# Patient Record
Sex: MALE | Race: WHITE
[De-identification: names, ages, dates, MRNs, and addresses within clinical notes are randomized per-mention and may not be internally consistent; named-entity substitution may affect disease eponyms.]

---

## 2017-08-27 ENCOUNTER — HOSPITAL ENCOUNTER (EMERGENCY)
Dept: HOSPITAL 17 - NEPE | Age: 37
Discharge: HOME | End: 2017-08-27
Payer: COMMERCIAL

## 2017-08-27 VITALS — WEIGHT: 174.17 LBS | BODY MASS INDEX: 21.21 KG/M2 | HEIGHT: 76 IN

## 2017-08-27 VITALS
RESPIRATION RATE: 16 BRPM | HEART RATE: 67 BPM | SYSTOLIC BLOOD PRESSURE: 120 MMHG | TEMPERATURE: 98.1 F | DIASTOLIC BLOOD PRESSURE: 78 MMHG | OXYGEN SATURATION: 100 %

## 2017-08-27 VITALS — RESPIRATION RATE: 17 BRPM

## 2017-08-27 VITALS
OXYGEN SATURATION: 98 % | SYSTOLIC BLOOD PRESSURE: 126 MMHG | TEMPERATURE: 98.4 F | DIASTOLIC BLOOD PRESSURE: 83 MMHG | RESPIRATION RATE: 16 BRPM | HEART RATE: 74 BPM

## 2017-08-27 VITALS — DIASTOLIC BLOOD PRESSURE: 78 MMHG | TEMPERATURE: 97.8 F | SYSTOLIC BLOOD PRESSURE: 118 MMHG

## 2017-08-27 DIAGNOSIS — J20.9: Primary | ICD-10-CM

## 2017-08-27 DIAGNOSIS — F17.290: ICD-10-CM

## 2017-08-27 DIAGNOSIS — R07.89: ICD-10-CM

## 2017-08-27 LAB
ALP SERPL-CCNC: 170 U/L (ref 45–117)
ALT SERPL-CCNC: 123 U/L (ref 12–78)
ANION GAP SERPL CALC-SCNC: 7 MEQ/L (ref 5–15)
AST SERPL-CCNC: 129 U/L (ref 15–37)
BASOPHILS # BLD AUTO: 0 TH/MM3 (ref 0–0.2)
BASOPHILS NFR BLD: 0.9 % (ref 0–2)
BILIRUB SERPL-MCNC: 0.5 MG/DL (ref 0.2–1)
BUN SERPL-MCNC: 13 MG/DL (ref 7–18)
CHLORIDE SERPL-SCNC: 103 MEQ/L (ref 98–107)
EOSINOPHIL # BLD: 0.1 TH/MM3 (ref 0–0.4)
EOSINOPHIL NFR BLD: 3.9 % (ref 0–4)
ERYTHROCYTE [DISTWIDTH] IN BLOOD BY AUTOMATED COUNT: 14.3 % (ref 11.6–17.2)
GFR SERPLBLD BASED ON 1.73 SQ M-ARVRAT: 92 ML/MIN (ref 89–?)
HCO3 BLD-SCNC: 26.8 MEQ/L (ref 21–32)
HCT VFR BLD CALC: 36.6 % (ref 39–51)
HEMO FLAGS: (no result)
LYMPHOCYTES # BLD AUTO: 1.4 TH/MM3 (ref 1–4.8)
LYMPHOCYTES NFR BLD AUTO: 44.7 % (ref 9–44)
MCH RBC QN AUTO: 30.4 PG (ref 27–34)
MCHC RBC AUTO-ENTMCNC: 32.7 % (ref 32–36)
MCV RBC AUTO: 93 FL (ref 80–100)
MONOCYTES NFR BLD: 7.4 % (ref 0–8)
NEUTROPHILS # BLD AUTO: 1.3 TH/MM3 (ref 1.8–7.7)
NEUTROPHILS NFR BLD AUTO: 43.1 % (ref 16–70)
PLATELET # BLD: 100 TH/MM3 (ref 150–450)
POTASSIUM SERPL-SCNC: 4.1 MEQ/L (ref 3.5–5.1)
RBC # BLD AUTO: 3.94 MIL/MM3 (ref 4.5–5.9)
SODIUM SERPL-SCNC: 137 MEQ/L (ref 136–145)
WBC # BLD AUTO: 3.1 TH/MM3 (ref 4–11)

## 2017-08-27 PROCEDURE — 96361 HYDRATE IV INFUSION ADD-ON: CPT

## 2017-08-27 PROCEDURE — 85025 COMPLETE CBC W/AUTO DIFF WBC: CPT

## 2017-08-27 PROCEDURE — 96374 THER/PROPH/DIAG INJ IV PUSH: CPT

## 2017-08-27 PROCEDURE — 84484 ASSAY OF TROPONIN QUANT: CPT

## 2017-08-27 PROCEDURE — 87040 BLOOD CULTURE FOR BACTERIA: CPT

## 2017-08-27 PROCEDURE — 93005 ELECTROCARDIOGRAM TRACING: CPT

## 2017-08-27 PROCEDURE — 83605 ASSAY OF LACTIC ACID: CPT

## 2017-08-27 PROCEDURE — 86403 PARTICLE AGGLUT ANTBDY SCRN: CPT

## 2017-08-27 PROCEDURE — 71010: CPT

## 2017-08-27 PROCEDURE — 87205 SMEAR GRAM STAIN: CPT

## 2017-08-27 PROCEDURE — 99285 EMERGENCY DEPT VISIT HI MDM: CPT

## 2017-08-27 PROCEDURE — 80053 COMPREHEN METABOLIC PANEL: CPT

## 2017-08-27 NOTE — PD
HPI


Chief Complaint:  Cold / Flu Symptoms


Time Seen by Provider:  06:54


Travel History


International Travel<30 days:  No


Contact w/Intl Traveler<30days:  No


Traveled to known affect area:  No





History of Present Illness


HPI


The patient is a 36-year-old  male who presents to the emergency 

department for 3 days of right sided chest pain and cough.  The patient states 

he is dull, constant, right sided chest pain that is worse with inspiration, 

coughing, and movement of the right upper extremity.  He also complains of 

initially nonproductive cough, now producing brown sputum.  He denies any 

shortness of breath, left-sided chest pain, fever, chills, or sweats.  He does 

have a history of tobacco use.  He denies any history of CAD, hypertension, 

hyperlipidemia, or diabetes.  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 

previous osteomyelitis and endocarditis, last used drugs 8 months ago, prior to 

treatment for endocarditis.  He denies any current IVDA.  He denies any nausea, 

vomiting, diarrhea, or abdominal pain.  He denies any right sided chest trauma.





PFSH


Past Medical History


Cancer:  No


Cardiovascular Problems:  No


Diminished Hearing:  No


Endocrine:  No


Gastrointestinal Disorders:  No (ELENA- VENTED/SEDATED)


Genitourinary:  No


Headaches:  Yes


Hepatitis:  Yes (hep c)


Immune Disorder:  No


Implanted Vascular Access Dvce:  No


Musculoskeletal:  Yes


Neurologic:  Yes


Psychiatric:  No


Reproductive:  No


Respiratory:  No


Integumentary:  Yes


Immunizations Current:  Yes


Migraines:  Yes


Tetanus Vaccination:  < 5 Years


Influenza Vaccination:  Yes





Past Surgical History


Neurologic Surgery:  Yes


Other Surgery:  Yes





Social History


Alcohol Use:  Yes (1/2 PINT A DAY PREVIOUSLY)


Tobacco Use:  Yes (1   PACK A DAY )


Substance Use:  Yes (history of polysubstance use, IV Dilaudid often,and abuse 

since high school, quit 8 months ago prior to treatment for endocarditis)





Allergies-Medications


(Allergen,Severity, Reaction):  


Coded Allergies:  


     *MDRO Multi-Drug Resistant Organism (Verified  Adverse Reaction, Unknown, 8 /27/17)


 MRSA (sputum & blood) - 6/25/13


 MRSA (wound) - 9/29/08


 **MRSA PCR Screen POSITIVE - 6/28/2016**


Reported Meds & Prescriptions





Reported Meds & Active Scripts


Active


Reported


Methadone (Methadone HCl) 40 Mg Tab 60 Mg PO DAILY








Review of Systems


Except as stated in HPI:  all other systems reviewed are Neg


General / Constitutional:  No: Fever


HENT:  No: Lightheadedness


Cardiovascular:  Positive: Chest Pain or Discomfort


Respiratory:  Positive: Cough, No: Shortness of Breath


Gastrointestinal:  No: Nausea, Vomiting, Abdominal Pain


Musculoskeletal:  No: Myalgias


Skin:  No Rash





Physical Exam


Narrative


GENERAL: Awake, alert, nontoxic-appearing 36-year-old male who appears his 

stated age and is in no acute respiratory distress.


SKIN: Focused skin assessment warm/dry.


HEAD: Atraumatic. Normocephalic. 


EYES: Pupils equal and round. No scleral icterus. No injection or drainage. 


ENT: No nasal bleeding or discharge.  Mucous membranes pink and moist.


NECK: Trachea midline. No JVD. 


CARDIOVASCULAR: Regular rate and rhythm.  Systolic murmur noted.  Palpation of 

right chest wall reproduces symptoms.


RESPIRATORY: No accessory muscle use. Clear to auscultation. Breath sounds 

equal bilaterally. 


GASTROINTESTINAL: Abdomen soft, non-tender, nondistended.  No rebound 

tenderness.


MUSCULOSKELETAL: No obvious deformities. No clubbing.  No cyanosis.  No edema.  

Abduction and rotation of the right upper extremity reproduces chest pain.


NEUROLOGICAL: Awake and alert. No obvious cranial nerve deficits.  Motor 

grossly within normal limits. Normal speech.


PSYCHIATRIC: Appropriate mood and affect; insight and judgment normal.





Data


Data


Last Documented VS





Vital Signs








  Date Time  Temp Pulse Resp B/P (MAP) Pulse Ox O2 Delivery O2 Flow Rate FiO2


 


8/27/17 07:25 98.1 67 16 120/78 (92) 100 Room Air  








Orders





 Orders


Chest, Single Ap (8/27/17 )


Complete Blood Count With Diff (8/27/17 07:18)


Comprehensive Metabolic Panel (8/27/17 07:18)


Troponin I (8/27/17 07:18)


Lactic Acid (8/27/17 07:18)


Blood Culture (8/27/17 07:18)


Ketorolac Inj (Toradol Inj) (8/27/17 07:30)


Sodium Chlorid 0.9% 500 Ml Inj (Ns 500 M (8/27/17 07:30)


Electrocardiogram (8/27/17 06:42)





Labs





Laboratory Tests








Test


  8/27/17


07:18


 


White Blood Count 3.1 TH/MM3 


 


Red Blood Count 3.94 MIL/MM3 


 


Hemoglobin 12.0 GM/DL 


 


Hematocrit 36.6 % 


 


Mean Corpuscular Volume 93.0 FL 


 


Mean Corpuscular Hemoglobin 30.4 PG 


 


Mean Corpuscular Hemoglobin


Concent 32.7 % 


 


 


Red Cell Distribution Width 14.3 % 


 


Platelet Count 100 TH/MM3 


 


Mean Platelet Volume 9.6 FL 


 


Neutrophils (%) (Auto) 43.1 % 


 


Lymphocytes (%) (Auto) 44.7 % 


 


Monocytes (%) (Auto) 7.4 % 


 


Eosinophils (%) (Auto) 3.9 % 


 


Basophils (%) (Auto) 0.9 % 


 


Neutrophils # (Auto) 1.3 TH/MM3 


 


Lymphocytes # (Auto) 1.4 TH/MM3 


 


Monocytes # (Auto) 0.2 TH/MM3 


 


Eosinophils # (Auto) 0.1 TH/MM3 


 


Basophils # (Auto) 0.0 TH/MM3 


 


CBC Comment DIFF FINAL 


 


Differential Comment  


 


Blood Urea Nitrogen 13 MG/DL 


 


Creatinine 0.93 MG/DL 


 


Random Glucose 123 MG/DL 


 


Total Protein 6.6 GM/DL 


 


Albumin 2.8 GM/DL 


 


Calcium Level 8.2 MG/DL 


 


Alkaline Phosphatase 170 U/L 


 


Aspartate Amino Transf


(AST/SGOT) 129 U/L 


 


 


Alanine Aminotransferase


(ALT/SGPT) 123 U/L 


 


 


Total Bilirubin 0.5 MG/DL 


 


Sodium Level 137 MEQ/L 


 


Potassium Level 4.1 MEQ/L 


 


Chloride Level 103 MEQ/L 


 


Carbon Dioxide Level 26.8 MEQ/L 


 


Anion Gap 7 MEQ/L 


 


Estimat Glomerular Filtration


Rate 92 ML/MIN 


 


 


Lactic Acid Level 1.4 mmol/L 


 


Troponin I


  LESS THAN 0.02


NG/ML











MDM


Medical Decision Making


Medical Screen Exam Complete:  Yes


Emergency Medical Condition:  Yes


Medical Record Reviewed:  Yes


Interpretation(s)


EKG reveals normal sinus rhythm with a rate of 66.  Inverted T-wave in lead 3.








Last Impressions








Chest X-Ray 8/27/17 0000 Signed





Impressions: 





 Service Date/Time:  Sunday, August 27, 2017 07:16 - CONCLUSION: Normal 





 examination.       Roopa Ng MD 








Laboratory Tests








Test


  8/27/17


07:18


 


White Blood Count 3.1 TH/MM3 


 


Red Blood Count 3.94 MIL/MM3 


 


Hemoglobin 12.0 GM/DL 


 


Hematocrit 36.6 % 


 


Mean Corpuscular Volume 93.0 FL 


 


Mean Corpuscular Hemoglobin 30.4 PG 


 


Mean Corpuscular Hemoglobin


Concent 32.7 % 


 


 


Red Cell Distribution Width 14.3 % 


 


Platelet Count 100 TH/MM3 


 


Mean Platelet Volume 9.6 FL 


 


Neutrophils (%) (Auto) 43.1 % 


 


Lymphocytes (%) (Auto) 44.7 % 


 


Monocytes (%) (Auto) 7.4 % 


 


Eosinophils (%) (Auto) 3.9 % 


 


Basophils (%) (Auto) 0.9 % 


 


Neutrophils # (Auto) 1.3 TH/MM3 


 


Lymphocytes # (Auto) 1.4 TH/MM3 


 


Monocytes # (Auto) 0.2 TH/MM3 


 


Eosinophils # (Auto) 0.1 TH/MM3 


 


Basophils # (Auto) 0.0 TH/MM3 


 


CBC Comment DIFF FINAL 


 


Differential Comment  


 


Blood Urea Nitrogen 13 MG/DL 


 


Creatinine 0.93 MG/DL 


 


Random Glucose 123 MG/DL 


 


Total Protein 6.6 GM/DL 


 


Albumin 2.8 GM/DL 


 


Calcium Level 8.2 MG/DL 


 


Alkaline Phosphatase 170 U/L 


 


Aspartate Amino Transf


(AST/SGOT) 129 U/L 


 


 


Alanine Aminotransferase


(ALT/SGPT) 123 U/L 


 


 


Total Bilirubin 0.5 MG/DL 


 


Sodium Level 137 MEQ/L 


 


Potassium Level 4.1 MEQ/L 


 


Chloride Level 103 MEQ/L 


 


Carbon Dioxide Level 26.8 MEQ/L 


 


Anion Gap 7 MEQ/L 


 


Estimat Glomerular Filtration


Rate 92 ML/MIN 


 


 


Lactic Acid Level 1.4 mmol/L 


 


Troponin I


  LESS THAN 0.02


NG/ML








Differential Diagnosis


Differential diagnosis includes bronchitis, pneumonia, pleurisy, acute coronary 

syndrome, endocarditis, pleural effusion, empyema.


Narrative Course


IV was established, labs are drawn and sent, and the patient was placed on 

cardiac telemetry monitoring and continuous pulse oximetry monitoring.  EKG was 

ordered and interpreted.  Chest x-ray was obtained.  Chest x-ray was negative.  

Blood cultures were sent to lab, as patient does have a history of endocarditis 

and previous IVDA, however, patient is currently afebrile.  The patient's white 

count is slightly low at 3.1.  LFTs are elevated in the 120s, patient does have 

a history of significantly elevated LFTs and is tested positive for hepatitis B 

and hepatitis C in the past.  The patient is currently afebrile, we will follow 

blood cultures, there are positive he left his cell phone number with 

registration so we have a contact number in case she is positive.  The patient 

will be treated for bronchitis and pleurisy.  He is advised to return if 

symptoms worsen or progress.





Diagnosis





 Primary Impression:  


 Bronchitis


 Additional Impression:  


 Atypical chest pain


Patient Instructions:  General Instructions





***Additional Instructions:  


Medications as directed.  Follow-up with your primary physician.  Return if 

symptoms worsen or progress.


***Med/Other Pt SpecificInfo:  Prescription(s) given


Scripts


Albuterol 18 GM Inh (Ventolin Hfa 18 GM Inh) 90 Mcg/Act Aer


2 PUFF INH Q4H Y for SHORTNESS OF BREATH, #1 INHALER 0 Refills


   Prov: Harvey Cole MD         8/27/17 


Azithromycin (Zithromax Z-Tian) 250 Mg Dspk


250 MG PO AS DIRECTED for Infection, #1 DSPK 0 Refills


   500 MG (2 tabs) day 1, then 1 tab days 2-5.


   Prov: Harvey Cole MD         8/27/17


Disposition:  01 DISCHARGE HOME


Condition:  Stable











Harvey Cole MD Aug 27, 2017 07:04

## 2017-08-27 NOTE — EKG
Date Performed: 08/27/2017       Time Performed: 06:42:13

 

PTAGE:      36 years

 

EKG:      Sinus rhythm 

 

 NORMAL ECG

 

PREVIOUS TRACING       : 11/16/2016 12.28 Compared to prior tracing no significant change

 

DOCTOR:   Tanner Veloz  Interpretating Date/Time  08/27/2017 11:46:39

## 2017-08-27 NOTE — RADRPT
EXAM DATE/TIME:  08/27/2017 07:16 

 

[facility] COMPARISON:     

CHEST SINGLE AP, October 02, 2016, 13:25.

 

                     

INDICATIONS :     

Right sided chest pain and cough.

                     

 

MEDICAL HISTORY :     

None.          

 

SURGICAL HISTORY :     

None.   

 

ENCOUNTER:     

Initial                                        

 

ACUITY:     

1 day      

 

PAIN SCORE:     

5/10

 

LOCATION:     

Right chest 

 

FINDINGS:     

A single view of the chest demonstrates the lungs to be symmetrically aerated without evidence of mas
s, infiltrate or effusion.  The cardiomediastinal contours are unremarkable.  Osseous structures are 
intact.

 

CONCLUSION:     Normal examination.  

 

 

 

 Roopa Ng MD on August 27, 2017 at 7:16           

Board Certified Radiologist.

 This report was verified electronically.

## 2017-09-03 ENCOUNTER — HOSPITAL ENCOUNTER (INPATIENT)
Dept: HOSPITAL 17 - NEPE | Age: 37
LOS: 4 days | Discharge: HOME | DRG: 289 | End: 2017-09-07
Attending: FAMILY MEDICINE | Admitting: FAMILY MEDICINE
Payer: MEDICAID

## 2017-09-03 VITALS
DIASTOLIC BLOOD PRESSURE: 79 MMHG | HEART RATE: 50 BPM | RESPIRATION RATE: 17 BRPM | OXYGEN SATURATION: 100 % | TEMPERATURE: 97.5 F | SYSTOLIC BLOOD PRESSURE: 114 MMHG

## 2017-09-03 VITALS — WEIGHT: 176.81 LBS | BODY MASS INDEX: 21.98 KG/M2 | HEIGHT: 75 IN

## 2017-09-03 VITALS
HEART RATE: 58 BPM | TEMPERATURE: 98.2 F | DIASTOLIC BLOOD PRESSURE: 72 MMHG | RESPIRATION RATE: 16 BRPM | OXYGEN SATURATION: 98 % | SYSTOLIC BLOOD PRESSURE: 120 MMHG

## 2017-09-03 VITALS
RESPIRATION RATE: 16 BRPM | DIASTOLIC BLOOD PRESSURE: 86 MMHG | OXYGEN SATURATION: 98 % | SYSTOLIC BLOOD PRESSURE: 130 MMHG | HEART RATE: 83 BPM | TEMPERATURE: 98.4 F

## 2017-09-03 VITALS
OXYGEN SATURATION: 98 % | HEART RATE: 60 BPM | SYSTOLIC BLOOD PRESSURE: 125 MMHG | RESPIRATION RATE: 18 BRPM | DIASTOLIC BLOOD PRESSURE: 79 MMHG

## 2017-09-03 VITALS
SYSTOLIC BLOOD PRESSURE: 116 MMHG | HEART RATE: 56 BPM | TEMPERATURE: 98.5 F | RESPIRATION RATE: 18 BRPM | DIASTOLIC BLOOD PRESSURE: 88 MMHG

## 2017-09-03 DIAGNOSIS — M46.20: ICD-10-CM

## 2017-09-03 DIAGNOSIS — F11.20: ICD-10-CM

## 2017-09-03 DIAGNOSIS — F15.10: ICD-10-CM

## 2017-09-03 DIAGNOSIS — D61.818: ICD-10-CM

## 2017-09-03 DIAGNOSIS — F14.10: ICD-10-CM

## 2017-09-03 DIAGNOSIS — F17.210: ICD-10-CM

## 2017-09-03 DIAGNOSIS — B16.9: ICD-10-CM

## 2017-09-03 DIAGNOSIS — I33.9: Primary | ICD-10-CM

## 2017-09-03 LAB
ALP SERPL-CCNC: 181 U/L (ref 45–117)
ALT SERPL-CCNC: 162 U/L (ref 12–78)
ANION GAP SERPL CALC-SCNC: 9 MEQ/L (ref 5–15)
APTT BLD: 29.2 SEC (ref 24.3–30.1)
AST SERPL-CCNC: 173 U/L (ref 15–37)
BASOPHILS # BLD AUTO: 0 TH/MM3 (ref 0–0.2)
BASOPHILS NFR BLD: 1 % (ref 0–2)
BILIRUB SERPL-MCNC: 0.7 MG/DL (ref 0.2–1)
BUN SERPL-MCNC: 12 MG/DL (ref 7–18)
CHLORIDE SERPL-SCNC: 103 MEQ/L (ref 98–107)
CK MB SERPL-MCNC: 4.2 NG/ML (ref 0.5–3.6)
CK SERPL-CCNC: 247 U/L (ref 39–308)
COLOR UR: YELLOW
COMMENT (UR): (no result)
CULTURE IF INDICATED: (no result)
EOSINOPHIL # BLD: 0.1 TH/MM3 (ref 0–0.4)
EOSINOPHIL NFR BLD: 2.8 % (ref 0–4)
ERYTHROCYTE [DISTWIDTH] IN BLOOD BY AUTOMATED COUNT: 13.8 % (ref 11.6–17.2)
GFR SERPLBLD BASED ON 1.73 SQ M-ARVRAT: 88 ML/MIN (ref 89–?)
GLUCOSE UR STRIP-MCNC: (no result) MG/DL
HCO3 BLD-SCNC: 25.7 MEQ/L (ref 21–32)
HCT VFR BLD CALC: 36.9 % (ref 39–51)
HEMO FLAGS: (no result)
HGB UR QL STRIP: (no result)
INR PPP: 1 RATIO
KETONES UR STRIP-MCNC: (no result) MG/DL
LYMPHOCYTES # BLD AUTO: 1.4 TH/MM3 (ref 1–4.8)
LYMPHOCYTES NFR BLD AUTO: 38.7 % (ref 9–44)
MAGNESIUM SERPL-MCNC: 2 MG/DL (ref 1.5–2.5)
MCH RBC QN AUTO: 30.2 PG (ref 27–34)
MCHC RBC AUTO-ENTMCNC: 32.8 % (ref 32–36)
MCV RBC AUTO: 92 FL (ref 80–100)
MONOCYTES NFR BLD: 8.1 % (ref 0–8)
MUCOUS THREADS #/AREA URNS LPF: (no result) /LPF
NEUTROPHILS # BLD AUTO: 1.7 TH/MM3 (ref 1.8–7.7)
NEUTROPHILS NFR BLD AUTO: 49.4 % (ref 16–70)
NITRITE UR QL STRIP: (no result)
PLAT MORPH BLD: NORMAL
PLATELET # BLD: 97 TH/MM3 (ref 150–450)
PLATELET BLD QL SMEAR: (no result)
POTASSIUM SERPL-SCNC: 4 MEQ/L (ref 3.5–5.1)
PROTHROMBIN TIME: 11.5 SEC (ref 9.8–11.6)
RBC # BLD AUTO: 4.01 MIL/MM3 (ref 4.5–5.9)
RBC MORPH BLD: NORMAL
SCAN/DIFF: (no result)
SODIUM SERPL-SCNC: 138 MEQ/L (ref 136–145)
SP GR UR STRIP: 1.03 (ref 1–1.03)
WBC # BLD AUTO: 3.5 TH/MM3 (ref 4–11)

## 2017-09-03 PROCEDURE — 82550 ASSAY OF CK (CPK): CPT

## 2017-09-03 PROCEDURE — 85730 THROMBOPLASTIN TIME PARTIAL: CPT

## 2017-09-03 PROCEDURE — 36569 INSJ PICC 5 YR+ W/O IMAGING: CPT

## 2017-09-03 PROCEDURE — 84100 ASSAY OF PHOSPHORUS: CPT

## 2017-09-03 PROCEDURE — 85025 COMPLETE CBC W/AUTO DIFF WBC: CPT

## 2017-09-03 PROCEDURE — 87205 SMEAR GRAM STAIN: CPT

## 2017-09-03 PROCEDURE — 87186 SC STD MICRODIL/AGAR DIL: CPT

## 2017-09-03 PROCEDURE — 80202 ASSAY OF VANCOMYCIN: CPT

## 2017-09-03 PROCEDURE — 83605 ASSAY OF LACTIC ACID: CPT

## 2017-09-03 PROCEDURE — 93325 DOPPLER ECHO COLOR FLOW MAPG: CPT

## 2017-09-03 PROCEDURE — 96365 THER/PROPH/DIAG IV INF INIT: CPT

## 2017-09-03 PROCEDURE — 80074 ACUTE HEPATITIS PANEL: CPT

## 2017-09-03 PROCEDURE — 83735 ASSAY OF MAGNESIUM: CPT

## 2017-09-03 PROCEDURE — 76937 US GUIDE VASCULAR ACCESS: CPT

## 2017-09-03 PROCEDURE — 87522 HEPATITIS C REVRS TRNSCRPJ: CPT

## 2017-09-03 PROCEDURE — 85007 BL SMEAR W/DIFF WBC COUNT: CPT

## 2017-09-03 PROCEDURE — 84484 ASSAY OF TROPONIN QUANT: CPT

## 2017-09-03 PROCEDURE — 71010: CPT

## 2017-09-03 PROCEDURE — 93306 TTE W/DOPPLER COMPLETE: CPT

## 2017-09-03 PROCEDURE — 80053 COMPREHEN METABOLIC PANEL: CPT

## 2017-09-03 PROCEDURE — 86403 PARTICLE AGGLUT ANTBDY SCRN: CPT

## 2017-09-03 PROCEDURE — 93005 ELECTROCARDIOGRAM TRACING: CPT

## 2017-09-03 PROCEDURE — 87517 HEPATITIS B DNA QUANT: CPT

## 2017-09-03 PROCEDURE — 85027 COMPLETE CBC AUTOMATED: CPT

## 2017-09-03 PROCEDURE — 82552 ASSAY OF CPK IN BLOOD: CPT

## 2017-09-03 PROCEDURE — 93312 ECHO TRANSESOPHAGEAL: CPT

## 2017-09-03 PROCEDURE — 80307 DRUG TEST PRSMV CHEM ANLYZR: CPT

## 2017-09-03 PROCEDURE — 93320 DOPPLER ECHO COMPLETE: CPT

## 2017-09-03 PROCEDURE — 87077 CULTURE AEROBIC IDENTIFY: CPT

## 2017-09-03 PROCEDURE — 87040 BLOOD CULTURE FOR BACTERIA: CPT

## 2017-09-03 PROCEDURE — 85610 PROTHROMBIN TIME: CPT

## 2017-09-03 PROCEDURE — 81001 URINALYSIS AUTO W/SCOPE: CPT

## 2017-09-03 PROCEDURE — 76705 ECHO EXAM OF ABDOMEN: CPT

## 2017-09-03 RX ADMIN — ENOXAPARIN SODIUM SCH MG: 40 INJECTION SUBCUTANEOUS at 10:33

## 2017-09-03 RX ADMIN — NICOTINE SCH PATCH: 7 PATCH, EXTENDED RELEASE TOPICAL at 10:32

## 2017-09-03 RX ADMIN — STANDARDIZED SENNA CONCENTRATE AND DOCUSATE SODIUM SCH TAB: 8.6; 5 TABLET, FILM COATED ORAL at 09:49

## 2017-09-03 RX ADMIN — VANCOMYCIN HYDROCHLORIDE SCH MLS/HR: 1 INJECTION, SOLUTION INTRAVENOUS at 17:18

## 2017-09-03 RX ADMIN — Medication SCH ML: at 21:00

## 2017-09-03 RX ADMIN — PHENYTOIN SODIUM SCH MLS/HR: 50 INJECTION INTRAMUSCULAR; INTRAVENOUS at 17:18

## 2017-09-03 RX ADMIN — PHENYTOIN SODIUM SCH MLS/HR: 50 INJECTION INTRAMUSCULAR; INTRAVENOUS at 09:48

## 2017-09-03 RX ADMIN — VANCOMYCIN HYDROCHLORIDE SCH MLS/HR: 1 INJECTION, SOLUTION INTRAVENOUS at 23:38

## 2017-09-03 RX ADMIN — STANDARDIZED SENNA CONCENTRATE AND DOCUSATE SODIUM SCH TAB: 8.6; 5 TABLET, FILM COATED ORAL at 21:00

## 2017-09-03 RX ADMIN — METHADONE HYDROCHLORIDE SCH MG: 10 TABLET ORAL at 09:49

## 2017-09-03 RX ADMIN — Medication SCH ML: at 09:48

## 2017-09-03 NOTE — RADRPT
EXAM DATE/TIME:  09/03/2017 07:32 

 

[facility] COMPARISON:     

CHEST SINGLE AP, August 27, 2017, 7:16.

 

                     

INDICATIONS :     

Right sided chest pain.

                     

 

MEDICAL HISTORY :     

None.          

 

SURGICAL HISTORY :     

None.   

 

ENCOUNTER:     

[facility]                                        

 

ACUITY:     

2 weeks      

 

PAIN SCORE:     

5/10

 

LOCATION:     

Right chest 

 

FINDINGS:     

A single view of the chest demonstrates the lungs to be symmetrically aerated without evidence of mas
s, infiltrate or effusion.  The cardiomediastinal contours are unremarkable.  Osseous structures are 
intact.

 

CONCLUSION:     No acute disease.  

 

 

 

 Jesús Singer MD on September 03, 2017 at 7:38           

Board Certified Radiologist.

 This report was verified electronically.

## 2017-09-03 NOTE — PD
HPI


Chief Complaint:  Abnormal Results


Time Seen by Provider:  07:02


Travel History


International Travel<30 days:  No


Contact w/Intl Traveler<30days:  No


Traveled to known affect area:  No





History of Present Illness


HPI


c/o gen weakness and malaise for last 2 days, was seen here on aug 28, felt 

better but now feels more general weakness and occasional cp with nonproductive 

cough..per patient he has not abused dilaudid ivda as before, he follows with a 

methaddone clinic instead..   denies active fever, /n/v/d/ha/abd pain





PFSH


Past Medical History


Cancer:  No


Cardiovascular Problems:  No


Diminished Hearing:  No


Endocrine:  No


Gastrointestinal Disorders:  No (ELENA- VENTED/SEDATED)


Genitourinary:  No


Headaches:  Yes


Hepatitis:  Yes (hep c)


Immune Disorder:  No


Implanted Vascular Access Dvce:  No


Musculoskeletal:  Yes


Neurologic:  Yes


Psychiatric:  No


Reproductive:  No


Respiratory:  No


Integumentary:  Yes


Immunizations Current:  Yes


Migraines:  Yes


Tetanus Vaccination:  < 5 Years


Influenza Vaccination:  Yes





Past Surgical History


Neurologic Surgery:  Yes ("half skull was crushed")


Other Surgery:  Yes





Social History


Alcohol Use:  Yes (1/2 PINT A DAY PREVIOUSLY)


Tobacco Use:  Yes (1   PACK A DAY )


Substance Use:  Yes (methadone RX)





Allergies-Medications


(Allergen,Severity, Reaction):  


Coded Allergies:  


     *MDRO Multi-Drug Resistant Organism (Verified  Adverse Reaction, Unknown, 8 /27/17)


 MRSA (sputum & blood) - 6/25/13


 MRSA (wound) - 9/29/08


 **MRSA PCR Screen POSITIVE - 6/28/2016**


Reported Meds & Prescriptions





Reported Meds & Active Scripts


Active


Ventolin Hfa 18 GM Inh (Albuterol Sulfate) 90 Mcg/Act Aer 2 Puff INH Q4H PRN


Reported


Methadone (Methadone HCl) 40 Mg Tab 60 Mg PO DAILY








Review of Systems


Except as stated in HPI:  all other systems reviewed are Neg


General / Constitutional:  Positive: Other (malaise)


Cardiovascular:  Positive: Chest Pain or Discomfort


Respiratory:  Positive: Cough





Physical Exam


Narrative


GENERAL: 


SKIN: Warm and dry.


HEAD: Atraumatic. Normocephalic. 


EYES: Pupils equal and round. No scleral icterus. No injection or drainage. 


ENT: No nasal bleeding or discharge.  Mucous membranes pink and moist.


NECK: Trachea midline. No JVD. 


CARDIOVASCULAR: Regular rate and rhythm.  holosystolic murmur rad too left 

axilla c/w mitral valve dz


RESPIRATORY: No accessory muscle use. Clear to auscultation. Breath sounds 

equal bilaterally. 


GASTROINTESTINAL: Abdomen soft, non-tender, nondistended.


MUSCULOSKELETAL: Extremities without clubbing, cyanosis, or edema. No obvious 

deformities. 


NEUROLOGICAL: Awake and alert. No obvious cranial nerve deficits.  Motor 

grossly within normal limits. Five out of 5 muscle strength in the arms and 

legs.  Normal speech.


PSYCHIATRIC: Appropriate mood and affect; insight and judgment normal.





Data


Data


Last Documented VS





Vital Signs








  Date Time  Temp Pulse Resp B/P (MAP) Pulse Ox O2 Delivery O2 Flow Rate FiO2


 


9/3/17 06:26 98.4 83 16 130/86 (101) 98 Room Air  








Orders





 Orders


Electrocardiogram (9/3/17 07:15)


Ckmb (Isoenzyme) Profile (9/3/17 07:15)


Complete Blood Count With Diff (9/3/17 07:15)


Comprehensive Metabolic Panel (9/3/17 07:15)


Prothrombin Time / Inr (Pt) (9/3/17 07:15)


Act Partial Throm Time (Ptt) (9/3/17 07:15)


Troponin I (9/3/17 07:15)


Chest, Single Ap (9/3/17 07:15)


Ecg Monitoring (9/3/17 07:15)


Bilateral Bp Monitoring (9/3/17 07:15)


Iv Access Insert/Monitor (9/3/17 07:15)


Oximetry (9/3/17 07:15)


Oxygen Administration (9/3/17 07:15)


Sodium Chloride 0.9% Flush (Ns Flush) (9/3/17 07:15)


Vancomycin Inj (Vancomycin Inj) (9/3/17 07:30)


Admit Order (Ed Use Only) (9/3/17 08:22)


CKMB (9/3/17 07:50)


CKMB% (9/3/17 07:50)





Labs





Laboratory Tests








Test


  9/3/17


07:50


 


White Blood Count 3.5 TH/MM3 


 


Red Blood Count 4.01 MIL/MM3 


 


Hemoglobin 12.1 GM/DL 


 


Hematocrit 36.9 % 


 


Mean Corpuscular Volume 92.0 FL 


 


Mean Corpuscular Hemoglobin 30.2 PG 


 


Mean Corpuscular Hemoglobin


Concent 32.8 % 


 


 


Red Cell Distribution Width 13.8 % 


 


Platelet Count 97 TH/MM3 


 


Mean Platelet Volume 9.2 FL 


 


Neutrophils (%) (Auto) 49.4 % 


 


Lymphocytes (%) (Auto) 38.7 % 


 


Monocytes (%) (Auto) 8.1 % 


 


Eosinophils (%) (Auto) 2.8 % 


 


Basophils (%) (Auto) 1.0 % 


 


Neutrophils # (Auto) 1.7 TH/MM3 


 


Lymphocytes # (Auto) 1.4 TH/MM3 


 


Monocytes # (Auto) 0.3 TH/MM3 


 


Eosinophils # (Auto) 0.1 TH/MM3 


 


Basophils # (Auto) 0.0 TH/MM3 


 


CBC Comment AUTO DIFF 


 


Differential Comment


  AUTO DIFF


CONFIRMED


 


Platelet Estimate LOW 


 


Platelet Morphology Comment NORMAL 


 


Red Cell Morphology Comment NORMAL 


 


Prothrombin Time 11.5 SEC 


 


Prothromb Time International


Ratio 1.0 RATIO 


 


 


Activated Partial


Thromboplast Time 29.2 SEC 


 


 


Blood Urea Nitrogen 12 MG/DL 


 


Creatinine 0.97 MG/DL 


 


Random Glucose 64 MG/DL 


 


Total Protein 7.1 GM/DL 


 


Albumin 3.2 GM/DL 


 


Calcium Level 8.2 MG/DL 


 


Alkaline Phosphatase 181 U/L 


 


Aspartate Amino Transf


(AST/SGOT) 173 U/L 


 


 


Alanine Aminotransferase


(ALT/SGPT) 162 U/L 


 


 


Total Bilirubin 0.7 MG/DL 


 


Sodium Level 138 MEQ/L 


 


Potassium Level 4.0 MEQ/L 


 


Chloride Level 103 MEQ/L 


 


Carbon Dioxide Level 25.7 MEQ/L 


 


Anion Gap 9 MEQ/L 


 


Estimat Glomerular Filtration


Rate 88 ML/MIN 


 


 


Total Creatine Kinase 247 U/L 


 


Creatine Kinase MB 4.2 NG/ML 


 


Troponin I


  LESS THAN 0.02


NG/ML











MDM


Medical Decision Making


Medical Screen Exam Complete:  Yes


Emergency Medical Condition:  Yes


Medical Record Reviewed:  Yes


Differential Diagnosis


r/o endocarditis v vegetations v pna


Narrative Course


patient had positive blood cultures and due to his previous endocarditis 

history will give iv abx and keep for observation.





Diagnosis





 Primary Impression:  


 Bacteremia





Admitting Information


Admitting Physician Requests:  Observation











Romain Torre MD Sep 3, 2017 07:15

## 2017-09-03 NOTE — EKG
Date Performed: 09/03/2017       Time Performed: 07:48:17

 

PTAGE:      36 years

 

EKG:      SINUS BRADYCARDIA BORDERLINE ECG

 

PREVIOUS TRACING       : 08/27/2017 06.42

 

DOCTOR:   Héctor Spaulding  Interpretating Date/Time  09/03/2017 12:35:14

## 2017-09-03 NOTE — HHI.HP
HPI


Service


Family Medicine


Primary Care Physician


No Primary Care Physician


Admission Diagnosis





POSITIVE CULTURE R/O ENDOCARDITIS


Diagnoses:  


International Travel<30 Days:  No


Contact w/Intl Traveler<30days:  No


Known Affected Area:  No


History of Present Illness


Mr. Noah Curran is a 35-year-old male with a past medical history 

significant for IV drug use with consequential endocarditis diagnosed in 

September 2016, polysubstance abuse, hepatitis B, and vertebral osteomyelitis 

diagnosed in June 2016 that presents to the [facility] after being called to 

come in due to positive blood cultures. He was in the ED on Aug 27, 2016 with a 

chief complaint of right-sided chest pain and cough that was productive of 

brown sputum. He did not have any fever, chills or night sweats at that time. 

Blood cultures were drawn which grew coagulase-negative Staphylococcus in both 

anaerobic vials.





Today, he complains of fatigue and still has some mild chest pain. He denies 

cough, shortness of breath, fever, chills, or night sweats. He does have some 

difficulty with urination such that it is difficult to initiate.





Patient states that he has not used any IV drugs since his discharge from the 

hospital in November 2016. He has been going to the methadone clinic and is 

currently on 50 mg of methadone daily. He continues to smoke and drinks a twist 

of gin daily.


 (Clover Kelly MD R2)





Review of Systems


Constitutional:  COMPLAINS OF: Fatigue, Weight loss, Change in appetite, DENIES

: Fever, Chills, Night Sweats


Eyes:  DENIES: Blurred vision


Ears, nose, mouth, throat:  DENIES: Nasal discharge, Throat pain, Running Nose, 

Sinus Pain


Respiratory:  DENIES: Cough, Shortness of breath


Cardiovascular:  COMPLAINS OF: Chest pain, DENIES: Palpitations


Gastrointestinal:  DENIES: Abdominal pain, Bloody stools, Constipation, Diarrhea

, Nausea, Vomiting


Genitourinary:  COMPLAINS OF: Dysuria (More difficulty with urination)


Musculoskeletal:  DENIES: Joint pain, Muscle aches


Integumentary:  DENIES: Pruritus, Rash


Neurologic:  DENIES: Headache


Psychiatric:  DENIES: Anxiety, Depression (Clover Kelly MD R2)





Past Family Social History


Past Medical History


Mitral valve endocarditis in September 2016


Vertebral osteomyelitis in July 2016


Chronic back pain


Polysubstance IV drug abuse


Tobacco abuse


Past Surgical History


Right tibia fracture repair with plates


Reported Medications





Reported Meds & Active Scripts


Active


Ventolin Hfa 18 GM Inh (Albuterol Sulfate) 90 Mcg/Act Aer 2 Puff INH Q4H PRN


Reported


Methadone (Methadone HCl) 40 Mg Tab 60 Mg PO DAILY


 (BenjamínoClover MD R2)


Allergies:  


Coded Allergies:  


     *MDRO Multi-Drug Resistant Organism (Verified  Adverse Reaction, Unknown, 8 /27/17)


 MRSA (sputum & blood) - 6/25/13


 MRSA (wound) - 9/29/08


 **MRSA PCR Screen POSITIVE - 6/28/2016**


Family History


Father has diabetes and hypertension


Social History


Current daily smoker, recently cut down to half to three-quarter pack per day


Drinks a twist of gin daily


 but 


 (Clover Kelly MD R2)





Physical Exam


Vital Signs





Vital Signs








  Date Time  Temp Pulse Resp B/P (MAP) Pulse Ox O2 Delivery O2 Flow Rate FiO2


 


9/3/17 06:26 98.4 83 16 130/86 (101) 98 Room Air  








Physical Exam


GENERAL: This is a well-developed patient, in no apparent distress.


SKIN: No rashes, ecchymoses or lesions. Cool and dry.


HEAD: Atraumatic. Normocephalic. No temporal or scalp tenderness.


EYES: Pupils equal round and reactive. Extraocular motions intact. No scleral 

icterus. No injection or drainage. 


ENT: Nose without bleeding, purulent drainage or septal hematoma. Throat 

without erythema, tonsillar hypertrophy or exudate. Uvula midline. Airway 

patent.


NECK: Trachea midline. No JVD or lymphadenopathy. Small 0.5 mm nodule in the 

right lateral neck consistent with scar tissue. Supple, nontender, no meningeal 

signs.


CARDIOVASCULAR: Regular rate, regular rhythm, holosystolic murmur best heard in 

the mitral area


RESPIRATORY: Clear to auscultation. Breath sounds equal bilaterally. No wheezes

, rales, or rhonchi.  


GASTROINTESTINAL: Abdomen soft, non-tender, nondistended. No hepato-splenomegaly

, or palpable masses. No guarding.


MUSCULOSKELETAL: Extremities without clubbing, cyanosis, or edema. No joint 

tenderness, effusion, or edema noted. No calf tenderness. Negative Homans sign 

bilaterally.


NEUROLOGICAL: Very sleepy during exam but arousable. Cranial nerves II through 

XII intact.  Motor and sensory grossly within normal limits. Five out of 5 

muscle strength in all muscle groups.  Normal speech.


Laboratory





Laboratory Tests








Test


  9/3/17


07:50


 


White Blood Count 3.5 


 


Red Blood Count 4.01 


 


Hemoglobin 12.1 


 


Hematocrit 36.9 


 


Mean Corpuscular Volume 92.0 


 


Mean Corpuscular Hemoglobin 30.2 


 


Mean Corpuscular Hemoglobin


Concent 32.8 


 


 


Red Cell Distribution Width 13.8 


 


Platelet Count 97 


 


Mean Platelet Volume 9.2 


 


Neutrophils (%) (Auto) 49.4 


 


Lymphocytes (%) (Auto) 38.7 


 


Monocytes (%) (Auto) 8.1 


 


Eosinophils (%) (Auto) 2.8 


 


Basophils (%) (Auto) 1.0 


 


Neutrophils # (Auto) 1.7 


 


Lymphocytes # (Auto) 1.4 


 


Monocytes # (Auto) 0.3 


 


Eosinophils # (Auto) 0.1 


 


Basophils # (Auto) 0.0 


 


CBC Comment AUTO DIFF 


 


Prothrombin Time 11.5 


 


Prothromb Time International


Ratio 1.0 


 


 


Activated Partial


Thromboplast Time 29.2 


 


 


Blood Urea Nitrogen 12 


 


Creatinine 0.97 


 


Random Glucose 64 


 


Total Protein 7.1 


 


Albumin 3.2 


 


Calcium Level 8.2 


 


Alkaline Phosphatase 181 


 


Aspartate Amino Transf


(AST/SGOT) 173 


 


 


Alanine Aminotransferase


(ALT/SGPT) 162 


 


 


Total Bilirubin 0.7 


 


Sodium Level 138 


 


Potassium Level 4.0 


 


Chloride Level 103 


 


Carbon Dioxide Level 25.7 


 


Anion Gap 9 


 


Estimat Glomerular Filtration


Rate 88 


 


 


Total Creatine Kinase 247 


 


Troponin I LESS THAN 0.02 








 (Clover Kelly MD R2)


Result Diagram:  


9/3/17 0750                                                                    

            9/3/17 0750





Imaging





Last Impressions








Chest X-Ray 9/3/17 0715 Signed





Impressions: 





 Service Date/Time:  Abrahan, September 3, 2017 07:32 - CONCLUSION: No acute 





 disease.       Jesús Singer MD 








 (Clover Kelly MD R2)





Shereei VTE Risk Assessment


Capdottie VTE Risk Assessment:  No/Low Risk (score <= 1)


 (Clover Kelly MD R2)





Assessment and Plan


Assessment and Plan


35-year-old  male with a past medical history significant for IV drug 

use, polysubstance abuse, hepatitis B, and endocarditis presents with chief 

complaint of fatigue and anaerobic blood cultures positive for coagulase-

negative Staphylococcus aureus. Patient does have a 3/6 holosystolic mitral 

valve murmur which was present during last admission in September 2016. He 

would need to be reassessed for endocarditis due to persistent murmur and 

positive blood cultures.


Code Status


Full code


Discussed Condition With


Discussed with Dr. Turcios


 (Eko,Clover FREED MD R2)


Attending Attestation


Patient seen and examined.  Case reviewed and discussed with the resident team.

  Agree with plan of care as discussed with me and documented in the resident 

note.


pt seen in his room on day of admission. he was so proud of being iv drug free 

that he showed off his arms to demonstrate there were no track marks. He has 

had some subtle symptoms which could be suggestive of endocarditis with a more 

indolent organism


 (Sonia Turcios MD)


Problem List:  


(1) Bacteremia


ICD Codes:  R78.81 - Bacteremia


Status:  Acute


Plan:  -Possible source is mitral valve endocarditis. Blood cultures from 8/27/ 17 growing coagulase-negative staph in anaerobic vials 2


-Patient reports feeling fatigued although no fever or chills


-CBC shows a WBC of 3.5 with 49.4 % neutrophils; lactate of 0.8


-CXR does not indicate an acute pulmonary process 


-UA showed negative leukocyte esterase, negative occult blood, and 1 RBC - 

culture not indicated 


-EKG ordered in the ED showed normal sinus rhythm





Plan


-2-D echo in the a.m.


-Cardiology consulted for possible SCOTTY


-Repeat blood cultures


-Vancomycin 1 g IV every 8 with pharmacy consult (started 9/3/17)





(2) Endocarditis, suspected


ICD Codes:  Z03.89 - Encounter for observation for other suspected diseases and 

conditions ruled out


Plan:  -See plan above for bacteremia





(3) Hepatitis B


ICD Codes:  B19.10 - Unspecified viral hepatitis B without hepatic coma


Status:  Acute


Plan:  -Patient was diagnosed with hepatitis B infection at last admission in 

September 2016


-Will recheck a quantitative hepatitis B and C viral loads





(4) Pancytopenia


ICD Codes:  D61.818 - Other pancytopenia


Plan:  -H&H 12.1/36.9 respectively


-WBC low at 3.5


-Platelets 97


-Possibly from poor nutrition or bacteremia


-We'll continue to monitor with daily CBCs





(5) Methadone maintenance therapy patient


ICD Codes:  F11.20 - Opioid dependence, uncomplicated


Status:  Acute


Plan:  -History of polysubstance and IV drug use


-UDS positive for cocaine and amphetamines, negative for opiates


-Continue methadone 60 mg by mouth daily





(6) Tobacco abuse


ICD Codes:  Z72.0 - Tobacco use


Plan:  -Nicotine patch 7.5 mg





(7) FEN/DVT PPX/GI PPX/Nursing Orders 


Plan:  Fluids: NS @ 125 mls/hr IV


Electrolytes: Will monitor and replace as needed, a.m. labs


Nutrition: Regular adult diet


DVT Prophylaxis: Lovenox 40mg daily


GI Prophylaxis: None required currently


Constipation prophylaxis: Nadya-colase 1 tab by mouth twice a day


Tylenol 650 mg by mouth every 4 hours when necessary pain 1-10 a temperature 

greater than 100.4F


Zofran 4 mg IV push every 6 hours when necessary nausea vomiting


Restoril 15 mg by mouth at bedtime when necessary insomnia





-Vitals Q4h


-Monitor I's and O's


-Fall precautions


-Activity OOB with assistance


-PT to assist with ambulation


-Case management consult to assist with discharge disposition





Disposition: Pending blood cultures, echo, clinical improvement


 (Clover Kelly MD R2)





Problem Qualifiers





(1) Hepatitis B:  


Qualified Codes:  B18.1 - Chronic viral hepatitis B without delta-agent








Clover Kelly MD R2 Sep 3, 2017 08:28


Sonia Turcios MD Sep 4, 2017 18:12

## 2017-09-04 VITALS
TEMPERATURE: 97.7 F | RESPIRATION RATE: 20 BRPM | OXYGEN SATURATION: 99 % | HEART RATE: 57 BPM | DIASTOLIC BLOOD PRESSURE: 68 MMHG | SYSTOLIC BLOOD PRESSURE: 110 MMHG

## 2017-09-04 VITALS
OXYGEN SATURATION: 97 % | TEMPERATURE: 97.9 F | HEART RATE: 60 BPM | RESPIRATION RATE: 20 BRPM | DIASTOLIC BLOOD PRESSURE: 75 MMHG | SYSTOLIC BLOOD PRESSURE: 107 MMHG

## 2017-09-04 VITALS
RESPIRATION RATE: 20 BRPM | TEMPERATURE: 98.1 F | SYSTOLIC BLOOD PRESSURE: 119 MMHG | OXYGEN SATURATION: 100 % | DIASTOLIC BLOOD PRESSURE: 69 MMHG | HEART RATE: 50 BPM

## 2017-09-04 VITALS
OXYGEN SATURATION: 98 % | HEART RATE: 54 BPM | RESPIRATION RATE: 17 BRPM | SYSTOLIC BLOOD PRESSURE: 113 MMHG | DIASTOLIC BLOOD PRESSURE: 69 MMHG | TEMPERATURE: 97.6 F

## 2017-09-04 VITALS
RESPIRATION RATE: 18 BRPM | SYSTOLIC BLOOD PRESSURE: 120 MMHG | OXYGEN SATURATION: 98 % | DIASTOLIC BLOOD PRESSURE: 78 MMHG | HEART RATE: 57 BPM | TEMPERATURE: 97.2 F

## 2017-09-04 VITALS
RESPIRATION RATE: 18 BRPM | DIASTOLIC BLOOD PRESSURE: 73 MMHG | TEMPERATURE: 97.9 F | OXYGEN SATURATION: 98 % | SYSTOLIC BLOOD PRESSURE: 109 MMHG | HEART RATE: 63 BPM

## 2017-09-04 VITALS
TEMPERATURE: 97.8 F | DIASTOLIC BLOOD PRESSURE: 81 MMHG | SYSTOLIC BLOOD PRESSURE: 122 MMHG | HEART RATE: 64 BPM | OXYGEN SATURATION: 98 % | RESPIRATION RATE: 19 BRPM

## 2017-09-04 LAB
ALP SERPL-CCNC: 166 U/L (ref 45–117)
ALT SERPL-CCNC: 133 U/L (ref 12–78)
ANION GAP SERPL CALC-SCNC: 5 MEQ/L (ref 5–15)
AST SERPL-CCNC: 142 U/L (ref 15–37)
BILIRUB SERPL-MCNC: 0.5 MG/DL (ref 0.2–1)
BUN SERPL-MCNC: 11 MG/DL (ref 7–18)
CHLORIDE SERPL-SCNC: 108 MEQ/L (ref 98–107)
ERYTHROCYTE [DISTWIDTH] IN BLOOD BY AUTOMATED COUNT: 14 % (ref 11.6–17.2)
GFR SERPLBLD BASED ON 1.73 SQ M-ARVRAT: 101 ML/MIN (ref 89–?)
HCO3 BLD-SCNC: 26.5 MEQ/L (ref 21–32)
HCT VFR BLD CALC: 34 % (ref 39–51)
MCH RBC QN AUTO: 30.5 PG (ref 27–34)
MCHC RBC AUTO-ENTMCNC: 32.9 % (ref 32–36)
MCV RBC AUTO: 92.7 FL (ref 80–100)
PLATELET # BLD: 79 TH/MM3 (ref 150–450)
POTASSIUM SERPL-SCNC: 4.2 MEQ/L (ref 3.5–5.1)
RBC # BLD AUTO: 3.67 MIL/MM3 (ref 4.5–5.9)
REVIEW FLAG: (no result)
SODIUM SERPL-SCNC: 139 MEQ/L (ref 136–145)
WBC # BLD AUTO: 2.4 TH/MM3 (ref 4–11)

## 2017-09-04 RX ADMIN — VANCOMYCIN HYDROCHLORIDE SCH MLS/HR: 1 INJECTION, SOLUTION INTRAVENOUS at 07:46

## 2017-09-04 RX ADMIN — STANDARDIZED SENNA CONCENTRATE AND DOCUSATE SODIUM SCH TAB: 8.6; 5 TABLET, FILM COATED ORAL at 07:45

## 2017-09-04 RX ADMIN — PHENYTOIN SODIUM SCH MLS/HR: 50 INJECTION INTRAMUSCULAR; INTRAVENOUS at 23:43

## 2017-09-04 RX ADMIN — METHADONE HYDROCHLORIDE SCH MG: 10 TABLET ORAL at 07:44

## 2017-09-04 RX ADMIN — VANCOMYCIN HYDROCHLORIDE SCH MLS/HR: 1 INJECTION, SOLUTION INTRAVENOUS at 16:21

## 2017-09-04 RX ADMIN — Medication SCH ML: at 20:58

## 2017-09-04 RX ADMIN — Medication SCH ML: at 07:47

## 2017-09-04 RX ADMIN — STANDARDIZED SENNA CONCENTRATE AND DOCUSATE SODIUM SCH TAB: 8.6; 5 TABLET, FILM COATED ORAL at 20:56

## 2017-09-04 RX ADMIN — VANCOMYCIN HYDROCHLORIDE SCH MLS/HR: 1 INJECTION, SOLUTION INTRAVENOUS at 23:42

## 2017-09-04 RX ADMIN — ENOXAPARIN SODIUM SCH MG: 40 INJECTION SUBCUTANEOUS at 07:45

## 2017-09-04 RX ADMIN — PHENYTOIN SODIUM SCH MLS/HR: 50 INJECTION INTRAMUSCULAR; INTRAVENOUS at 21:00

## 2017-09-04 RX ADMIN — NICOTINE SCH PATCH: 7 PATCH, EXTENDED RELEASE TOPICAL at 07:45

## 2017-09-04 NOTE — MB
cc:

HIRO JOSHI M.D., HANSCY M.D.

****

 

 

DATE OF CONSULTATION

9/4/2017

 

Electrophysiology consult.

 

REASON FOR CONSULTATION

Rule out endocarditis.

 

HISTORY OF THE PRESENT ILLNESS

Mr. Curran is a 36-year-old  gentleman with history of IV drug abuse,

last time he used drugs was before his hospitalization. He has a history of

hepatitis B. He was  admitted in June of 2016. He has osteomyelitis and mitral

valve  vegetation.  Gentleman re-admitted again with shortness of breath and

fever.  Endocarditis suspected. Antibiotics initiated.  I was consulted for

evaluation and management.  The chart was reviewed.  The patient was

evaluated.

 

ALLERGIES

MULTIPLE DRUG RESISTANT ORGANISMS.

 

SOCIAL HISTORY

Gentleman smokes a pack and half of cigarettes a day and uses IV drugs.

 

FAMILY HISTORY

Noncontributory to his current medical condition.

 

MEDICATIONS

Currently the gentleman is on:

1. Vancomycin.

2. He is on albuterol.

3. He is on Lovenox subcu.

4. He is on methadone.

5. He is on Zofran.

6. And Restoril.

 

REVIEW OF SYSTEMS

He refers feeling fine.  No chest pain. No shortness of breath.  No fever.

 

PHYSICAL EXAMINATION

GENERAL: Fully oriented.

VITAL SIGNS: Blood pressure 107/75, pulse 60, respiratory 18.

LUNGS: Ventilated.

CARDIOVASCULAR:  S1-S2 regular.  No gallop.  There is a discrete systolic

ejection murmur.

ABDOMEN: Soft.  No mass.  No bruit.

EXTREMITIES: No edema.

 

Electrocardiogram indicated sinus rhythm.  No acute ST and T-wave changes.

 

LABORATORY DATA

Hemoglobin 11.2, white blood cell 2.4.

 

Potassium 4.2, creatinine is 0.86.

 

INR 1.0.

 

His blood culture is negative in the past 24 hours.

 

ASSESSMENT AND RECOMMENDATIONS

Mr. Curran is currently stable.  He is playing with his phone.  There is no

fever.  No shortness of breath.  I discussed the case with Dr. Destiny Miller, infectious disease. At that point apparently there is a contaminate

in the blood culture.  No need for a transesophageal echo.  Case discussed with

the patient.  I will be available in a p.r.n. basis.  If the treatment plan

changes please call the on-call cardiologist.

 

 

 

                              _________________________________

                              MD EPHRAIM Mendoza/SHIRA

D:  9/4/2017/4:56 PM

T:  9/4/2017/7:07 PM

Visit #:  [id number]

Job #:  [id number]

## 2017-09-04 NOTE — HHI.HP
[facility]


Service


Family Medicine


Primary Care Physician


No Primary Care Physician


Admission Diagnosis





POSITIVE CULTURE R/O ENDOCARDITIS


Diagnoses:  


(1) Bacteremia


Diagnosis:  Principal





(2) Endocarditis, suspected


Diagnosis:  Principal





(3) Hepatitis B


Diagnosis:  Principal





(4) Pancytopenia


Diagnosis:  Principal





(5) Methadone maintenance therapy patient


Diagnosis:  Principal





(6) Tobacco abuse


Diagnosis:  Principal





(7) FEN/DVT PPX/GI PPX/Nursing Orders 


Diagnosis:  Principal





International Travel<30 Days:  No


Contact w/Intl Traveler<30days:  No


Known Affected Area:  No


History of Present Illness


Mr. Noah Curran is a 35-year-old male with a past medical history 

significant for IV drug use with consequential endocarditis diagnosed in 

2016, polysubstance abuse, hepatitis B, and vertebral osteomyelitis 

diagnosed in 2016 that presented to the [place] ED after being called to 

come in due to positive blood cultures. He was in the ED on Aug 27, 2016 with a 

chief complaint of right-sided chest pain and cough that was productive of 

brown sputum. He did not have any fever, chills or night sweats at that time. 

Blood cultures were drawn which grew coagulase-negative Staphylococcus in both 

anaerobic vials.





Today, he complains of fatigue and still has some mild chest pain. He denies 

cough, shortness of breath, fever, chills, or night sweats. He does have some 

difficulty with urination such that it is difficult to initiate.





Patient states that he has not used any IV drugs since his discharge from the 

hospital in 2016. He has been going to the methadone clinic and is 

currently on 50-60 mg of methadone daily. He continues to smoke and drinks a 

twist of gin daily.


He complains about some fatigue over the past few weeks prior to 

hospitalization. He normally has great strength and endurance for work.





Review of Systems


Other


Constitutional:  COMPLAINS OF: Fatigue, Weight loss, Change in appetite, DENIES

: Fever, Chills, Night Sweats


Eyes:  DENIES: Blurred vision


Ears, nose, mouth, throat:  DENIES: Nasal discharge, Throat pain, Running Nose, 

Sinus Pain


Respiratory:  DENIES: Cough, Shortness of breath


Cardiovascular:  COMPLAINS OF: Chest pain, DENIES: Palpitations


Gastrointestinal:  DENIES: Abdominal pain, Bloody stools, Constipation, Diarrhea

, Nausea, Vomiting


Genitourinary:  COMPLAINS OF: Dysuria (More difficulty with urination)


Musculoskeletal:  DENIES: Joint pain, Muscle aches


Integumentary:  DENIES: Pruritus, Rash


Neurologic:  DENIES: Headache


Psychiatric:  DENIES: Anxiety, Depression





Past Family Social History


Past Medical History


Mitral valve endocarditis in 2016


Vertebral osteomyelitis in 2016


Chronic back pain


Polysubstance IV drug abuse in past, now denies iv


street drug user currently "but not iv"


Tobacco abuse


Past Surgical History


Right tibia fracture repair with plates


Allergies:  


Coded Allergies:  


     *MDRO Multi-Drug Resistant Organism (Verified  Adverse Reaction, Unknown, )


 MRSA (sputum & blood) - 13


 MRSA (wound) - 08


 **MRSA PCR Screen POSITIVE - 2016**


Family History


Father has diabetes and hypertension


Social History


Current daily smoker, recently cut down to half to three-quarter pack per day


Drinks a twist of gin daily


 but 





Physical Exam


Vital Signs





Vital Signs








  Date Time  Temp Pulse Resp B/P (MAP) Pulse Ox O2 Delivery O2 Flow Rate FiO2


 


17 08:28 97.2 57 18 120/78 (92) 98   


 


17 05:24 97.7 57 20 110/68 (82) 99   


 


17 01:07 98.1 50 20 119/69 (86) 100   


 


9/3/17 20:30 97.5 50 17 114/79 (91) 100   


 


9/3/17 16:00 98.5 56 18 116/88 (97)    


 


9/3/17 13:58        


 


9/3/17 12:16 98.2 58 16 120/72 (88) 98 Room Air  


 


9/3/17 10:49   20     








Physical Exam


GENERAL: This is a well-developed patient, in no apparent distress.


SKIN: No rashes, ecchymoses or lesions. Cool and dry.


HEAD: Atraumatic. Normocephalic. 


EYES: Pupils equal round and reactive. Extraocular motions intact. No scleral 

icterus. No injection or drainage. slight asymmetry of eyes and face after 

multiple fracture


ENT: Nose without bleeding, purulent drainage or septal hematoma. Throat 

without erythema, tonsillar hypertrophy or exudate per exam in ED. Uvula 

midline. Airway patent.


NECK: Trachea midline. No JVD or lymphadenopathy. Small 0.5 mm nodule in the 

right lateral neck consistent with scar tissue. Supple, nontender, no meningeal 

signs.


CARDIOVASCULAR: Regular rate, regular rhythm, holosystolic murmur best heard in 

the mitral area


RESPIRATORY: Clear to auscultation. Breath sounds equal bilaterally. No wheezes

, rales, or rhonchi.  


GASTROINTESTINAL: Abdomen soft, non-tender, nondistended. No hepato-splenomegaly

, or palpable masses. No guarding.


MUSCULOSKELETAL: Extremities without clubbing, cyanosis, or edema. No joint 

tenderness, effusion, or edema noted. No calf tenderness. Negative Homans sign 

bilaterally.


NEUROLOGICAL: Very sleepy during exam but arousable on admission. more alert 

today. Cranial nerves II through XII intact.  Motor and sensory grossly within 

normal limits. Five out of 5 muscle strength in all muscle groups.  Normal 

speech.


Laboratory





Laboratory Tests








Test


  9/3/17


11:24 17


06:13 17


07:45


 


White Blood Count  2.4  


 


Red Blood Count  3.67  


 


Hemoglobin  11.2  


 


Hematocrit  34.0  


 


Mean Corpuscular Volume  92.7  


 


Mean Corpuscular Hemoglobin  30.5  


 


Mean Corpuscular Hemoglobin


Concent 


  32.9 


  


 


 


Red Cell Distribution Width  14.0  


 


Platelet Count  79  


 


Mean Platelet Volume  9.1  


 


Blood Urea Nitrogen  11  


 


Creatinine  0.86  


 


Random Glucose  69  


 


Total Protein  5.9  


 


Albumin  2.6  


 


Calcium Level  8.4  


 


Alkaline Phosphatase  166  


 


Aspartate Amino Transf


(AST/SGOT) 


  142 


  


 


 


Alanine Aminotransferase


(ALT/SGPT) 


  133 


  


 


 


Total Bilirubin  0.5  


 


Sodium Level  139  


 


Potassium Level  4.2  


 


Chloride Level  108  


 


Carbon Dioxide Level  26.5  


 


Anion Gap  5  


 


Estimat Glomerular Filtration


Rate 


  101 


  


 


 


Vancomycin Level Trough   14.9 














 Date/Time


Source Procedure


Growth Status


 


 


 9/3/17 09:00


Blood Peripheral Aerobic Blood Culture


Pending Received


 


 9/3/17 09:00


Blood Peripheral Anaerobic Blood Culture


Pending Received








Result Diagram:  


17 06





Imaging





Last Impressions








Chest X-Ray 9/3/17 0715 Signed





Impressions: 





 Service Date/Time:  Abrahan, September 3, 2017 07:32 - CONCLUSION: No acute 





 disease.       Jesús Singer MD 











Septic Shock Reassessment


Heart:  Regular rate and rhythm, Murmur


Lungs:  Clear


Skin:  Warm, Dry


Capillary Refill:  Brisk





Caprini VTE Risk Assessment


Caprini VTE Risk Assessment:  No/Low Risk (score <= 1)


Caprini Risk Assessment Model











 Point Value = 1          Point Value = 2  Point Value = 3  Point Value = 5


 


Age 41-60


Minor surgery


BMI > 25 kg/m2


Swollen legs


Varicose veins


Pregnancy or postpartum


History of unexplained or recurrent


   spontaneous 


Oral contraceptives or hormone


   replacement


Sepsis (< 1 month)


Serious lung disease, including


   pneumonia (< 1 month)


Abnormal pulmonary function


Acute myocardial infarction


Congestive heart failure (< 1 month)


History of inflammatory bowel disease


Medical patient at bed rest Age 61-74


Arthroscopic surgery


Major open surgery (> 45 min)


Laparoscopic surgery (> 45 min)


Malignancy


Confined to bed (> 72 hours)


Immobilizing plaster cast


Central venous access Age >= 75


History of VTE


Family history of VTE


Factor V Leiden


Prothrombin 54750B


Lupus anticoagulant


Anticardiolipin antibodies


Elevated serum homocysteine


Heparin-induced thrombocytopenia


Other congenital or acquired


   thrombophilia Stroke (< 1 month)


Elective arthroplasty


Hip, pelvis, or leg fracture


Acute spinal cord injury (< 1 month)








Prophylaxis Regimen











   Total Risk


Factor Score Risk Level Prophylaxis Regimen


 


0-1      Low Early ambulation


 


2 Moderate Order ONE of the following:


*Sequential Compression Device (SCD)


*Heparin 5000 units SQ BID


 


3-4 Higher Order ONE of the following medications:


*Heparin 5000 units SQ TID


*Enoxaparin/Lovenox 40 mg SQ daily (WT < 150 kg, CrCl > 30 mL/min)


*Enoxaparin/Lovenox 30 mg SQ daily (WT < 150 kg, CrCl > 10-29 mL/min)


*Enoxaparin/Lovenox 30 mg SQ BID (WT < 150 kg, CrCl > 30 mL/min)


AND/OR


*Sequential Compression Device (SCD)


 


5 or more Highest Order ONE of the following medications:


*Heparin 5000 units SQ TID (Preferred with Epidurals)


*Enoxaparin/Lovenox 40 mg SQ daily (WT < 150 kg, CrCl > 30 mL/min)


*Enoxaparin/Lovenox 30 mg SQ daily (WT < 150 kg, CrCl > 10-29 mL/min)


*Enoxaparin/Lovenox 30 mg SQ BID (WT < 150 kg, CrCl > 30 mL/min)


AND


*Sequential Compression Device (SCD)











Assessment and Plan


Assessment and Plan


35-year-old  male with a past medical history significant for IV drug 

use, polysubstance abuse, hepatitis B, and endocarditis presents with chief 

complaint of fatigue and anaerobic blood cultures positive for coagulase-

negative Staphylococcus aureus. Patient does have a 3/6 holosystolic mitral 

valve murmur which was present during last admission in 2016. He 

would need to be reassessed for endocarditis due to persistent murmur and 

positive blood cultures.


Problem List:  


(1) Bacteremia


ICD Codes:  R78.81 - Bacteremia


Status:  Acute


Plan:  -Possible source is mitral valve endocarditis. Blood cultures from  growing coagulase-negative staph in anaerobic vials 2


-Patient reports feeling fatigued although no fever or chills


-CBC shows a WBC of 3.5 with 49.4 % neutrophils; lactate of 0.8


-CXR does not indicate an acute pulmonary process 


-UA showed negative leukocyte esterase, negative occult blood, and 1 RBC - 

culture not indicated 


-EKG ordered in the ED showed normal sinus rhythm


unfortunately, once someone has had endocarditis once, their valves are more 

susceptible to it again so will check carefully





Plan


-2-D echo in the a.m.


-Cardiology consulted for possible SCOTTY, appreciate help ID, will wait prior to 

getting SCOTTY only if indicated with multiple positive cultures


-Repeated blood cultures


-Vancomycin 1 g IV every 8 with pharmacy consult (started 9/3/17)





(2) Endocarditis, suspected


ICD Codes:  Z03.89 - Encounter for observation for other suspected diseases and 

conditions ruled out


Plan:  -See plan above for bacteremia





(3) Hepatitis B


ICD Codes:  B19.10 - Unspecified viral hepatitis B without hepatic coma


Status:  Acute


Plan:  -Patient was diagnosed with hepatitis B infection at last admission in 

2016


-checked a quantitative hepatitis B and C viral loads. the Hepatitis B is 

active. he can have follow up and treatment as an outpt per GI.





(4) Pancytopenia


ICD Codes:  D61.818 - Other pancytopenia


Plan:  -H&H 12.1/36.9 respectively


-WBC low at 3.5


-Platelets 97


-Possibly from poor nutrition or bacteremia


-We'll continue to monitor with daily CBCs


unclear if this could be also from Hep B. hep C can cause some pancytopenia. 

can continue to investigate if it does not improve





(5) Methadone maintenance therapy patient


ICD Codes:  F11.20 - Opioid dependence, uncomplicated


Status:  Acute


Plan:  -History of polysubstance and IV drug use


-UDS positive for cocaine and amphetamines, negative for opiates


-Continue methadone 60 mg by mouth daily





(6) Tobacco abuse


ICD Codes:  Z72.0 - Tobacco use


Plan:  -Nicotine patch 7.5 mg





(7) FEN/DVT PPX/GI PPX/Nursing Orders 


Plan:  Fluids: NS @ 125 mls/hr IV


Electrolytes: Will monitor and replace as needed, a.m. labs


Nutrition: Regular adult diet


DVT Prophylaxis: Lovenox 40mg daily


GI Prophylaxis: None required currently


Constipation prophylaxis: Nadya-colase 1 tab by mouth twice a day


Tylenol 650 mg by mouth every 4 hours when necessary pain 1-10 a temperature 

greater than 100.4F


Zofran 4 mg IV push every 6 hours when necessary nausea vomiting


Restoril 15 mg by mouth at bedtime when necessary insomnia





-Vitals Q4h


-Monitor I's and O's


-Fall precautions


-Activity OOB with assistance


-PT to assist with ambulation


-Case management consult to assist with discharge disposition, he will likely 

go back home once he is able. he has a steady job





Disposition: Pending blood cultures, echo, clinical improvement








Physician Certification


2 Midnight Certification Type:  Admission for Inpatient Services


Order for Inpatient Services


The services are ordered in accordance with Medicare regulations or non-

Medicare payer requirements, as applicable.  In the case of services not 

specified as inpatient-only, they are appropriately provided as inpatient 

services in accordance with the 2-midnight benchmark.


Estimated LOS (days):  4


4 days is the estimated time the patient will need to remain in the hospital, 

assuming treatment plan goals are met and no additional complications.


Post-Hospital Plan:  Home





Problem Qualifiers





(1) Hepatitis B:  


Qualified Codes:  B18.1 - Chronic viral hepatitis B without delta-agent








Sonia Turcios MD Sep 4, 2017 10:30

## 2017-09-04 NOTE — PD.ID.CON
History of Present Illness


Service


ID


Consult Requested By


Dr Andrew


Reason for Consult


coag negative staph bacteremia


Primary Care Physician


No Primary Care Physician


Diagnoses:  


History of Present Illness


37 yo male with h/o IVDU and Acinetobacter/vir strep endocarditis  almost a 

year ago came few days ago with chest pain and UTI smx, non productive cough, 

he was d/c'd from ER and blood clx were done


Pt denies any fever, chills and nightsweats and no fever was documanted


One of the bottles came back for Coag negative staph with 2 morphologies


Pt denies using IV drugs, but his drug diane t was  positive  for amphetamines 

and cocaine


He has normal WBC 


He remains afebrile


His repeat blood clx are negative and his first 2 sets also negative for the 

exception of one bottle


He was started on vancomycin on admission





Review of Systems


ROS Limitations:  Poor Historian


Except as stated in HPI:  all other systems reviewed are Neg





Past Family Social History


Allergies:  


Coded Allergies:  


     *MDRO Multi-Drug Resistant Organism (Verified  Adverse Reaction, Unknown, 9 /13/17)


 MRSA (sputum & blood) - 6/25/13


 MRSA (wound) - 9/29/08


 **MRSA PCR Screen POSITIVE - 6/28/2016**


Past Medical History


Mitral valve endocarditis in September 2016 due to vir strep, Acinetobacter


Vertebral osteomyelitis in July 2016


Chronic back pain


Polysubstance IV drug abuse


Tobacco abuse


Past Surgical History


Right tibia fracture repair with plates


Active Ordered Medications


Medications where reviewed in EMR


Antibiotics Include:  vancomcyin





Family History


 


Father has diabetes and hypertension





Social History


 


Current daily smoker, < 1 pack per day


Drinks a twist of gin daily


positive for amphetamines and cocaine on admission





Physical Exam


Vital Signs





Vital Signs








  Date Time  Temp Pulse Resp B/P (MAP) Pulse Ox O2 Delivery O2 Flow Rate FiO2


 


9/4/17 12:10 97.9 63 18 109/73 (85) 98   


 


9/4/17 08:28 97.2 57 18 120/78 (92) 98   


 


9/4/17 05:24 97.7 57 20 110/68 (82) 99   


 


9/4/17 01:07 98.1 50 20 119/69 (86) 100   


 


9/3/17 20:30 97.5 50 17 114/79 (91) 100   


 


9/3/17 16:00 98.5 56 18 116/88 (97)    


 


9/3/17 13:58        








Physical Exam


CONSTITUTIONAL/GENERAL: This is a thin young male patient, in no apparent 

distress.


TUBES/LINES/DRAINS:


SKIN: No jaundice, rashes, or lesions.  Skin temperature appropriate. Not 

diaphoretic. 


HEAD: Atraumatic. Normocephalic.


EYES: Pupils equal and round and reactive. Extraocular motions intact. No 

scleral icterus. No injection or drainage. Fundi not examined.


ENT: Hearing grossly normal. Nose without bleeding or purulent drainage. Oral 

mucosae without visible erythema, exudates, masses, or lesions. Edentulous 


NECK: Trachea midline. Supple, nontender.  . 


CARDIOVASCULAR: Regular rate and rhythm without murmurs, gallops, or rubs. No 

JVD. Peripheral pulses symmetric.


RESPIRATORY/CHEST: Symmetric, unlabored respirations. Clear to auscultation. 

Breath sounds equal bilaterally. No wheezes, rales, or rhonchi.  


GASTROINTESTINAL: Abdomen soft, non-tender, nondistended. No hepato-splenomegaly

, or palpable masses. No guarding. Bowel sounds present.


GENITOURINARY: Without palpable bladder distension.  


MUSCULOSKELETAL: Extremities without clubbing, cyanosis, or edema. No joint 

tenderness or effusion noted. No calf tenderness. No mottling or clubbing.


LYMPHATICS: No palpable cervical or supraclavicular adenopathy.


NEUROLOGICAL: Awake and alert. Motor and sensory grossly within normal limits. 

Follows commands. Cognitively sharp. Moves all extremities.


PSYCHIATRIC: Flat affect


Laboratory





Laboratory Tests








Test


  9/4/17


06:13 9/4/17


07:45


 


White Blood Count 2.4  


 


Red Blood Count 3.67  


 


Hemoglobin 11.2  


 


Hematocrit 34.0  


 


Mean Corpuscular Volume 92.7  


 


Mean Corpuscular Hemoglobin 30.5  


 


Mean Corpuscular Hemoglobin


Concent 32.9 


  


 


 


Red Cell Distribution Width 14.0  


 


Platelet Count 79  


 


Mean Platelet Volume 9.1  


 


Blood Urea Nitrogen 11  


 


Creatinine 0.86  


 


Random Glucose 69  


 


Total Protein 5.9  


 


Albumin 2.6  


 


Calcium Level 8.4  


 


Alkaline Phosphatase 166  


 


Aspartate Amino Transf


(AST/SGOT) 142 


  


 


 


Alanine Aminotransferase


(ALT/SGPT) 133 


  


 


 


Total Bilirubin 0.5  


 


Sodium Level 139  


 


Potassium Level 4.2  


 


Chloride Level 108  


 


Carbon Dioxide Level 26.5  


 


Anion Gap 5  


 


Estimat Glomerular Filtration


Rate 101 


  


 


 


Vancomycin Level Trough  14.9 














 Date/Time


Source Procedure


Growth Status


 


 


 9/3/17 09:00


Blood Peripheral Aerobic Blood Culture - Preliminary


NO GROWTH IN 1 DAY Resulted


 


 9/3/17 09:00


Blood Peripheral Anaerobic Blood Culture - Preliminary


NO GROWTH IN 1 DAY Resulted








Result Diagram:  


9/4/17 0613 9/4/17 0613





Imaging





Last Impressions








Chest X-Ray 9/3/17 0715 Signed





Impressions: 





 Service Date/Time:  Abrahan, September 3, 2017 07:32 - CONCLUSION: No acute 





 disease.       Jesús Singer MD 











Assessment and Plan


Assessment and Plan


IVDU


H/o mitral valve endocarditis


Coag negative staph bacteremia, cw contamination (1/4 bottles, 2 different 

morphologies)


Another blood culture just became positive for GPC in pair s and clusters after 

I saw the pt 


   - ID/S P


Hep C/hep B co-infection, nnot  on treatment and kieeps drining heavily  





   cont  vancomycin for now


   fu repeat blood clx


   2 D echo


   will hold off SCOTTY until more possible  endocarditis criteria are met (repeat 

blood clx with same organism) and /or vegetatio on 2 D echo


Discussed Condition With


Destiny Marte MD Sep 4, 2017 13:55

## 2017-09-05 VITALS
SYSTOLIC BLOOD PRESSURE: 129 MMHG | DIASTOLIC BLOOD PRESSURE: 76 MMHG | OXYGEN SATURATION: 97 % | RESPIRATION RATE: 18 BRPM | HEART RATE: 69 BPM | TEMPERATURE: 97.6 F

## 2017-09-05 VITALS
HEART RATE: 61 BPM | SYSTOLIC BLOOD PRESSURE: 110 MMHG | DIASTOLIC BLOOD PRESSURE: 64 MMHG | RESPIRATION RATE: 16 BRPM | TEMPERATURE: 97.9 F | OXYGEN SATURATION: 98 %

## 2017-09-05 VITALS
TEMPERATURE: 98 F | SYSTOLIC BLOOD PRESSURE: 121 MMHG | OXYGEN SATURATION: 98 % | DIASTOLIC BLOOD PRESSURE: 81 MMHG | RESPIRATION RATE: 16 BRPM | HEART RATE: 59 BPM

## 2017-09-05 VITALS
RESPIRATION RATE: 18 BRPM | SYSTOLIC BLOOD PRESSURE: 110 MMHG | OXYGEN SATURATION: 97 % | DIASTOLIC BLOOD PRESSURE: 76 MMHG | TEMPERATURE: 98.2 F | HEART RATE: 58 BPM

## 2017-09-05 VITALS
DIASTOLIC BLOOD PRESSURE: 88 MMHG | RESPIRATION RATE: 16 BRPM | OXYGEN SATURATION: 98 % | TEMPERATURE: 97.8 F | SYSTOLIC BLOOD PRESSURE: 131 MMHG | HEART RATE: 57 BPM

## 2017-09-05 VITALS — OXYGEN SATURATION: 97 %

## 2017-09-05 LAB
ALP SERPL-CCNC: 172 U/L (ref 45–117)
ALT SERPL-CCNC: 148 U/L (ref 12–78)
ANION GAP SERPL CALC-SCNC: 2 MEQ/L (ref 5–15)
AST SERPL-CCNC: 183 U/L (ref 15–37)
BASOPHILS # BLD AUTO: 0 TH/MM3 (ref 0–0.2)
BASOPHILS NFR BLD: 0.7 % (ref 0–2)
BILIRUB SERPL-MCNC: 0.6 MG/DL (ref 0.2–1)
BUN SERPL-MCNC: 11 MG/DL (ref 7–18)
CHLORIDE SERPL-SCNC: 106 MEQ/L (ref 98–107)
EOSINOPHIL # BLD: 0 TH/MM3 (ref 0–0.4)
EOSINOPHIL NFR BLD: 2.1 % (ref 0–4)
ERYTHROCYTE [DISTWIDTH] IN BLOOD BY AUTOMATED COUNT: 13.9 % (ref 11.6–17.2)
GFR SERPLBLD BASED ON 1.73 SQ M-ARVRAT: 97 ML/MIN (ref 89–?)
HCO3 BLD-SCNC: 28.1 MEQ/L (ref 21–32)
HCT VFR BLD CALC: 33.8 % (ref 39–51)
HEMO FLAGS: (no result)
LYMPHOCYTES # BLD AUTO: 0.9 TH/MM3 (ref 1–4.8)
LYMPHOCYTES NFR BLD AUTO: 43.8 % (ref 9–44)
MCH RBC QN AUTO: 30.9 PG (ref 27–34)
MCHC RBC AUTO-ENTMCNC: 33.4 % (ref 32–36)
MCV RBC AUTO: 92.5 FL (ref 80–100)
MONOCYTES NFR BLD: 5.6 % (ref 0–8)
NEUTROPHILS # BLD AUTO: 1 TH/MM3 (ref 1.8–7.7)
NEUTROPHILS NFR BLD AUTO: 47.8 % (ref 16–70)
PLAT MORPH BLD: NORMAL
PLATELET # BLD: 78 TH/MM3 (ref 150–450)
PLATELET BLD QL SMEAR: (no result)
POTASSIUM SERPL-SCNC: 4.3 MEQ/L (ref 3.5–5.1)
RBC # BLD AUTO: 3.65 MIL/MM3 (ref 4.5–5.9)
RBC MORPH BLD: NORMAL
SCAN/DIFF: (no result)
SODIUM SERPL-SCNC: 136 MEQ/L (ref 136–145)
WBC # BLD AUTO: 2.1 TH/MM3 (ref 4–11)

## 2017-09-05 RX ADMIN — PHENYTOIN SODIUM SCH MLS/HR: 50 INJECTION INTRAMUSCULAR; INTRAVENOUS at 15:54

## 2017-09-05 RX ADMIN — STANDARDIZED SENNA CONCENTRATE AND DOCUSATE SODIUM SCH TAB: 8.6; 5 TABLET, FILM COATED ORAL at 20:51

## 2017-09-05 RX ADMIN — VANCOMYCIN HYDROCHLORIDE SCH MLS/HR: 1 INJECTION, SOLUTION INTRAVENOUS at 23:49

## 2017-09-05 RX ADMIN — STANDARDIZED SENNA CONCENTRATE AND DOCUSATE SODIUM SCH TAB: 8.6; 5 TABLET, FILM COATED ORAL at 09:27

## 2017-09-05 RX ADMIN — Medication SCH ML: at 09:00

## 2017-09-05 RX ADMIN — Medication SCH ML: at 20:51

## 2017-09-05 RX ADMIN — VANCOMYCIN HYDROCHLORIDE SCH MLS/HR: 1 INJECTION, SOLUTION INTRAVENOUS at 09:27

## 2017-09-05 RX ADMIN — NICOTINE SCH PATCH: 7 PATCH, EXTENDED RELEASE TOPICAL at 09:26

## 2017-09-05 RX ADMIN — ENOXAPARIN SODIUM SCH MG: 40 INJECTION SUBCUTANEOUS at 09:26

## 2017-09-05 RX ADMIN — PHENYTOIN SODIUM SCH MLS/HR: 50 INJECTION INTRAMUSCULAR; INTRAVENOUS at 06:20

## 2017-09-05 RX ADMIN — VANCOMYCIN HYDROCHLORIDE SCH MLS/HR: 1 INJECTION, SOLUTION INTRAVENOUS at 15:54

## 2017-09-05 RX ADMIN — METHADONE HYDROCHLORIDE SCH MG: 10 TABLET ORAL at 09:26

## 2017-09-05 NOTE — PD.CARD.PN
Subjective


Subjective Remarks


No events overnight





No chest pain/SOB





Objective


Medications





Current Medications








 Medications


  (Trade)  Dose


 Ordered  Sig/Eduardo


 Route  Start Time


 Stop Time Status Last Admin


 


  (Proair Hfa Inh)  2 puff  Q4H  PRN


 INH  9/3/17 09:00


     


 


 


  (Dolophine)  60 mg  DAILY


 PO  9/3/17 09:00


    9/5/17 09:26


 


 


 Sodium Chloride  1,000 ml @ 


 125 mls/hr  Q8H


 IV  9/3/17 08:56


    9/5/17 06:20


 


 


  (NS Flush)  2 ml  UNSCH  PRN


 IV FLUSH  9/3/17 09:00


     


 


 


  (NS Flush)  2 ml  BID


 IV FLUSH  9/3/17 09:00


    9/4/17 20:58


 


 


  (Tylenol)  650 mg  Q4H  PRN


 PO  9/3/17 09:00


     


 


 


  (Zofran Inj)  4 mg  Q6H  PRN


 IVP  9/3/17 10:00


     


 


 


  (Restoril)  15 mg  HS  PRN


 PO  9/3/17 21:00


     


 


 


  (Lovenox Inj)  40 mg  Q24H


 SQ  9/3/17 10:00


    9/5/17 09:26


 


 


  (Nadya-Colace)  1 tab  BID


 PO  9/3/17 09:00


    9/5/17 09:27


 


 


  (Habitrol 7 Mg


 Patch.24 Hr)  1 patch  DAILY


 T-DERMAL  9/3/17 09:15


    9/5/17 09:26


 


 


 Miscellaneous


 Information  1  HS


 T-DERMAL  9/3/17 21:00


    9/4/17 20:59


 


 


 Pharmacy Profile


 Note  0 ml @ 0


 mls/hr  UNSCH


 OTHER  9/3/17 09:15


     


 


 


 Vancomycin HCl


 1000 mg/Sodium


 Chloride  250 ml @ 


 250 mls/hr  Q8H


 IV  9/3/17 16:00


    9/5/17 09:27


 


 


 Miscellaneous


 Information  SPECIFIC LAB TO BE


 DRAWN:VANCOMYCIN


 TROUGH DATE TO...  ONCE  ONCE


 .XX  9/6/17 07:45


 9/6/17 07:46   


 








Vital Signs / I&O





Vital Signs








  Date Time  Temp Pulse Resp B/P (MAP) Pulse Ox O2 Delivery O2 Flow Rate FiO2


 


9/5/17 11:44 97.8 57 16 131/88 (102) 98   


 


9/5/17 09:51     97   21


 


9/5/17 08:13 98.2 58 18 110/76 (87) 97   


 


9/5/17 03:39 97.6 69 18 129/76 (93) 97   


 


9/4/17 23:40 97.8 64 19 122/81 (95) 98   


 


9/4/17 21:27        21


 


9/4/17 20:47 97.6 54 17 113/69 (84) 98   


 


9/4/17 16:41 97.9 60 20 107/75 (86) 97   














I/O      


 


 9/4/17 9/4/17 9/4/17 9/5/17 9/5/17 9/5/17





 07:00 15:00 23:00 07:00 15:00 23:00


 


Intake Total  480 ml 250 ml 1250 ml  


 


Balance  480 ml 250 ml 1250 ml  


 


      


 


Intake Oral  480 ml    


 


IV Total   250 ml 1250 ml  


 


# Voids 2 3    








Physical Exam


GENERAL: NAD, AAOx3


SKIN: Warm and dry.


HEAD: Atraumatic. Normocephalic. 


EYES: Pupils equal and round. No scleral icterus. No injection or drainage. 


ENT: No nasal bleeding or discharge.  Mucous membranes pink and moist.


NECK: Trachea midline. No JVD. 


CARDIOVASCULAR: Regular rate and rhythm.  Holosystolic murmur at the apex.


RESPIRATORY: No accessory muscle use. Clear to auscultation. Breath sounds 

equal bilaterally. 


GASTROINTESTINAL: Abdomen soft, non-tender, nondistended. Hepatic and splenic 

margins not palpable. 


MUSCULOSKELETAL: Extremities without clubbing, cyanosis, or edema. No obvious 

deformities. 


NEUROLOGICAL: Awake and alert. No obvious cranial nerve deficits.  Motor 

grossly within normal limits. Five out of 5 muscle strength in the arms and 

legs.  Normal speech.


PSYCHIATRIC: Appropriate mood and affect; insight and judgment normal.


Laboratory





Laboratory Tests








Test


  9/5/17


10:44


 


White Blood Count 2.1 TH/MM3 


 


Red Blood Count 3.65 MIL/MM3 


 


Hemoglobin 11.3 GM/DL 


 


Hematocrit 33.8 % 


 


Mean Corpuscular Volume 92.5 FL 


 


Mean Corpuscular Hemoglobin 30.9 PG 


 


Mean Corpuscular Hemoglobin


Concent 33.4 % 


 


 


Red Cell Distribution Width 13.9 % 


 


Platelet Count 78 TH/MM3 


 


Mean Platelet Volume 9.7 FL 


 


Neutrophils (%) (Auto) 47.8 % 


 


Lymphocytes (%) (Auto) 43.8 % 


 


Monocytes (%) (Auto) 5.6 % 


 


Eosinophils (%) (Auto) 2.1 % 


 


Basophils (%) (Auto) 0.7 % 


 


Neutrophils # (Auto) 1.0 TH/MM3 


 


Lymphocytes # (Auto) 0.9 TH/MM3 


 


Monocytes # (Auto) 0.1 TH/MM3 


 


Eosinophils # (Auto) 0.0 TH/MM3 


 


Basophils # (Auto) 0.0 TH/MM3 


 


CBC Comment AUTO DIFF 


 


Differential Comment


  AUTO DIFF


CONFIRMED


 


Platelet Estimate LOW 


 


Platelet Morphology Comment NORMAL 


 


Red Cell Morphology Comment NORMAL 


 


Blood Urea Nitrogen 11 MG/DL 


 


Creatinine 0.89 MG/DL 


 


Random Glucose 87 MG/DL 


 


Total Protein 6.0 GM/DL 


 


Albumin 2.4 GM/DL 


 


Calcium Level 7.9 MG/DL 


 


Alkaline Phosphatase 172 U/L 


 


Aspartate Amino Transf


(AST/SGOT) 183 U/L 


 


 


Alanine Aminotransferase


(ALT/SGPT) 148 U/L 


 


 


Total Bilirubin 0.6 MG/DL 


 


Sodium Level 136 MEQ/L 


 


Potassium Level 4.3 MEQ/L 


 


Chloride Level 106 MEQ/L 


 


Carbon Dioxide Level 28.1 MEQ/L 


 


Anion Gap 2 MEQ/L 


 


Estimat Glomerular Filtration


Rate 97 ML/MIN 


 











Assessment and Plan


Problem List:  


(1) Bacteremia


ICD Codes:  R78.81 - Bacteremia


Status:  Acute


(2) Endocarditis, suspected


ICD Codes:  Z03.89 - Encounter for observation for other suspected diseases and 

conditions ruled out


(3) Hepatitis C


ICD Codes:  B19.20 - Unspecified viral hepatitis C without hepatic coma


Status:  Acute


(4) Hepatitis B


ICD Codes:  B19.10 - Unspecified viral hepatitis B without hepatic coma


Status:  Acute


(5) Tobacco abuse


ICD Codes:  Z72.0 - Tobacco use


(6) History of drug abuse


ICD Codes:  Z87.898 - Personal history of other specified conditions


Assessment and Plan


1) Questionable bacteremia in 1 bottle


2) Echo showing mobile structure on the anterior leaflet of the mitral valve, 

consistent with endocarditis


   At least moderate mitral regurgitation, probably more severe (previously mod-

sev) will plan for SCOTTY tomorrow to further define


   Probable [employer], although cannot rule out endocarditis of the 

Eustachian valve





Problem Qualifiers





(1) Hepatitis B:  


Qualified Codes:  B18.1 - Chronic viral hepatitis B without delta-agent








Giorgio Escalona DO Sep 5, 2017 14:11

## 2017-09-05 NOTE — ECHRPT
Indication:   SEPSIS

 

 CONCLUSIONS

 The left ventricular systolic function is low normal with an estimated ejection fraction in the rang
e of 50-

 55%.

  

 Mobile structure off the Eustation valve, usually a [employer] (benign finding) but with probabl
y 

 endocarditis of the mitral valve cannot rule out possible small endocarditis of the Eustation valve.


 

 Moderate thickening of the mitral valve leaflets. 

 Mobile density (0.9cm x 0.6cm) noted on the anterior leaflet of the mitral valve consistent with end
ocarditis.

 Moderate mitral regurgitation which is extremely eccentric and anterior along the atrial septum.

 

 

 BP:  110   / 68      HR: 57                       Rhythm:           Sinus

 

 MEASUREMENTS  (Male / Female) Normal Values       Technical Quality:Good

 2D ECHO

 LV Diastolic Diameter PLAX        5.2 cm                4.2 - 5.9 / 3.9 - 5.3 cm

 LV Systolic Diameter PLAX         3.8 cm                

 IVS Diastolic Thickness           1.1 cm                0.6 - 1.0 / 0.6 - 0.9 cm

 LVPW Diastolic Thickness          1.1 cm                0.6 - 1.0 / 0.6 - 0.9 cm

 LV Relative Wall Thickness        0.4                   

 RV Internal Dim ED PLAX           2.6 cm                

 LVOT Diameter                     2.0 cm                

 LA Systolic Diameter LX           4.1 cm                3.0 - 4.0 / 2.7 - 3.8 cm

 

 M-MODE

 Aortic Root Diameter MM           3.4 cm                

 AV Cusp Separation MM             2.7 cm                

 

 DOPPLER

 AV Peak Velocity                  116.0 cm/s            

 AV Peak Gradient                  5.4 mmHg              

 LVOT Peak Velocity                122.0 cm/s            

 LVOT Peak Gradient                6.0 mmHg              

 AV Area Cont Eq pk                3.3 cm               

 MV Area PHT                       3.0 cm               

 Mitral E Point Velocity           120.0 cm/s            

 Mitral A Point Velocity           75.0 cm/s             

 Mitral E to A Ratio               1.6                   

 LV E' Lateral Velocity            7.5 cm/s              

 Mitral E to LV E' Lateral Ratio   16.0                  

 LV E' Septal Velocity             8.7 cm/s              

 Mitral E to LV E' Septal Ratio    13.8                  

 TR Peak Velocity                  267.0 cm/s            

 TR Peak Gradient                  28.5 mmHg             

 PV Peak Velocity                  73.3 cm/s             

 PV Peak Gradient                  2.1 mmHg              

 

 

 FINDINGS

 

 LEFT VENTRICLE

 The left ventricular systolic function is low normal with an estimated ejection fraction in the rang
e of 50-

 55%. 

 Normal left ventricular size. 

 Wall thickness is normal. 

 No regional wall motion abnormalities are present. 

 

 RIGHT VENTRICLE

 Normal right ventricular size and systolic function.  

 

 LEFT ATRIUM

 The left atrial size is mildly dilated. 

 

 

 RIGHT ATRIUM

 The right atrial size is normal.  

 Mobile structure off the Eustation valve, usually a [employer] (benign finding) but with probabl
y 

 endocarditis of the mitral valve cannot rule out possible small endocarditis of the Eustation valve.


 

 ATRIAL SEPTUM

 Normal atrial septal thickness without atrial level shunting by limited color doppler interrogation.
  

 

 AORTA

 The aortic root and proximal ascending aorta are normal in size on limited imaging.  

 

 MITRAL VALVE

 Moderate thickening of the mitral valve leaflets. 

 Mobile density (0.9cm x 0.6cm) noted on the anterior leaflet of the mitral valve consistent with end
ocarditis.

 Moderate mitral regurgitation which is extremely eccentric and anterior along the atrial septum.

 

 

 

 AORTIC VALVE

 Trileaflet aortic valve. No aortic valve stenosis or regurgitation.  

 

 TRICUSPID VALVE

 Structurally normal tricuspid valve. 

 There is trace tricuspid valve regurgitation. 

 The estimated pulmonary arterial pressure is 39 mmHg. 

 

 PULMONARY VALVE

 The pulmonary valve is not well visualized.  

 

 VESSELS

 The inferior vena cava is normal in size.  

 

 PERICARDIUM

 No pericardial effusion.  

 

 

 

 

  Giorgio Escalona DO

  (Electronically Signed)

  Final Date:05 September 2017 12:07

## 2017-09-05 NOTE — PD.CONS
HPI


History of Present Illness


This is a 36 year old male with a history of IVDA, recurrent endocarditis 

involving the mitral valve, osteomyelitis, and chronic hepatitis B.  We 

evaluated him in June of 2016 for acute hepatitis b and hepatitis C.  Workup at 

that time revealed:  Liver US (6/23/16) revealed 1. Mild hepatosplenomegaly and 

echogenic liver characteristic of hepatic steatosis or underlying 

hepatocellular disease.  2. Small amount of sludge in the gallbladder.  

Hepatitis profile (6/9/16)---> Hepatitis B surface antigen positive, hepatitis 

B core IgM antibodies-indeterminate high, Hepatitis B DNA > 170,000,000.  

Hepatitis Be Ag positive.  During that hospitalization, he had stated that he 

was with a girl 2 months prior that told him she had antibodies for hepatitis b

, but not an active infection.  In addition, he had also recently shared 

needles with his ex wife 2 weeks prior to this hospitalization.  Of note, he 

was found to have antibodies for HCV at that time, but an undetectable viral 

load/genotype. He was also checked for hepatitis Delta (7/20/16) and this was 

negative.  We then saw him in September/October of 2016 during a 

hospitalization for endocarditis for questionable chronic hepatitis B infection 

vs. subacute infection.  Of note, he was still using IV drugs at that time.  

Labs during that hospitalization revealed Hepatitis B surface Ag positive, 

Hepatitis C antibodies (+), Hepatitis Be Antibody nonreactive, Hepatitis Be 

antigen reactive, Hepatitis B DNA > 170,000,000.  HCV viral load was 

undetectable.  He was evaluated with EGD (10/6/16)----> erosive gastritis. 

Pathology with non-specific mild chronic inflammation, no helicobacter pylori-

like organisms are present.  The plan was for him to follow up in the office, 

at which time a decision would be made with regards to starting treatment for 

his hepatitis B.  Of note, his LFTs on 10/24/16 were T. Bili 0.5, AST 46, ALT 46

, Alk Phosph 94.  The patient reports that he presented to the ER for 

evaluation of chest pain.  He presented to the ER for evaluation of fatigue and 

chest discomfort.  He reports that he has not used any IV drugs since November of 2016 and goes to the methadone clinic, where he takes 50-60 mg of methadone 

daily. He was noted to have Staph Coagulase Negative bacteremia.  ID is 

following and he is getting Vancomycin and scheduled for a SCOTTY in the am. He 

denies the use of any Tylenol products, but continues to drink 1 twisted tea 

daily.  He was noted to have elevated LFTs.  Today, these are T. Bili 0.6, AST 

183, , Alk Phosph 172.  Hepatitis Viral load, DNA PCR, Hepatitis C viral 

load pending.  He denies any nausea, vomiting, diarrhea, fever, or chills.  GI 

has been consulted for further evaluation of elevated LFTs/chronic hepatitis b 

infection.





[facility]


Coded Allergies:  


     *MDRO Multi-Drug Resistant Organism (Verified  Adverse Reaction, Unknown, 8 /27/17)


 MRSA (sputum & blood) - 6/25/13


 MRSA (wound) - 9/29/08


 **MRSA PCR Screen POSITIVE - 6/28/2016**





GI Exam


Vitals I&O





Vital Signs








  Date Time  Temp Pulse Resp B/P (MAP) Pulse Ox O2 Delivery O2 Flow Rate FiO2


 


9/5/17 11:44 97.8 57 16 131/88 (102) 98   


 


9/5/17 09:51     97   21


 


9/5/17 08:13 98.2 58 18 110/76 (87) 97   


 


9/5/17 03:39 97.6 69 18 129/76 (93) 97   


 


9/4/17 23:40 97.8 64 19 122/81 (95) 98   


 


9/4/17 21:27        21


 


9/4/17 20:47 97.6 54 17 113/69 (84) 98   


 


9/4/17 16:41 97.9 60 20 107/75 (86) 97   














I/O      


 


 9/4/17 9/4/17 9/4/17 9/5/17 9/5/17 9/5/17





 07:00 15:00 23:00 07:00 15:00 23:00


 


Intake Total  480 ml 250 ml 1250 ml 480 ml 


 


Balance  480 ml 250 ml 1250 ml 480 ml 


 


      


 


Intake Oral  480 ml   480 ml 


 


IV Total   250 ml 1250 ml  


 


# Voids 2 3   3 


 


# Bowel Movements     1 








Laboratory











Test


  9/5/17


10:44


 


White Blood Count 2.1 TH/MM3 


 


Red Blood Count 3.65 MIL/MM3 


 


Hemoglobin 11.3 GM/DL 


 


Hematocrit 33.8 % 


 


Mean Corpuscular Volume 92.5 FL 


 


Mean Corpuscular Hemoglobin 30.9 PG 


 


Mean Corpuscular Hemoglobin


Concent 33.4 % 


 


 


Red Cell Distribution Width 13.9 % 


 


Platelet Count 78 TH/MM3 


 


Mean Platelet Volume 9.7 FL 


 


Neutrophils (%) (Auto) 47.8 % 


 


Lymphocytes (%) (Auto) 43.8 % 


 


Monocytes (%) (Auto) 5.6 % 


 


Eosinophils (%) (Auto) 2.1 % 


 


Basophils (%) (Auto) 0.7 % 


 


Neutrophils # (Auto) 1.0 TH/MM3 


 


Lymphocytes # (Auto) 0.9 TH/MM3 


 


Monocytes # (Auto) 0.1 TH/MM3 


 


Eosinophils # (Auto) 0.0 TH/MM3 


 


Basophils # (Auto) 0.0 TH/MM3 


 


CBC Comment AUTO DIFF 


 


Differential Comment


  AUTO DIFF


CONFIRMED


 


Platelet Estimate LOW 


 


Platelet Morphology Comment NORMAL 


 


Red Cell Morphology Comment NORMAL 


 


Blood Urea Nitrogen 11 MG/DL 


 


Creatinine 0.89 MG/DL 


 


Random Glucose 87 MG/DL 


 


Total Protein 6.0 GM/DL 


 


Albumin 2.4 GM/DL 


 


Calcium Level 7.9 MG/DL 


 


Alkaline Phosphatase 172 U/L 


 


Aspartate Amino Transf


(AST/SGOT) 183 U/L 


 


 


Alanine Aminotransferase


(ALT/SGPT) 148 U/L 


 


 


Total Bilirubin 0.6 MG/DL 


 


Sodium Level 136 MEQ/L 


 


Potassium Level 4.3 MEQ/L 


 


Chloride Level 106 MEQ/L 


 


Carbon Dioxide Level 28.1 MEQ/L 


 


Anion Gap 2 MEQ/L 


 


Estimat Glomerular Filtration


Rate 97 ML/MIN 


 














 Date/Time


Source Procedure


Growth Status


 


 


 9/3/17 09:00


Blood Peripheral Aerobic Blood Culture - Preliminary


Staph Sp Coagulase Negative Resulted


 


 9/3/17 09:00


Blood Peripheral Anaerobic Blood Culture - Preliminary


NO GROWTH IN 2 DAYS Resulted








Physical Examination


HEENT: Pupils round and reactive to light; normocephalic; atraumatic; no 

jaundice.  Throat is clear.


NECK: Neck is supple, no JVD, no lymphadenopathy.


CHEST:  Chest is clear to auscultation and percussion.


CARDIAC:  Regular rate and rhythm with no murmur gallop or rubs.


ABDOMEN:  Soft, nondistended, nontender; no hepatosplenomegaly; bowel sounds 

are present in all four quadrants.


EXTREMITIES: No clubbing, cyanosis, or edema.


SKIN:  Normal; no rash; no jaundice.


CNS:  No focal deficits; alert and oriented times three.





Assessment and Plan


Plan


ASSESSMENT:


- Elevated LFTs with Chronic Hepatitis B infection.  Pt was hospitalized and 

found to have acute hepatitis B in June of 2016.  


    Workup at that time revealed:  Liver US (6/23/16) revealed 1. Mild 

hepatosplenomegaly and echogenic liver characteristic 


    of hepatic steatosis or underlying hepatocellular disease.  2. Small amount 

of sludge in the gallbladder.  


    Hepatitis profile (6/9/16)---> Hepatitis B surface antigen positive, 

hepatitis B core IgM antibodies-indeterminate high, Hepatitis B 


    DNA > 170,000,000.  Hepatitis Be Ag positive.  Of note, he was found to 

have antibodies for HCV at that time, but an undetectable 


    viral load/genotype. He was also checked for hepatitis Delta (7/20/16) and 

this was negative. Rpt labs during a Sept/October 2016


    hospitalization revealed  Hepatitis B surface Ag positive, Hepatitis C 

antibodies (+), Hepatitis Be Antibody nonreactive, Hepatitis Be 


    antigen reactive, Hepatitis B DNA > 170,000,000.  HCV viral load was 

undetectable.  Pt now hospitalized for bacteremia with 


    Coag neg staph bacteremia, SCOTTY pending, and GI consulted for worsening LFTs

, hepatitis B.  He continues to drink on a daily basis.


    He has not followed up with us in the office as instructed.  LFTs T. Bili 

0.6, , , Alk Phosph 172.  Hepatitis Viral load, 


    DNA PCR, Hepatitis C viral load pending.  Will await these labs and order a 

Hepatitis Delta.  It is unclear if his his LFT derangement


    is related to his chronic hepatitis B or infection.  Helistent to start tx 

on a patient that has not followed up in office.  


- Bacteremia with Coag. negative staph.  ID following.  Cardiology following 

for SCOTTY in am.  Vanco per ID


- Hx mitral valve endocarditis.  States no IVDA since his discharge in November of 2016.  Follows at methadone clinic.  


- Anemia.  HH 11.3/33.8. EGD (10/6/16)----> erosive gastritis. Pathology with 

non-specific mild chronic inflammation, no helicobacter pylori-like


   organisms are present.  


- Hepatitis C Ab (+), undetectable viral load x 2. 





PLAN:


- ANGIE


- RUQ US


- Hepatitis Delta Ab


- Await Hepatitis Viral load,  DNA Viral load DNA PCR, Hepatitis C viral load 

pending. 


- Monitor LFTs


- Abx per ID


- Supportive care


- Further recommendations to follow based on results of above


- Pt seen and examined by Dr. Poole and myself and this note is written on 

his behalf











Talia Shi Sep 5, 2017 16:21

## 2017-09-05 NOTE — HHI.IDPN
Subjective


Subjective


Remarks


afebrile, no c/o


1 + blood clx


2 D echo with small mobile MV veg'n


Othe clx : final , no growth except for 1 bottle


SCOTTY scheduled for am


Antibiotics


vancomycin


Allergies:  


Coded Allergies:  


     *MDRO Multi-Drug Resistant Organism (Verified  Adverse Reaction, Unknown, 8 /27/17)


 MRSA (sputum & blood) - 6/25/13


 MRSA (wound) - 9/29/08


 **MRSA PCR Screen POSITIVE - 6/28/2016**





Objective


.





Vital Signs








  Date Time  Temp Pulse Resp B/P (MAP) Pulse Ox O2 Delivery O2 Flow Rate FiO2


 


9/5/17 11:44 97.8 57 16 131/88 (102) 98   


 


9/5/17 09:51     97   21


 


9/5/17 08:13 98.2 58 18 110/76 (87) 97   


 


9/5/17 03:39 97.6 69 18 129/76 (93) 97   


 


9/4/17 23:40 97.8 64 19 122/81 (95) 98   


 


9/4/17 21:27        21


 


9/4/17 20:47 97.6 54 17 113/69 (84) 98   


 


9/4/17 16:41 97.9 60 20 107/75 (86) 97   














 9/5/17 9/5/17 9/6/17





 15:00 23:00 07:00


 


Intake Total 480 ml  


 


Balance 480 ml  


 


   


 


Intake Oral 480 ml  


 


# Voids 3  


 


# Bowel Movements 1  








.





Laboratory Tests








Test


  9/4/17


06:13 9/5/17


10:44


 


White Blood Count 2.4 TH/MM3  2.1 TH/MM3 


 


Red Blood Count 3.67 MIL/MM3  3.65 MIL/MM3 


 


Hemoglobin 11.2 GM/DL  11.3 GM/DL 


 


Hematocrit 34.0 %  33.8 % 


 


Mean Corpuscular Volume 92.7 FL  92.5 FL 


 


Mean Corpuscular Hemoglobin 30.5 PG  30.9 PG 


 


Mean Corpuscular Hemoglobin


Concent 32.9 % 


  33.4 % 


 


 


Red Cell Distribution Width 14.0 %  13.9 % 


 


Platelet Count 79 TH/MM3  78 TH/MM3 


 


Mean Platelet Volume 9.1 FL  9.7 FL 


 


Neutrophils (%) (Auto)  47.8 % 


 


Lymphocytes (%) (Auto)  43.8 % 


 


Monocytes (%) (Auto)  5.6 % 


 


Eosinophils (%) (Auto)  2.1 % 


 


Basophils (%) (Auto)  0.7 % 


 


Neutrophils # (Auto)  1.0 TH/MM3 


 


Lymphocytes # (Auto)  0.9 TH/MM3 


 


Monocytes # (Auto)  0.1 TH/MM3 


 


Eosinophils # (Auto)  0.0 TH/MM3 


 


Basophils # (Auto)  0.0 TH/MM3 


 


CBC Comment  AUTO DIFF 


 


Differential Comment


  


  AUTO DIFF


CONFIRMED


 


Platelet Estimate  LOW 


 


Platelet Morphology Comment  NORMAL 


 


Red Cell Morphology Comment  NORMAL 








Laboratory Tests








Test


  9/4/17


06:13 9/5/17


10:44


 


Blood Urea Nitrogen 11 MG/DL  11 MG/DL 


 


Creatinine 0.86 MG/DL  0.89 MG/DL 


 


Random Glucose 69 MG/DL  87 MG/DL 


 


Total Protein 5.9 GM/DL  6.0 GM/DL 


 


Albumin 2.6 GM/DL  2.4 GM/DL 


 


Calcium Level 8.4 MG/DL  7.9 MG/DL 


 


Alkaline Phosphatase 166 U/L  172 U/L 


 


Aspartate Amino Transf


(AST/SGOT) 142 U/L 


  183 U/L 


 


 


Alanine Aminotransferase


(ALT/SGPT) 133 U/L 


  148 U/L 


 


 


Total Bilirubin 0.5 MG/DL  0.6 MG/DL 


 


Sodium Level 139 MEQ/L  136 MEQ/L 


 


Potassium Level 4.2 MEQ/L  4.3 MEQ/L 


 


Chloride Level 108 MEQ/L  106 MEQ/L 


 


Carbon Dioxide Level 26.5 MEQ/L  28.1 MEQ/L 


 


Anion Gap 5 MEQ/L  2 MEQ/L 


 


Estimat Glomerular Filtration


Rate 101 ML/MIN 


  97 ML/MIN 


 








Microbiology








 Date/Time


Source Procedure


Growth Status


 


 


 9/3/17 09:00


Blood Peripheral Aerobic Blood Culture - Preliminary


Staph Sp Coagulase Negative Resulted


 


 9/3/17 09:00


Blood Peripheral Anaerobic Blood Culture - Preliminary


NO GROWTH IN 2 DAYS Resulted





 9/3/17 08:45


Blood Peripheral Aerobic Blood Culture - Preliminary


NO GROWTH IN 2 DAYS Resulted


 


 9/3/17 08:45


Blood Peripheral Anaerobic Blood Culture - Preliminary


NO GROWTH IN 2 DAYS Resulted








Imaging





Last Impressions








Chest X-Ray 9/3/17 0715 Signed





Impressions: 





 Service Date/Time:  Abrahan, September 3, 2017 07:32 - CONCLUSION: No acute 





 disease.       Jesús Singer MD 








Physical Exam


CONSTITUTIONAL/GENERAL: This is a thin young male patient, in no apparent 

distress.


TUBES/LINES/DRAINS:


SKIN: No jaundice, rashes, or lesions.  Skin temperature appropriate. Not 

diaphoretic. 


 EYES: Pupils equal and round and reactive. Extraocular motions intact. No 

scleral icterus. No injection or drainage. Fundi not examined.


 CARDIOVASCULAR: Regular rate and rhythm + 2/6 systolic murmur, no  gallops, or 

rubs. No JVD. Peripheral pulses symmetric.


RESPIRATORY/CHEST: Symmetric, unlabored respirations. Clear to auscultation. 

Breath sounds equal bilaterally. No wheezes, rales, or rhonchi.  


GASTROINTESTINAL: Abdomen soft, non-tender, nondistended. No hepato-splenomegaly

, or palpable masses. No guarding. Bowel sounds present.


 MUSCULOSKELETAL: Extremities without clubbing, cyanosis, or edema. No joint 

tenderness or effusion noted. No calf tenderness. No mottling or clubbing.


 NEUROLOGICAL: Awake and alert. Motor and sensory grossly within normal limits. 

Follows commands. Cognitively sharp. Moves all extremities.


PSYCHIATRIC: Flat affect








Assessment & Plan


Remarks


IVDU


H/o mitral valve endocarditis


Coag negative staph bacteremia, cw contamination (1/4 bottles, 2 different 

morphologies)


   Another blood culture just became positive for coag neg staph, ID P


Hep C/hep B co-infection, nnot  on treatment and keeps drinking heavily  





   cont  vancomycin for now


   - fu ID/S on the coag neg staph isolate


   fu repeat blood clx


   SCOTTY  is scheduled for Destiny Fritz MD Sep 5, 2017 14:53

## 2017-09-06 VITALS
RESPIRATION RATE: 20 BRPM | OXYGEN SATURATION: 100 % | DIASTOLIC BLOOD PRESSURE: 91 MMHG | HEART RATE: 57 BPM | SYSTOLIC BLOOD PRESSURE: 129 MMHG | TEMPERATURE: 97.6 F

## 2017-09-06 VITALS
OXYGEN SATURATION: 97 % | HEART RATE: 60 BPM | SYSTOLIC BLOOD PRESSURE: 120 MMHG | TEMPERATURE: 97.9 F | RESPIRATION RATE: 17 BRPM | DIASTOLIC BLOOD PRESSURE: 84 MMHG

## 2017-09-06 VITALS
DIASTOLIC BLOOD PRESSURE: 79 MMHG | SYSTOLIC BLOOD PRESSURE: 130 MMHG | RESPIRATION RATE: 20 BRPM | HEART RATE: 55 BPM | TEMPERATURE: 97.7 F | OXYGEN SATURATION: 98 %

## 2017-09-06 VITALS
RESPIRATION RATE: 20 BRPM | OXYGEN SATURATION: 97 % | HEART RATE: 66 BPM | SYSTOLIC BLOOD PRESSURE: 112 MMHG | TEMPERATURE: 97.8 F | DIASTOLIC BLOOD PRESSURE: 81 MMHG

## 2017-09-06 VITALS
DIASTOLIC BLOOD PRESSURE: 88 MMHG | SYSTOLIC BLOOD PRESSURE: 118 MMHG | HEART RATE: 59 BPM | RESPIRATION RATE: 17 BRPM | OXYGEN SATURATION: 98 % | TEMPERATURE: 98 F

## 2017-09-06 VITALS
OXYGEN SATURATION: 99 % | SYSTOLIC BLOOD PRESSURE: 119 MMHG | RESPIRATION RATE: 20 BRPM | DIASTOLIC BLOOD PRESSURE: 78 MMHG | HEART RATE: 62 BPM | TEMPERATURE: 97.7 F

## 2017-09-06 LAB
ALP SERPL-CCNC: 168 U/L (ref 45–117)
ALT SERPL-CCNC: 144 U/L (ref 12–78)
ANION GAP SERPL CALC-SCNC: 4 MEQ/L (ref 5–15)
AST SERPL-CCNC: 177 U/L (ref 15–37)
BASOPHILS # BLD AUTO: 0 TH/MM3 (ref 0–0.2)
BASOPHILS NFR BLD AUTO: 2 % (ref 0–2)
BASOPHILS NFR BLD: 1 % (ref 0–2)
BILIRUB SERPL-MCNC: 0.4 MG/DL (ref 0.2–1)
BUN SERPL-MCNC: 8 MG/DL (ref 7–18)
CHLORIDE SERPL-SCNC: 109 MEQ/L (ref 98–107)
EOSINOPHIL # BLD: 0.1 TH/MM3 (ref 0–0.4)
EOSINOPHIL NFR BLD: 2.5 % (ref 0–4)
EOSINOPHIL NFR BLD: 3 % (ref 0–4)
ERYTHROCYTE [DISTWIDTH] IN BLOOD BY AUTOMATED COUNT: 13.7 % (ref 11.6–17.2)
GFR SERPLBLD BASED ON 1.73 SQ M-ARVRAT: 104 ML/MIN (ref 89–?)
HCO3 BLD-SCNC: 27.4 MEQ/L (ref 21–32)
HCT VFR BLD CALC: 32.8 % (ref 39–51)
HEMO FLAGS: (no result)
LYMPHOCYTES # BLD AUTO: 1 TH/MM3 (ref 1–4.8)
LYMPHOCYTES NFR BLD AUTO: 49.8 % (ref 9–44)
MCH RBC QN AUTO: 30.9 PG (ref 27–34)
MCHC RBC AUTO-ENTMCNC: 33.7 % (ref 32–36)
MCV RBC AUTO: 91.6 FL (ref 80–100)
MONOCYTES NFR BLD: 6.3 % (ref 0–8)
NEUTROPHILS # BLD AUTO: 0.8 TH/MM3 (ref 1.8–7.7)
NEUTROPHILS NFR BLD AUTO: 40.4 % (ref 16–70)
NEUTS BAND # BLD MANUAL: 0.9 TH/MM3 (ref 1.8–7.7)
NEUTS SEG NFR BLD MANUAL: 44 % (ref 16–70)
PLAT MORPH BLD: NORMAL
PLATELET # BLD: 74 TH/MM3 (ref 150–450)
PLATELET BLD QL SMEAR: (no result)
POTASSIUM SERPL-SCNC: 4.1 MEQ/L (ref 3.5–5.1)
RBC # BLD AUTO: 3.58 MIL/MM3 (ref 4.5–5.9)
RBC MORPH BLD: NORMAL
SCAN/DIFF: (no result)
SODIUM SERPL-SCNC: 140 MEQ/L (ref 136–145)
WBC # BLD AUTO: 2 TH/MM3 (ref 4–11)
WBC DIFF SAMPLE: 100

## 2017-09-06 RX ADMIN — STANDARDIZED SENNA CONCENTRATE AND DOCUSATE SODIUM SCH TAB: 8.6; 5 TABLET, FILM COATED ORAL at 10:20

## 2017-09-06 RX ADMIN — ENOXAPARIN SODIUM SCH MG: 40 INJECTION SUBCUTANEOUS at 10:23

## 2017-09-06 RX ADMIN — STANDARDIZED SENNA CONCENTRATE AND DOCUSATE SODIUM SCH TAB: 8.6; 5 TABLET, FILM COATED ORAL at 20:15

## 2017-09-06 RX ADMIN — CEFTRIAXONE SODIUM SCH MLS/HR: 2 INJECTION, POWDER, FOR SOLUTION INTRAMUSCULAR; INTRAVENOUS at 17:20

## 2017-09-06 RX ADMIN — VANCOMYCIN HYDROCHLORIDE SCH MLS/HR: 1 INJECTION, SOLUTION INTRAVENOUS at 16:12

## 2017-09-06 RX ADMIN — NICOTINE SCH PATCH: 7 PATCH, EXTENDED RELEASE TOPICAL at 10:20

## 2017-09-06 RX ADMIN — PHENYTOIN SODIUM SCH MLS/HR: 50 INJECTION INTRAMUSCULAR; INTRAVENOUS at 00:06

## 2017-09-06 RX ADMIN — Medication SCH ML: at 09:00

## 2017-09-06 RX ADMIN — PHENYTOIN SODIUM SCH MLS/HR: 50 INJECTION INTRAMUSCULAR; INTRAVENOUS at 10:20

## 2017-09-06 RX ADMIN — METHADONE HYDROCHLORIDE SCH MG: 10 TABLET ORAL at 10:20

## 2017-09-06 RX ADMIN — Medication SCH ML: at 20:15

## 2017-09-06 RX ADMIN — VANCOMYCIN HYDROCHLORIDE SCH MLS/HR: 1 INJECTION, SOLUTION INTRAVENOUS at 08:00

## 2017-09-06 NOTE — ECHRPT
Indication:   endocarditis

 

 CONCLUSIONS

 The left ventricular systolic function is normal with an estimated ejection fraction in the range of
 55-60%. 

 

 Mild thickening of the mitral valve leaflets.  Mobile vegetation (0.9cm x 0.6cm) on the anterior isiah
flet 

 consistent with endocarditis.

 Moderate mitral valve regurgitation, which is really eccentric and anterior in nature. 

 Mild posterior mitral valve leaflet prolapse. 

 

 BP:        /         HR:                          Rhythm:           Sinus

 

                                                   Technical Quality:Good

 

 Medications

 Complications      None

 Proc. Components   Anesthesia at bedside for sedation

 FINDINGS

 

 LEFT VENTRICLE

 Normal left ventricular size. 

 The left ventricular systolic function is normal with an estimated ejection fraction in the range of
 55-60%. 

 No regional wall motion abnormalities are present. 

 

 RIGHT VENTRICLE

 Normal right ventricular size and systolic function. 

 

 LEFT ATRIUM

 The left atrial size is normal. 

 

 RIGHT ATRIUM

 The right atrial size is normal. 

 

 ATRIAL APPENDAGES

 Normal left atrial appendage size with no evidence of thrombus formation. 

 The velocities in the left atrial appendage are normal. 

 

 ATRIAL SEPTUM

 Normal atrial septal thickness without atrial level shunting by limited color doppler interrogation.
 

 No atrial level shunt is observed with agitated saline contrast administration. 

 

 AORTA

 Descending aorta normal in size

 

 MITRAL VALVE

 Mild thickening of the mitral valve leaflets.  Mobile vegetation (0.9cm x 0.6cm) on the anterior isiah
flet 

 consistent with endocarditis.

 Moderate mitral valve regurgitation, which is really eccentric and anterior in nature. 

 Mild posterior mitral valve leaflet prolapse. 

 

 AORTIC VALVE

 Trileaflet aortic valve. 

 No aortic valve regurgitation. 

 No aortic valve stenosis. 

 

 TRICUSPID VALVE

 Structurally normal tricuspid valve. There is trace tricuspid valve regurgitation. No tricuspid valv
e stenosis. 

 

 VESSELS

 

 

 

 

 The pulmonary valve is not well visualized. No pulmonary valve regurgitation. 

 

 PERICADIUM

 No pericardial effusion. 

 

 

 

 

  Giorgio Escalona DO

  (Electronically Signed)

  Final Date:06 September 2017 11:45

## 2017-09-06 NOTE — RADRPT
EXAM DATE/TIME:  09/06/2017 09:39 

 

[facility] COMPARISON:     

US ABDOMEN - LIVER, September 29, 2016, 19:26.

        

 

 

INDICATIONS :     

Increased lab values.

                     

 

MEDICAL HISTORY :     

Hepatitis C. Hepatitis B. Methicillin-resistant Staphylococcus aureus. Head trauma. Migraines. Substa
nce abuse. ETOH use. Neck pain.

 

SURGICAL HISTORY :          

Right leg ORIF. Craniotomy from trauma.

 

ENCOUNTER:     

Subsequent

 

ACUITY:     

2 days

 

PAIN SCORE:     

2/10

 

LOCATION:     

Bilateral upper quadrant 

                     

MEASUREMENTS:     

 

LIVER:     

16.0 cm length 

 

COMMON DUCT:     

3 mm

 

RIGHT KIDNEY:     

12.2 x 4.7 x 7.0 cm

 

SPLEEN:     

16.1 cm length

 

FINDINGS:     

 

LIVER:     

Normal echotexture without focal lesion or ductal dilatation.  

 

COMMON DUCT:     

No intraluminal mass or stone visualized.  

 

GALLBLADDER:     

Contains no stones, demonstrates no wall thickening or pericholecystic fluid. Gallbladder is mostly d
ecompressed 

 

PANCREAS:     

The visualized portions are within normal limits.  

 

RIGHT KIDNEY:     

No hydronephrosis, stone or mass.  

 

SPLEEN:     

No focal lesion.  

 

CONCLUSION:     

1. Small amount of ascites at the hepatic dome.

2. Stable splenomegaly.

 

 

 

 Aidan Mckeon MD on September 06, 2017 at 16:28           

Board Certified Radiologist.

 This report was verified electronically.

## 2017-09-06 NOTE — PD.CARD.PN
Subjective


Subjective Remarks


Doing well post-SCOTTY





SCOTTY with vegetation noted on the anterior leaflet of the mitral valve, probable 

posterior MV prolapse and moderate eccentric MR





Objective


Medications





Current Medications








 Medications


  (Trade)  Dose


 Ordered  Sig/Eduardo


 Route  Start Time


 Stop Time Status Last Admin


 


  (Proair Hfa Inh)  2 puff  Q4H  PRN


 INH  9/3/17 09:00


     


 


 


  (Dolophine)  60 mg  DAILY


 PO  9/3/17 09:00


    9/6/17 10:20


 


 


 Sodium Chloride  1,000 ml @ 


 125 mls/hr  Q8H


 IV  9/3/17 08:56


    9/6/17 10:20


 


 


  (NS Flush)  2 ml  UNSCH  PRN


 IV FLUSH  9/3/17 09:00


     


 


 


  (NS Flush)  2 ml  BID


 IV FLUSH  9/3/17 09:00


    9/5/17 20:51


 


 


  (Tylenol)  650 mg  Q4H  PRN


 PO  9/3/17 09:00


     


 


 


  (Zofran Inj)  4 mg  Q6H  PRN


 IVP  9/3/17 10:00


     


 


 


  (Restoril)  15 mg  HS  PRN


 PO  9/3/17 21:00


    9/5/17 23:52


 


 


  (Lovenox Inj)  40 mg  Q24H


 SQ  9/3/17 10:00


    9/6/17 10:23


 


 


  (Nadya-Colace)  1 tab  BID


 PO  9/3/17 09:00


    9/6/17 10:20


 


 


  (Habitrol 7 Mg


 Patch.24 Hr)  1 patch  DAILY


 T-DERMAL  9/3/17 09:15


    9/6/17 10:20


 


 


 Miscellaneous


 Information  1  HS


 T-DERMAL  9/3/17 21:00


    9/5/17 21:00


 


 


 Pharmacy Profile


 Note  0 ml @ 0


 mls/hr  UNSCH


 OTHER  9/3/17 09:15


     


 


 


 Vancomycin HCl


 1000 mg/Sodium


 Chloride  250 ml @ 


 250 mls/hr  Q8H


 IV  9/3/17 16:00


    9/5/17 23:49


 


 


 Lactated Ringer's  1,000 ml @ 


 30 mls/hr  Q24H PRN


 IV  9/6/17 08:00


 9/9/17 07:59   


 


 


 Sodium Chloride  500 ml @ 


 30 mls/hr  [id number] PRN


 IV  9/6/17 08:00


 9/9/17 07:59   


 


 


  (Lopressor)  25 mg  ON CALL  PRN


 PO  9/6/17 08:00


 9/9/17 07:59   


 


 


  (Betadine 5%


 Antisepsis Kit)  1 applic  ON CALL  PRN


 EACH NARE  9/6/17 08:00


 9/9/17 07:59   


 


 


  (Chlorhexidine


 2% Cloth)  3 pack  ON CALL  PRN


 TOPICAL  9/6/17 08:00


 9/9/17 07:59   


 


 


  (NovoLIN R INJ)  See


 Protocol


 Table ...  ON CALL  PRN


 SQ  9/6/17 08:00


 9/9/17 07:59   


 


 


 Miscellaneous


 Information  SPECIFIC LAB TO BE


 DRAWN:VANCOMYCIN


 TROUGH DATE TO...  ONCE  ONCE


 .XX  9/8/17 07:45


 9/8/17 07:46   


 








Vital Signs / I&O





Vital Signs








  Date Time  Temp Pulse Resp B/P (MAP) Pulse Ox O2 Delivery O2 Flow Rate FiO2


 


9/6/17 08:05 97.7 55 20 130/79 (96) 98   


 


9/6/17 05:30 98.0 59 17 118/88 (98) 98   


 


9/6/17 00:00 97.9 60 17 120/84 (96) 97   


 


9/5/17 20:40 98.0 59 16 121/81 (94) 98   


 


9/5/17 16:04 97.9 61 16 110/64 (79) 98   


 


9/5/17 11:44 97.8 57 16 131/88 (102) 98   














I/O      


 


 9/5/17 9/5/17 9/5/17 9/6/17 9/6/17 9/6/17





 07:00 15:00 23:00 07:00 15:00 23:00


 


Intake Total 1250 ml 480 ml 1000 ml 800 ml  


 


Balance 1250 ml 480 ml 1000 ml 800 ml  


 


      


 


Intake Oral  480 ml 1000 ml 800 ml  


 


IV Total 1250 ml     


 


# Voids  3 1 3  


 


# Bowel Movements  1 0 0  








Physical Exam


GENERAL: NAD, AAOx3


SKIN: Warm and dry.


HEAD: Atraumatic. Normocephalic. 


EYES: Pupils equal and round. No scleral icterus. No injection or drainage. 


ENT: No nasal bleeding or discharge.  Mucous membranes pink and moist.


NECK: Trachea midline. No JVD. 


CARDIOVASCULAR: Regular rate and rhythm.  Holosystolic murmur at the apex.


RESPIRATORY: No accessory muscle use. Clear to auscultation. Breath sounds 

equal bilaterally. 


GASTROINTESTINAL: Abdomen soft, non-tender, nondistended. Hepatic and splenic 

margins not palpable. 


MUSCULOSKELETAL: Extremities without clubbing, cyanosis, or edema. No obvious 

deformities. 


NEUROLOGICAL: Awake and alert. No obvious cranial nerve deficits.  Motor 

grossly within normal limits. Five out of 5 muscle strength in the arms and 

legs.  Normal speech.


PSYCHIATRIC: Appropriate mood and affect; insight and judgment normal.


Laboratory





Laboratory Tests








Test


  9/6/17


07:34 9/6/17


07:50


 


White Blood Count 2.0 TH/MM3  


 


Red Blood Count 3.58 MIL/MM3  


 


Hemoglobin 11.0 GM/DL  


 


Hematocrit 32.8 %  


 


Mean Corpuscular Volume 91.6 FL  


 


Mean Corpuscular Hemoglobin 30.9 PG  


 


Mean Corpuscular Hemoglobin


Concent 33.7 % 


  


 


 


Red Cell Distribution Width 13.7 %  


 


Platelet Count 74 TH/MM3  


 


Mean Platelet Volume 8.9 FL  


 


Neutrophils (%) (Auto) 40.4 %  


 


Lymphocytes (%) (Auto) 49.8 %  


 


Monocytes (%) (Auto) 6.3 %  


 


Eosinophils (%) (Auto) 2.5 %  


 


Basophils (%) (Auto) 1.0 %  


 


Neutrophils # (Auto) 0.8 TH/MM3  


 


Lymphocytes # (Auto) 1.0 TH/MM3  


 


Monocytes # (Auto) 0.1 TH/MM3  


 


Eosinophils # (Auto) 0.1 TH/MM3  


 


Basophils # (Auto) 0.0 TH/MM3  


 


CBC Comment AUTO DIFF  


 


Differential Total Cells


Counted 100 


  


 


 


Neutrophils % (Manual) 44 %  


 


Lymphocytes % 46 %  


 


Monocytes % 5 %  


 


Eosinophils % 3 %  


 


Basophils % 2 %  


 


Neutrophils # (Manual) 0.9 TH/MM3  


 


Differential Comment


  FINAL DIFF


MANUAL 


 


 


Platelet Estimate LOW  


 


Platelet Morphology Comment NORMAL  


 


Red Cell Morphology Comment NORMAL  


 


Blood Urea Nitrogen 8 MG/DL  


 


Creatinine 0.84 MG/DL  


 


Random Glucose 87 MG/DL  


 


Total Protein 5.6 GM/DL  


 


Albumin 2.4 GM/DL  


 


Calcium Level 8.0 MG/DL  


 


Alkaline Phosphatase 168 U/L  


 


Aspartate Amino Transf


(AST/SGOT) 177 U/L 


  


 


 


Alanine Aminotransferase


(ALT/SGPT) 144 U/L 


  


 


 


Total Bilirubin 0.4 MG/DL  


 


Sodium Level 140 MEQ/L  


 


Potassium Level 4.1 MEQ/L  


 


Chloride Level 109 MEQ/L  


 


Carbon Dioxide Level 27.4 MEQ/L  


 


Anion Gap 4 MEQ/L  


 


Estimat Glomerular Filtration


Rate 104 ML/MIN 


  


 


 


Vancomycin Level Trough  17.3 MCG/ML 











Assessment and Plan


Problem List:  


(1) Bacteremia


ICD Codes:  R78.81 - Bacteremia


Status:  Acute


(2) Endocarditis, suspected


ICD Codes:  Z03.89 - Encounter for observation for other suspected diseases and 

conditions ruled out


(3) Hepatitis C


ICD Codes:  B19.20 - Unspecified viral hepatitis C without hepatic coma


Status:  Acute


(4) Hepatitis B


ICD Codes:  B19.10 - Unspecified viral hepatitis B without hepatic coma


Status:  Acute


(5) Tobacco abuse


ICD Codes:  Z72.0 - Tobacco use


(6) History of drug abuse


ICD Codes:  Z87.898 - Personal history of other specified conditions


Assessment and Plan


1) Questionable bacteremia in 1 bottle, previous ER visit with 1 bottle positive


2) Appears to have endocarditis of the anterior leaflet of the mitral valve


   Overall appears mobile and non-calcified, more than likely more acute in 

nature (vs. chronic from previous episode)


   Con't with antibiotics per ID's recommendations


3) Mild posterior leaflet prolapse with moderate eccentric anterior MR


   Does not appear to need CT surgery from a vegetation or prolapse standpoint


   Overall asymptomatic from MR standpoint


   Con't antibiotics


4) Will see PRN, call with questions





Problem Qualifiers





(1) Hepatitis B:  


Qualified Codes:  B18.1 - Chronic viral hepatitis B without delta-agent








Giorgio Escalona DO Sep 6, 2017 11:29

## 2017-09-06 NOTE — HHI.GIFU
Subjective


Remarks


Resting in bed.  No complaints.  Had SCOTTY, confirmed endocarditis.  Pt wants to 

go home with PICC line, outpatient antibiotics.  Serology labs still pending.





Objective


Vitals I&O





Vital Signs








  Date Time  Temp Pulse Resp B/P (MAP) Pulse Ox O2 Delivery O2 Flow Rate FiO2


 


9/6/17 16:28 97.8 66 20 112/81 (91) 97   


 


9/6/17 12:10 97.7 62 20 119/78 (92) 99   


 


9/6/17 08:05 97.7 55 20 130/79 (96) 98   


 


9/6/17 05:30 98.0 59 17 118/88 (98) 98   


 


9/6/17 00:00 97.9 60 17 120/84 (96) 97   


 


9/5/17 20:40 98.0 59 16 121/81 (94) 98   














I/O      


 


 9/5/17 9/5/17 9/5/17 9/6/17 9/6/17 9/6/17





 07:00 15:00 23:00 07:00 15:00 23:00


 


Intake Total 1250 ml 480 ml 1000 ml 800 ml 360 ml 


 


Balance 1250 ml 480 ml 1000 ml 800 ml 360 ml 


 


      


 


Intake Oral  480 ml 1000 ml 800 ml 360 ml 


 


IV Total 1250 ml     


 


# Voids  3 1 3 3 


 


# Bowel Movements  1 0 0 1 








Laboratory





Laboratory Tests








Test


  9/6/17


07:34 9/6/17


07:50


 


White Blood Count 2.0  


 


Red Blood Count 3.58  


 


Hemoglobin 11.0  


 


Hematocrit 32.8  


 


Mean Corpuscular Volume 91.6  


 


Mean Corpuscular Hemoglobin 30.9  


 


Mean Corpuscular Hemoglobin


Concent 33.7 


  


 


 


Red Cell Distribution Width 13.7  


 


Platelet Count 74  


 


Mean Platelet Volume 8.9  


 


Neutrophils (%) (Auto) 40.4  


 


Lymphocytes (%) (Auto) 49.8  


 


Monocytes (%) (Auto) 6.3  


 


Eosinophils (%) (Auto) 2.5  


 


Basophils (%) (Auto) 1.0  


 


Neutrophils # (Auto) 0.8  


 


Lymphocytes # (Auto) 1.0  


 


Monocytes # (Auto) 0.1  


 


Eosinophils # (Auto) 0.1  


 


Basophils # (Auto) 0.0  


 


CBC Comment AUTO DIFF  


 


Differential Total Cells


Counted 100 


  


 


 


Neutrophils % (Manual) 44  


 


Lymphocytes % 46  


 


Monocytes % 5  


 


Eosinophils % 3  


 


Basophils % 2  


 


Neutrophils # (Manual) 0.9  


 


Differential Comment


  FINAL DIFF


MANUAL 


 


 


Platelet Estimate LOW  


 


Platelet Morphology Comment NORMAL  


 


Red Cell Morphology Comment NORMAL  


 


Blood Urea Nitrogen 8  


 


Creatinine 0.84  


 


Random Glucose 87  


 


Total Protein 5.6  


 


Albumin 2.4  


 


Calcium Level 8.0  


 


Alkaline Phosphatase 168  


 


Aspartate Amino Transf


(AST/SGOT) 177 


  


 


 


Alanine Aminotransferase


(ALT/SGPT) 144 


  


 


 


Total Bilirubin 0.4  


 


Sodium Level 140  


 


Potassium Level 4.1  


 


Chloride Level 109  


 


Carbon Dioxide Level 27.4  


 


Anion Gap 4  


 


Estimat Glomerular Filtration


Rate 104 


  


 


 


Vancomycin Level Trough  17.3 














 Date/Time


Source Procedure


Growth Status


 


 


 9/3/17 09:00


Blood Peripheral Aerobic Blood Culture - Final


Staphylococcus Hominis-Hominis Resulted


 


 9/3/17 09:00


Blood Peripheral Anaerobic Blood Culture - Preliminary


NO GROWTH IN 3 DAYS Resulted








Imaging





Last Impressions








Liver Ultrasound 9/6/17 0000 Signed





Impressions: 





 Service Date/Time:  Wednesday, September 6, 2017 09:39 - CONCLUSION:  1. Small 





 amount of ascites at the hepatic dome. 2. Stable splenomegaly.     Aidan Mckeon MD 


 


Chest X-Ray 9/3/17 0715 Signed





Impressions: 





 Service Date/Time:  Abrahan, September 3, 2017 07:32 - CONCLUSION: No acute 





 disease.       Jesús Singer MD 








Physical Exam


HEENT: Normocephalic; atraumatic; no jaundice.  


CHEST:  CTA


CARDIAC:  RRR


ABDOMEN:  Soft, nondistended, nontender; no hepatosplenomegaly; bowel sounds 

are present in all four quadrants.


EXTREMITIES: No clubbing, cyanosis, or edema.


SKIN:  Normal; no rash; no jaundice.


CNS:  No focal deficits; alert and oriented times three.





Assessment and Plan


Plan


ASSESSMENT:


- Elevated LFTs with Chronic Hepatitis B infection.  Pt was hospitalized and 

found to have acute hepatitis B in June of 2016.  


    Workup at that time revealed:  Liver US (6/23/16) revealed 1. Mild 

hepatosplenomegaly and echogenic liver characteristic 


    of hepatic steatosis or underlying hepatocellular disease.  2. Small amount 

of sludge in the gallbladder.  


    Hepatitis profile (6/9/16)---> Hepatitis B surface antigen positive, 

hepatitis B core IgM antibodies-indeterminate high, Hepatitis B 


    DNA > 170,000,000.  Hepatitis Be Ag positive.  Of note, he was found to 

have antibodies for HCV at that time, but an undetectable 


    viral load/genotype. He was also checked for hepatitis Delta (7/20/16) and 

this was negative. Rpt labs during a Sept/October 2016


    hospitalization revealed  Hepatitis B surface Ag positive, Hepatitis C 

antibodies (+), Hepatitis Be Antibody nonreactive, Hepatitis Be 


    antigen reactive, Hepatitis B DNA > 170,000,000.  HCV viral load was 

undetectable.  Pt now hospitalized for bacteremia with 


    Coag neg staph bacteremia, SCOTTY pending, and GI consulted for worsening LFTs

, hepatitis B.  He continues to drink on a daily basis.


    He has not followed up with us in the office as instructed.  LFTs T. Bili 

0.6, , , Alk Phosph 172.  Hepatitis Viral load, 


    DNA PCR, Hepatitis C viral load pending, Hepatitis Delta pending.  It is 

unclear if his his LFT derangement is related to his chronic 


    hepatitis B or infection.  Will await rpt. serology before making decision 

regarding tx for hepatitis b.  Pt needs complete etoh cessation-


    d/w patient.  Pt did not previously have active chronic hcv infection- 

viral load repeated, suspect that either he has a false positive for 


    HCV antibodies or that he cleared this on his own.  If viral load still 

undetectable, no need for HCV tx (and he would not be a candidate


    for treatment if it is positive until he has been off of ETOH/Drugs x 6 

months.  OF note liver us (9/6/17)---> small amount of ascites


    at the hepatic dome, stable splenomegaly.


- Bacteremia with Coag. negative staph.  ID following.  S/P SCOTTY with vegetation 

noted on the anterior leaflet of the mitral valve, probable 


   posterior MV prolapse and moderate eccentric MR Vanco per ID.  Plan for PICC 

line with outpatient abx per patient/id


- Hx mitral valve endocarditis.  States no IVDA since his discharge in November of 2016.  Follows at methadone clinic.  


- Anemia.  HH 11.0/32.8 EGD (10/6/16)----> erosive gastritis. Pathology with non

-specific mild chronic inflammation, no helicobacter pylori-like


   organisms are present.  


- Hepatitis C Ab (+), undetectable viral load x 2. Pt has repeat viral load 

pending.





PLAN:


- ANGIE


- Await Hepatitis Delta Ab, Hepatitis Viral load,  DNA Viral load DNA PCR, 

Hepatitis C viral load pending. 


- Monitor LFTs


- Abx per ID


- Supportive care


- Further recommendations with regards to treatment for HBV to be determined 

after serology resulted


- Complete ETOH cessation- d/w patient


- Pt seen and examined by Dr. Poole and myself and this note is written on 

his behalf











Talia Shi Sep 6, 2017 18:21

## 2017-09-06 NOTE — HHI.IDPN
Subjective


Subjective


Remarks


afebrile, no c/o


1 + blood clx for Staph hominis 


SCOTTY with small mobile MV vegetation : acute vs soubacute, not chronic 


Othe clx : final , no growth except for 1 bottle


Antibiotics


vancomycin


Allergies:  


Coded Allergies:  


     *MDRO Multi-Drug Resistant Organism (Verified  Adverse Reaction, Unknown, 8 /27/17)


 MRSA (sputum & blood) - 6/25/13


 MRSA (wound) - 9/29/08


 **MRSA PCR Screen POSITIVE - 6/28/2016**





Objective


.





Vital Signs








  Date Time  Temp Pulse Resp B/P (MAP) Pulse Ox O2 Delivery O2 Flow Rate FiO2


 


9/6/17 16:28 97.8 66 20 112/81 (91) 97   


 


9/6/17 12:10 97.7 62 20 119/78 (92) 99   


 


9/6/17 08:05 97.7 55 20 130/79 (96) 98   


 


9/6/17 05:30 98.0 59 17 118/88 (98) 98   


 


9/6/17 00:00 97.9 60 17 120/84 (96) 97   


 


9/5/17 20:40 98.0 59 16 121/81 (94) 98   














 9/6/17 9/6/17 9/7/17





 15:00 23:00 07:00


 


Intake Total 360 ml  


 


Balance 360 ml  


 


   


 


Intake Oral 360 ml  


 


# Voids 3  


 


# Bowel Movements 1  








.





Laboratory Tests








Test


  9/5/17


10:44 9/6/17


07:34


 


White Blood Count 2.1 TH/MM3  2.0 TH/MM3 


 


Red Blood Count 3.65 MIL/MM3  3.58 MIL/MM3 


 


Hemoglobin 11.3 GM/DL  11.0 GM/DL 


 


Hematocrit 33.8 %  32.8 % 


 


Mean Corpuscular Volume 92.5 FL  91.6 FL 


 


Mean Corpuscular Hemoglobin 30.9 PG  30.9 PG 


 


Mean Corpuscular Hemoglobin


Concent 33.4 % 


  33.7 % 


 


 


Red Cell Distribution Width 13.9 %  13.7 % 


 


Platelet Count 78 TH/MM3  74 TH/MM3 


 


Mean Platelet Volume 9.7 FL  8.9 FL 


 


Neutrophils (%) (Auto) 47.8 %  40.4 % 


 


Lymphocytes (%) (Auto) 43.8 %  49.8 % 


 


Monocytes (%) (Auto) 5.6 %  6.3 % 


 


Eosinophils (%) (Auto) 2.1 %  2.5 % 


 


Basophils (%) (Auto) 0.7 %  1.0 % 


 


Neutrophils # (Auto) 1.0 TH/MM3  0.8 TH/MM3 


 


Lymphocytes # (Auto) 0.9 TH/MM3  1.0 TH/MM3 


 


Monocytes # (Auto) 0.1 TH/MM3  0.1 TH/MM3 


 


Eosinophils # (Auto) 0.0 TH/MM3  0.1 TH/MM3 


 


Basophils # (Auto) 0.0 TH/MM3  0.0 TH/MM3 


 


CBC Comment AUTO DIFF  AUTO DIFF 


 


Differential Comment


  AUTO DIFF


CONFIRMED FINAL DIFF


MANUAL


 


Platelet Estimate LOW  LOW 


 


Platelet Morphology Comment NORMAL  NORMAL 


 


Red Cell Morphology Comment NORMAL  NORMAL 


 


Differential Total Cells


Counted 


  100 


 


 


Neutrophils % (Manual)  44 % 


 


Lymphocytes %  46 % 


 


Monocytes %  5 % 


 


Eosinophils %  3 % 


 


Basophils %  2 % 


 


Neutrophils # (Manual)  0.9 TH/MM3 








Laboratory Tests








Test


  9/5/17


10:44 9/6/17


07:34


 


Blood Urea Nitrogen 11 MG/DL  8 MG/DL 


 


Creatinine 0.89 MG/DL  0.84 MG/DL 


 


Random Glucose 87 MG/DL  87 MG/DL 


 


Total Protein 6.0 GM/DL  5.6 GM/DL 


 


Albumin 2.4 GM/DL  2.4 GM/DL 


 


Calcium Level 7.9 MG/DL  8.0 MG/DL 


 


Alkaline Phosphatase 172 U/L  168 U/L 


 


Aspartate Amino Transf


(AST/SGOT) 183 U/L 


  177 U/L 


 


 


Alanine Aminotransferase


(ALT/SGPT) 148 U/L 


  144 U/L 


 


 


Total Bilirubin 0.6 MG/DL  0.4 MG/DL 


 


Sodium Level 136 MEQ/L  140 MEQ/L 


 


Potassium Level 4.3 MEQ/L  4.1 MEQ/L 


 


Chloride Level 106 MEQ/L  109 MEQ/L 


 


Carbon Dioxide Level 28.1 MEQ/L  27.4 MEQ/L 


 


Anion Gap 2 MEQ/L  4 MEQ/L 


 


Estimat Glomerular Filtration


Rate 97 ML/MIN 


  104 ML/MIN 


 








Imaging





Last Impressions








Chest X-Ray 9/3/17 0715 Signed





Impressions: 





 Service Date/Time:  Abrahan, September 3, 2017 07:32 - CONCLUSION: No acute 





 disease.       Jesús Singer MD 








Physical Exam


CONSTITUTIONAL/GENERAL: This is a thin young male patient, in no apparent 

distress.


TUBES/LINES/DRAINS:


SKIN: No jaundice, rashes, or lesions.  Skin temperature appropriate. Not 

diaphoretic. 


 EYES: Pupils equal and round and reactive. Extraocular motions intact. No 

scleral icterus. No injection or drainage. Fundi not examined.


 CARDIOVASCULAR: Regular rate and rhythm + 2/6 systolic murmur, no  gallops, or 

rubs. No JVD. Peripheral pulses symmetric.


RESPIRATORY/CHEST: Symmetric, unlabored respirations. Clear to auscultation. 

Breath sounds equal bilaterally. No wheezes, rales, or rhonchi.  


GASTROINTESTINAL: Abdomen soft, non-tender, nondistended. No hepato-splenomegaly

, or palpable masses. No guarding. Bowel sounds present.


 MUSCULOSKELETAL: Extremities without clubbing, cyanosis, or edema. No joint 

tenderness or effusion noted. No calf tenderness. No mottling or clubbing.


 NEUROLOGICAL: Awake and alert. Motor and sensory grossly within normal limits. 

Follows commands. Cognitively sharp. Moves all extremities.


PSYCHIATRIC: normal affect








Assessment & Plan


Remarks


IVDU


New apisoede of mitral valve endocarditis, Staph hominis 


   ? true culprit organois 


   S CFTX


Coag negative staph bacteremia, cw contamination (1/4 bottles, 2 different 

morphologies)


   Another blood culture just became positive for coag neg staph, ID P


Hep C/hep B co-infection, nnot  on treatment and keeps drinking heavily  





   dc vancomycin


   start CFTX


   will Rx with 4-6 weeks of abx (CFTX)


   PICC line


repeat BC





Leticia Gray case mngr











Destiny Miller MD Sep 6, 2017 16:59

## 2017-09-07 VITALS
SYSTOLIC BLOOD PRESSURE: 123 MMHG | HEART RATE: 55 BPM | OXYGEN SATURATION: 97 % | TEMPERATURE: 97.8 F | RESPIRATION RATE: 20 BRPM | DIASTOLIC BLOOD PRESSURE: 79 MMHG

## 2017-09-07 VITALS
OXYGEN SATURATION: 98 % | TEMPERATURE: 97.6 F | SYSTOLIC BLOOD PRESSURE: 124 MMHG | DIASTOLIC BLOOD PRESSURE: 73 MMHG | RESPIRATION RATE: 20 BRPM | HEART RATE: 54 BPM

## 2017-09-07 VITALS
HEART RATE: 59 BPM | TEMPERATURE: 97.9 F | DIASTOLIC BLOOD PRESSURE: 73 MMHG | RESPIRATION RATE: 16 BRPM | OXYGEN SATURATION: 98 % | SYSTOLIC BLOOD PRESSURE: 123 MMHG

## 2017-09-07 VITALS
RESPIRATION RATE: 20 BRPM | OXYGEN SATURATION: 98 % | DIASTOLIC BLOOD PRESSURE: 83 MMHG | SYSTOLIC BLOOD PRESSURE: 131 MMHG | HEART RATE: 55 BPM | TEMPERATURE: 98.1 F

## 2017-09-07 VITALS
DIASTOLIC BLOOD PRESSURE: 71 MMHG | TEMPERATURE: 98.2 F | OXYGEN SATURATION: 97 % | HEART RATE: 63 BPM | RESPIRATION RATE: 16 BRPM | SYSTOLIC BLOOD PRESSURE: 121 MMHG

## 2017-09-07 LAB
ALP SERPL-CCNC: 182 U/L (ref 45–117)
ALT SERPL-CCNC: 155 U/L (ref 12–78)
ANION GAP SERPL CALC-SCNC: 5 MEQ/L (ref 5–15)
AST SERPL-CCNC: 185 U/L (ref 15–37)
BASOPHILS # BLD AUTO: 0 TH/MM3 (ref 0–0.2)
BASOPHILS NFR BLD: 0.8 % (ref 0–2)
BILIRUB SERPL-MCNC: 0.5 MG/DL (ref 0.2–1)
BUN SERPL-MCNC: 9 MG/DL (ref 7–18)
CHLORIDE SERPL-SCNC: 103 MEQ/L (ref 98–107)
EOSINOPHIL # BLD: 0.1 TH/MM3 (ref 0–0.4)
EOSINOPHIL NFR BLD: 2.9 % (ref 0–4)
ERYTHROCYTE [DISTWIDTH] IN BLOOD BY AUTOMATED COUNT: 14.2 % (ref 11.6–17.2)
GFR SERPLBLD BASED ON 1.73 SQ M-ARVRAT: 101 ML/MIN (ref 89–?)
HBV DNA SERPL NAA+PROBE-ACNC: (no result) IU/ML (ref 0–19)
HBV DNA SERPL NAA+PROBE-LOG IU: (no result) {LOG_IU}/ML (ref ?–1.3)
HCO3 BLD-SCNC: 28.9 MEQ/L (ref 21–32)
HCT VFR BLD CALC: 33.9 % (ref 39–51)
HCV RNA PCR LOGIU/ML: (no result) (ref 0–1.18)
HCV RNA SERPL NAA+PROBE-ACNC: (no result) IU/ML (ref 0–14)
HEMO FLAGS: (no result)
LYMPHOCYTES # BLD AUTO: 1.2 TH/MM3 (ref 1–4.8)
LYMPHOCYTES NFR BLD AUTO: 46.3 % (ref 9–44)
MCH RBC QN AUTO: 31.1 PG (ref 27–34)
MCHC RBC AUTO-ENTMCNC: 33.9 % (ref 32–36)
MCV RBC AUTO: 91.7 FL (ref 80–100)
MONOCYTES NFR BLD: 7.2 % (ref 0–8)
NEUTROPHILS # BLD AUTO: 1.1 TH/MM3 (ref 1.8–7.7)
NEUTROPHILS NFR BLD AUTO: 42.8 % (ref 16–70)
PLAT MORPH BLD: NORMAL
PLATELET # BLD: 77 TH/MM3 (ref 150–450)
PLATELET BLD QL SMEAR: (no result)
POTASSIUM SERPL-SCNC: 4.1 MEQ/L (ref 3.5–5.1)
RBC # BLD AUTO: 3.69 MIL/MM3 (ref 4.5–5.9)
SCAN/DIFF: (no result)
SODIUM SERPL-SCNC: 137 MEQ/L (ref 136–145)
WBC # BLD AUTO: 2.6 TH/MM3 (ref 4–11)

## 2017-09-07 RX ADMIN — Medication SCH ML: at 08:13

## 2017-09-07 RX ADMIN — METHADONE HYDROCHLORIDE SCH MG: 10 TABLET ORAL at 08:13

## 2017-09-07 RX ADMIN — NICOTINE SCH PATCH: 7 PATCH, EXTENDED RELEASE TOPICAL at 08:13

## 2017-09-07 RX ADMIN — STANDARDIZED SENNA CONCENTRATE AND DOCUSATE SODIUM SCH TAB: 8.6; 5 TABLET, FILM COATED ORAL at 08:12

## 2017-09-07 RX ADMIN — CEFTRIAXONE SODIUM SCH MLS/HR: 2 INJECTION, POWDER, FOR SOLUTION INTRAMUSCULAR; INTRAVENOUS at 17:42

## 2017-09-07 RX ADMIN — ENOXAPARIN SODIUM SCH MG: 40 INJECTION SUBCUTANEOUS at 08:13

## 2017-09-07 NOTE — RADRPT
EXAM DATE/TIME:  09/07/2017 17:06 

 

[facility] COMPARISON:     

CHEST SINGLE AP, September 03, 2017, 7:32.

 

                     

INDICATIONS :     

PICC line placement.

                     

 

MEDICAL HISTORY :            

Hepatitis C. Hepatitis B. Methicillin-resistant Staphylococcus aureus. Head trauma. Substance abuse. 
ETOH use. Neck pain.   

 

SURGICAL HISTORY :        

Right leg ORIF. Craniotomy from trauma.

 

ENCOUNTER:     

Subsequent                                        

 

ACUITY:     

1 day      

 

PAIN SCORE:     

0/10

 

LOCATION:     

Bilateral chest 

 

FINDINGS:     

Right subclavian PICC line is present with tip overlapping the expected region of the SVC.There is sl
ight linear atelectasis left lung base above the costophrenic angle. No definite pneumothorax is seen
 for technique.There is no appreciable pleural effusion for technique.  Heart and mediastinum are unr
emarkable.

 

CONCLUSION: Mild left lung base atelectasis.

 

 

 RUBY Hernandez MD on September 07, 2017 at 17:44           

Board Certified Radiologist.

 This report was verified electronically.

## 2017-09-07 NOTE — HHI.FF
__________________________________________________





Infusion Therapy


Location of Infusion Therapy:  [facility] IV Infusion Order


Patient Information


Patient Weight


80.2 kg


Diagnosis:  


Diagnosis


Edocarditis


Coded Allergies:  


     *MDRO Multi-Drug Resistant Organism (Verified  Adverse Reaction, Unknown, 8 /27/17)


 MRSA (sputum & blood) - 6/25/13


 MRSA (wound) - 9/29/08


 **MRSA PCR Screen POSITIVE - 6/28/2016**





Administer Medication


Ceftriaxone


2 grams IV


q 24 hours


Start Treatment:  Sep 8, 2017


Stop Treatment:  Oct 6, 2017





Additional Information


Venous access:  PICC Line


Additional Instructions





[x] Peripheral flush and dressing changes per protocol


[x] Implanted port and central :


* Implanted port: 10 ml Normal Saline followed by 5 ml Heparin 100 units/ml 

Heparin flush


   after each use and monthly to maintain.


   [] May leave port accessed during therapy.


   [] May leave peripheral site accessed for duration of therapy.





[x] If patient has SOB or respiratory distress, check oxygen saturation. If 

less than 90% or clinical signs of respiratory distress, administer oxygen at 2 

L/min. via nasal cannula and notify physician.





[x] Anaphylaxis/Reaction orders:


* Stop infusion.


* Keep IV line open with saline flush.


* Notify physician.


* Monitor vital signs every 15 minutes until symptoms resolve.


* Check Oxygen saturation; Oxygen at 2 L/min. via nasal cannula if less than 90%

 or clinical signs of respiratory distress.


* Administer diphenhydramine (Benadryl) 25 mg IV STAT, (unless patient has 

received as pre-med). May repeat once, if necessary.


* Solu-Cortef 250 mg IVP over 30-60 seconds, use 100 mg vials for each 

dissolution.


* Epinephrine (1mg/1 ml) 0.3 mg subcutaneously or IVP now with any signs of 

respiratory distress.


* Check with physician for new additional pre-med orders if patient is re-

challenged or re-treated.


[x] May remove PICC line when treatment complete, after confirming with 

Physician.


[x] If the patient is admitted to the hospital, the ED, or transferred via EVAC

, complete transfer form including medication reconciliation order sheet.





Laboratory Tests


Weekly Labs:  CBC w/diff, Creatinine, LFT's (Hepatic function test), SED Rate











Destiny Miller MD Sep 7, 2017 12:42

## 2017-09-07 NOTE — HHI.DCPOC
Discharge Care Plan


Diagnosis:  


(1) Endocarditis of mitral valve


(2) Hepatitis B


(3) Polysubstance abuse


Goals to Promote Your Health


* To prevent worsening of your condition and complications


* To maintain your health at the optimal level


* Please be sure to follow up daily with antibiotic treatment at the infusion 

clinic


Directions to Meet Your Goals


*** Take your medications as prescribed


*** Follow your dietary instruction


*** Follow activity as directed








*** Keep your appointments as scheduled


*** Take your immunizations and boosters as scheduled


*** If your symptoms worsen call your PCP, if no PCP go to Urgent Care Center 

or Emergency Room***


*** Smoking is Dangerous to Your Health. Avoid second hand smoke***


***Call the 24-hour hour crisis hotline for domestic abuse at [phone]***











Rudy Flanagan MD R1 Sep 7, 2017 16:49
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
## 2018-02-05 ENCOUNTER — HOSPITAL ENCOUNTER (EMERGENCY)
Dept: HOSPITAL 17 - NEPK | Age: 38
Discharge: HOME | End: 2018-02-05
Payer: MEDICAID

## 2018-02-05 VITALS
RESPIRATION RATE: 16 BRPM | HEART RATE: 94 BPM | SYSTOLIC BLOOD PRESSURE: 138 MMHG | TEMPERATURE: 99 F | DIASTOLIC BLOOD PRESSURE: 64 MMHG | OXYGEN SATURATION: 99 %
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
## 2018-02-05 DIAGNOSIS — S39.012A: Primary | ICD-10-CM

## 2018-02-05 DIAGNOSIS — M62.838: ICD-10-CM
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
## 2018-02-05 PROCEDURE — 99283 EMERGENCY DEPT VISIT LOW MDM: CPT
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
## 2018-02-22 ENCOUNTER — HOSPITAL ENCOUNTER (INPATIENT)
Dept: HOSPITAL 17 - NEPC | Age: 38
LOS: 12 days | Discharge: HOME | DRG: 539 | End: 2018-03-06
Attending: FAMILY MEDICINE | Admitting: FAMILY MEDICINE
Payer: MEDICAID

## 2018-02-22 VITALS — OXYGEN SATURATION: 97 %

## 2018-02-22 VITALS
OXYGEN SATURATION: 100 % | SYSTOLIC BLOOD PRESSURE: 128 MMHG | TEMPERATURE: 98.7 F | RESPIRATION RATE: 18 BRPM | HEART RATE: 86 BPM | DIASTOLIC BLOOD PRESSURE: 69 MMHG

## 2018-02-22 VITALS
HEART RATE: 77 BPM | RESPIRATION RATE: 19 BRPM | SYSTOLIC BLOOD PRESSURE: 103 MMHG | DIASTOLIC BLOOD PRESSURE: 65 MMHG | OXYGEN SATURATION: 98 %

## 2018-02-22 VITALS
SYSTOLIC BLOOD PRESSURE: 101 MMHG | RESPIRATION RATE: 18 BRPM | DIASTOLIC BLOOD PRESSURE: 60 MMHG | OXYGEN SATURATION: 98 % | HEART RATE: 78 BPM

## 2018-02-22 VITALS
OXYGEN SATURATION: 100 % | RESPIRATION RATE: 18 BRPM | TEMPERATURE: 99.9 F | HEART RATE: 111 BPM | SYSTOLIC BLOOD PRESSURE: 130 MMHG | DIASTOLIC BLOOD PRESSURE: 73 MMHG

## 2018-02-22 VITALS
DIASTOLIC BLOOD PRESSURE: 66 MMHG | HEART RATE: 75 BPM | OXYGEN SATURATION: 99 % | RESPIRATION RATE: 18 BRPM | SYSTOLIC BLOOD PRESSURE: 101 MMHG | TEMPERATURE: 98 F

## 2018-02-22 VITALS
HEART RATE: 77 BPM | RESPIRATION RATE: 19 BRPM | DIASTOLIC BLOOD PRESSURE: 62 MMHG | OXYGEN SATURATION: 97 % | SYSTOLIC BLOOD PRESSURE: 118 MMHG

## 2018-02-22 VITALS — HEIGHT: 75 IN | WEIGHT: 155.21 LBS | BODY MASS INDEX: 19.3 KG/M2

## 2018-02-22 DIAGNOSIS — R42: ICD-10-CM

## 2018-02-22 DIAGNOSIS — D64.9: ICD-10-CM

## 2018-02-22 DIAGNOSIS — I35.8: ICD-10-CM

## 2018-02-22 DIAGNOSIS — B19.10: ICD-10-CM

## 2018-02-22 DIAGNOSIS — G89.29: ICD-10-CM

## 2018-02-22 DIAGNOSIS — R20.2: ICD-10-CM

## 2018-02-22 DIAGNOSIS — B19.20: ICD-10-CM

## 2018-02-22 DIAGNOSIS — M79.661: ICD-10-CM

## 2018-02-22 DIAGNOSIS — M46.20: Primary | ICD-10-CM

## 2018-02-22 DIAGNOSIS — F11.20: ICD-10-CM

## 2018-02-22 DIAGNOSIS — Z86.79: ICD-10-CM

## 2018-02-22 DIAGNOSIS — Z72.0: ICD-10-CM

## 2018-02-22 DIAGNOSIS — D69.6: ICD-10-CM

## 2018-02-22 DIAGNOSIS — I33.0: ICD-10-CM

## 2018-02-22 DIAGNOSIS — M46.40: ICD-10-CM

## 2018-02-22 DIAGNOSIS — R20.0: ICD-10-CM

## 2018-02-22 LAB
ALBUMIN SERPL-MCNC: 1.8 GM/DL (ref 3.4–5)
ALP SERPL-CCNC: 157 U/L (ref 45–117)
ALT SERPL-CCNC: 57 U/L (ref 12–78)
AST SERPL-CCNC: 78 U/L (ref 15–37)
BASOPHILS # BLD AUTO: 0 TH/MM3 (ref 0–0.2)
BASOPHILS NFR BLD: 0.4 % (ref 0–2)
BILIRUB SERPL-MCNC: 0.8 MG/DL (ref 0.2–1)
BUN SERPL-MCNC: 14 MG/DL (ref 7–18)
CALCIUM SERPL-MCNC: 7.6 MG/DL (ref 8.5–10.1)
CHLORIDE SERPL-SCNC: 105 MEQ/L (ref 98–107)
COLOR UR: YELLOW
CREAT SERPL-MCNC: 0.7 MG/DL (ref 0.6–1.3)
EOSINOPHIL # BLD: 0 TH/MM3 (ref 0–0.4)
EOSINOPHIL NFR BLD: 0.4 % (ref 0–4)
ERYTHROCYTE [DISTWIDTH] IN BLOOD BY AUTOMATED COUNT: 14.7 % (ref 11.6–17.2)
GFR SERPLBLD BASED ON 1.73 SQ M-ARVRAT: 128 ML/MIN (ref 89–?)
GLUCOSE SERPL-MCNC: 96 MG/DL (ref 74–106)
GLUCOSE UR STRIP-MCNC: (no result) MG/DL
HCO3 BLD-SCNC: 24.1 MEQ/L (ref 21–32)
HCT VFR BLD CALC: 23.7 % (ref 39–51)
HGB BLD-MCNC: 8 GM/DL (ref 13–17)
HGB UR QL STRIP: (no result)
INR PPP: 1.3 RATIO
KETONES UR STRIP-MCNC: (no result) MG/DL
LYMPHOCYTES # BLD AUTO: 0.7 TH/MM3 (ref 1–4.8)
LYMPHOCYTES NFR BLD AUTO: 12.8 % (ref 9–44)
LYMPHOCYTES: 7 % (ref 9–44)
MCH RBC QN AUTO: 28.2 PG (ref 27–34)
MCHC RBC AUTO-ENTMCNC: 33.9 % (ref 32–36)
MCV RBC AUTO: 83.2 FL (ref 80–100)
MONOCYTE #: 0.6 TH/MM3 (ref 0–0.9)
MONOCYTES NFR BLD: 10.9 % (ref 0–8)
MONOCYTES: 7 % (ref 0–8)
MUCOUS THREADS #/AREA URNS LPF: (no result) /LPF
NEUTROPHILS # BLD AUTO: 4 TH/MM3 (ref 1.8–7.7)
NEUTROPHILS NFR BLD AUTO: 75.5 % (ref 16–70)
NEUTS BAND # BLD MANUAL: 4.5 TH/MM3 (ref 1.8–7.7)
NEUTS BAND NFR BLD: 14 % (ref 0–6)
NEUTS SEG NFR BLD MANUAL: 72 % (ref 16–70)
NITRITE UR QL STRIP: (no result)
PLATELET # BLD: 89 TH/MM3 (ref 150–450)
PMV BLD AUTO: 8.4 FL (ref 7–11)
PROT SERPL-MCNC: 6.1 GM/DL (ref 6.4–8.2)
PROTHROMBIN TIME: 13.6 SEC (ref 9.8–11.6)
RBC # BLD AUTO: 2.85 MIL/MM3 (ref 4.5–5.9)
SODIUM SERPL-SCNC: 135 MEQ/L (ref 136–145)
SP GR UR STRIP: 1.02 (ref 1–1.03)
URINE LEUKOCYTE ESTERASE: (no result)
WBC # BLD AUTO: 5.2 TH/MM3 (ref 4–11)
WBC TOXIC VACUOLES BLD QL SMEAR: PRESENT

## 2018-02-22 PROCEDURE — 81001 URINALYSIS AUTO W/SCOPE: CPT

## 2018-02-22 PROCEDURE — 80053 COMPREHEN METABOLIC PANEL: CPT

## 2018-02-22 PROCEDURE — 80307 DRUG TEST PRSMV CHEM ANLYZR: CPT

## 2018-02-22 PROCEDURE — 93971 EXTREMITY STUDY: CPT

## 2018-02-22 PROCEDURE — 76937 US GUIDE VASCULAR ACCESS: CPT

## 2018-02-22 PROCEDURE — 86140 C-REACTIVE PROTEIN: CPT

## 2018-02-22 PROCEDURE — 83550 IRON BINDING TEST: CPT

## 2018-02-22 PROCEDURE — 80048 BASIC METABOLIC PNL TOTAL CA: CPT

## 2018-02-22 PROCEDURE — 82550 ASSAY OF CK (CPK): CPT

## 2018-02-22 PROCEDURE — 93306 TTE W/DOPPLER COMPLETE: CPT

## 2018-02-22 PROCEDURE — 80202 ASSAY OF VANCOMYCIN: CPT

## 2018-02-22 PROCEDURE — 96361 HYDRATE IV INFUSION ADD-ON: CPT

## 2018-02-22 PROCEDURE — 87040 BLOOD CULTURE FOR BACTERIA: CPT

## 2018-02-22 PROCEDURE — 83540 ASSAY OF IRON: CPT

## 2018-02-22 PROCEDURE — 85610 PROTHROMBIN TIME: CPT

## 2018-02-22 PROCEDURE — 36569 INSJ PICC 5 YR+ W/O IMAGING: CPT

## 2018-02-22 PROCEDURE — A9579 GAD-BASE MR CONTRAST NOS,1ML: HCPCS

## 2018-02-22 PROCEDURE — 82746 ASSAY OF FOLIC ACID SERUM: CPT

## 2018-02-22 PROCEDURE — 96360 HYDRATION IV INFUSION INIT: CPT

## 2018-02-22 PROCEDURE — 85025 COMPLETE CBC W/AUTO DIFF WBC: CPT

## 2018-02-22 PROCEDURE — 82272 OCCULT BLD FECES 1-3 TESTS: CPT

## 2018-02-22 PROCEDURE — A9569 TECHNETIUM TC-99M AUTO WBC: HCPCS

## 2018-02-22 PROCEDURE — 87205 SMEAR GRAM STAIN: CPT

## 2018-02-22 PROCEDURE — 83605 ASSAY OF LACTIC ACID: CPT

## 2018-02-22 PROCEDURE — 78806: CPT

## 2018-02-22 PROCEDURE — 84155 ASSAY OF PROTEIN SERUM: CPT

## 2018-02-22 PROCEDURE — L0484 TLSO RIGID PLASTIC CUST FAB: HCPCS

## 2018-02-22 PROCEDURE — 85652 RBC SED RATE AUTOMATED: CPT

## 2018-02-22 PROCEDURE — 85027 COMPLETE CBC AUTOMATED: CPT

## 2018-02-22 PROCEDURE — 85730 THROMBOPLASTIN TIME PARTIAL: CPT

## 2018-02-22 PROCEDURE — 82607 VITAMIN B-12: CPT

## 2018-02-22 PROCEDURE — 72158 MRI LUMBAR SPINE W/O & W/DYE: CPT

## 2018-02-22 PROCEDURE — 87186 SC STD MICRODIL/AGAR DIL: CPT

## 2018-02-22 PROCEDURE — 80170 ASSAY OF GENTAMICIN: CPT

## 2018-02-22 PROCEDURE — 71045 X-RAY EXAM CHEST 1 VIEW: CPT

## 2018-02-22 PROCEDURE — 85007 BL SMEAR W/DIFF WBC COUNT: CPT

## 2018-02-22 PROCEDURE — 85044 MANUAL RETICULOCYTE COUNT: CPT

## 2018-02-22 PROCEDURE — 82728 ASSAY OF FERRITIN: CPT

## 2018-02-22 RX ADMIN — CYCLOBENZAPRINE HYDROCHLORIDE PRN MG: 10 TABLET, FILM COATED ORAL at 20:44

## 2018-02-22 RX ADMIN — Medication SCH ML: at 21:00

## 2018-02-22 RX ADMIN — STANDARDIZED SENNA CONCENTRATE AND DOCUSATE SODIUM SCH TAB: 8.6; 5 TABLET, FILM COATED ORAL at 20:43

## 2018-02-22 NOTE — PD
HPI


Chief Complaint:  Pain: Acute or Chronic


Time Seen by Provider:  07:26


Travel History


International Travel<30 days:  No


Contact w/Intl Traveler<30days:  No


Traveled to known affect area:  No





History of Present Illness


HPI


This patient complains of back pain.  Patient has midline back pain for 2-3 

weeks.  He was seen here on February 5 for the same thing.  He is no better.  

He is having low-grade fevers.  He has history of IV drug abuse.  He says he 

has not used IV drugs in 2 years.  He takes methadone daily.  He has history of 

vertebral osteomyelitis and endocarditis.  He denies cough or diarrhea or any 

respiratory symptoms.  Symptoms have moderate severity.  No leg weakness or 

incontinence or retention of urine.  There was no specific injury to cause this 

pain.  He was hoping when he came a couple weeks ago that this was caused by 

straining his back but now he does not think so.  No alleviating factors.  No 

exacerbating factors.





PFSH


Past Medical History


Cardiovascular Problems:  Yes (ENDOCARDITIS)


Diminished Hearing:  No


Gastrointestinal Disorders:  Yes


Headaches:  Yes


Hepatitis:  Yes (hep c)


Implanted Vascular Access Dvce:  Yes


Musculoskeletal:  Yes


Neurologic:  Yes


Integumentary:  Yes


Immunizations Current:  Yes


Migraines:  Yes


Ulcer:  No





Past Surgical History


Abdominal Surgery:  No


AICD:  No


Cardiac Surgery:  No


Ear Surgery:  No


Endocrine Surgery:  No


Eye Surgery:  No


Genitourinary Surgery:  No


Gynecologic Surgery:  No


Insulin Pump:  No


Neurologic Surgery:  Yes (CRUSHED SKULL)


Oral Surgery:  Yes (ALL TEETH EXTRACTED)


Pacemaker:  No


Thoracic Surgery:  No


Other Surgery:  Yes





Social History


Alcohol Use:  Yes (1/2 PINT A DAY PREVIOUSLY)


Tobacco Use:  Yes (1   PACK A DAY )


Substance Use:  Yes (amphetemines)





Allergies-Medications


(Allergen,Severity, Reaction):  


Coded Allergies:  


     *MDRO Multi-Drug Resistant Organism (Verified  Adverse Reaction, Unknown, 2 /5/18)


 MRSA (sputum & blood) - 6/25/13


 MRSA (wound) - 9/29/08


 **MRSA PCR Screen POSITIVE - 6/28/2016**


Reported Meds & Prescriptions





Reported Meds & Active Scripts


Active


Gabapentin 100 Mg Cap 100 Mg PO TID


Flexeril (Cyclobenzaprine HCl) 10 Mg Tab 10 Mg PO TID PRN


Ibuprofen 800 Mg Tab 800 Mg PO Q6HR PRN








Review of Systems


General / Constitutional:  Positive: Fever


Eyes:  No: Visual changes


HENT:  No: Headaches


Cardiovascular:  No: Chest Pain or Discomfort


Respiratory:  No: Shortness of Breath


Gastrointestinal:  No: Abdominal Pain


Genitourinary:  No: Dysuria


Musculoskeletal:  Positive: Pain


Skin:  No Rash


Neurologic:  No: Weakness


Psychiatric:  Positive: Substance Abuse, No: Depression


Endocrine:  No: Polydipsia


Hematologic/Lymphatic:  No: Easy Bruising





Physical Exam


Narrative


GENERAL: Well-nourished, well-developed patient with fever and back pain .


SKIN: Focused skin assessment reveals no rash and nodules. Skin is Warm and dry.


HEAD: Atraumatic. Normocephalic. 


EYES: Pupils equal and round. No scleral icterus. No injection or drainage. 


ENT: No nasal bleeding or discharge.  Mucous membranes pink and moist.


NECK: Trachea midline. No JVD. 


CARDIOVASCULAR: Regular rate and rhythm.  2 out of 6 systolic murmur heard.


RESPIRATORY: No accessory muscle use. Clear to auscultation. Breath sounds 

equal bilaterally. 


GASTROINTESTINAL: Abdomen soft, non-tender, nondistended. Hepatic and splenic 

margins not palpable. 


MUSCULOSKELETAL: No obvious deformities. No clubbing.  No cyanosis.  No edema.  

There is no erythema or bruising of the back.  Questionable minor tenderness 

there.  Level of discomfort is about L2


NEUROLOGICAL: Awake and alert. No obvious cranial nerve deficits.  Motor 

grossly within normal limits. Normal speech.


PSYCHIATRIC: Appropriate mood and affect; insight and judgment poor.





Data


Data


Last Documented VS





Vital Signs








  Date Time  Temp Pulse Resp B/P (MAP) Pulse Ox O2 Delivery O2 Flow Rate FiO2


 


2/22/18 07:55     97 Room Air  


 


2/22/18 06:52 99.9 111 18     








Orders





 Orders


Sepsis Workup Initiated (2/22/18 )


Complete Blood Count With Diff (2/22/18 07:31)


Comprehensive Metabolic Panel (2/22/18 07:31)


Prothrombin Time / Inr (Pt) (2/22/18 07:31)


Act Partial Throm Time (Ptt) (2/22/18 07:31)


Lactic Acid Sepsis Protocol (2/22/18 07:31)


Urinalysis - C+S If Indicated (2/22/18 07:31)


Blood Culture (2/22/18 07:31)


Chest, Single Ap (2/22/18 07:31)


Ecg Monitoring (2/22/18 07:31)


Iv Access Insert/Monitor (2/22/18 07:31)


Oximetry (2/22/18 07:31)


Oxygen Administration (2/22/18 07:31)


Mri L Spine W&W/O Contrast (2/22/18 )


Sodium Chlor 0.9% 1000 Ml Inj (Ns 1000 M (2/22/18 07:45)


Gadodiamide Pf Inj (Omniscan Pf Inj) (2/22/18 06:51)


Westergren Sedimentation Rate (2/22/18 12:57)


Admit To Inpatient (2/22/18 )


Code Status (2/22/18 14:00)


Vital Signs (Adult) Q4H (2/22/18 14:00)


Activity Oob With Assistance (2/22/18 14:00)


Intake + Output NAINA.QSHIFT (2/22/18 14:00)


Diet Npo (2/22/18 Lunch)


Sodium Chlor 0.9% 1000 Ml Inj (Ns 1000 M (2/22/18 14:00)


Sodium Chloride 0.9% Flush (Ns Flush) (2/22/18 14:00)


Sodium Chloride 0.9% Flush (Ns Flush) (2/22/18 21:00)


Ondansetron Inj (Zofran Inj) (2/22/18 14:00)


Basic Metabolic Panel (Bmp) (2/23/18 06:00)


Complete Blood Count With Diff (2/23/18 06:00)


Case Management Consult (2/22/18 14:00)


Zolpidem (Ambien) (2/22/18 14:00)


Scd Bilateral/Knee High NAINA.BID (2/22/18 14:00)


Pharmacologic Contraindication (2/22/18 14:00)


Naloxone Inj (Narcan Inj) (2/22/18 14:00)


Docusate Sodium-Senna (Nadya-Colace) (2/22/18 21:00)


Magnesium Hydroxide Liq (Milk Of Magnesi (2/22/18 14:00)


Sennosides (Senokot) (2/22/18 14:00)


Bisacodyl Supp (Dulcolax Supp) (2/22/18 14:00)


Lactulose Liq (Lactulose Liq) (2/22/18 14:00)


Inpatient Certification (2/22/18 )


Drug Screen, Random Urine (2/22/18 14:00)


Bladder Scan PRN (2/22/18 14:00)


C-Reactive Protein (Crp) (2/22/18 14:00)


Echo 2d Comp With Doppler (2/22/18 )


Wbc Wb Ceretec (2/22/18 )


Creatine Kinase (Cpk) (2/22/18 14:07)


Consult Infectious Disease (2/22/18 )





Labs





Laboratory Tests








Test


  2/22/18


07:45 2/22/18


09:15


 


White Blood Count 5.2 TH/MM3  


 


Red Blood Count 2.85 MIL/MM3  


 


Hemoglobin 8.0 GM/DL  


 


Hematocrit 23.7 %  


 


Mean Corpuscular Volume 83.2 FL  


 


Mean Corpuscular Hemoglobin 28.2 PG  


 


Mean Corpuscular Hemoglobin


Concent 33.9 % 


  


 


 


Red Cell Distribution Width 14.7 %  


 


Platelet Count 89 TH/MM3  


 


Mean Platelet Volume 8.4 FL  


 


Neutrophils (%) (Auto) 75.5 %  


 


Lymphocytes (%) (Auto) 12.8 %  


 


Monocytes (%) (Auto) 10.9 %  


 


Eosinophils (%) (Auto) 0.4 %  


 


Basophils (%) (Auto) 0.4 %  


 


Neutrophils # (Auto) 4.0 TH/MM3  


 


Lymphocytes # (Auto) 0.7 TH/MM3  


 


Monocytes # (Auto) 0.6 TH/MM3  


 


Eosinophils # (Auto) 0.0 TH/MM3  


 


Basophils # (Auto) 0.0 TH/MM3  


 


CBC Comment AUTO DIFF  


 


Differential Total Cells


Counted 100 


  


 


 


Neutrophils % (Manual) 72 %  


 


Band Neutrophils % 14 %  


 


Lymphocytes % 7 %  


 


Monocytes % 7 %  


 


Neutrophils # (Manual) 4.5 TH/MM3  


 


Differential Comment


  FINAL DIFF


MANUAL 


 


 


Toxic Vacuolation PRESENT  


 


Platelet Estimate LOW  


 


Platelet Morphology Comment NORMAL  


 


Erythrocyte Sedimentation Rate 51 mm/hr  


 


Prothrombin Time 13.6 SEC  


 


Prothromb Time International


Ratio 1.3 RATIO 


  


 


 


Activated Partial


Thromboplast Time 32.2 SEC 


  


 


 


Blood Urea Nitrogen 14 MG/DL  


 


Creatinine 0.70 MG/DL  


 


Random Glucose 96 MG/DL  


 


Total Protein 6.1 GM/DL  


 


Albumin 1.8 GM/DL  


 


Calcium Level 7.6 MG/DL  


 


Alkaline Phosphatase 157 U/L  


 


Aspartate Amino Transf


(AST/SGOT) 78 U/L 


  


 


 


Alanine Aminotransferase


(ALT/SGPT) 57 U/L 


  


 


 


Total Bilirubin 0.8 MG/DL  


 


Sodium Level 135 MEQ/L  


 


Potassium Level 3.7 MEQ/L  


 


Chloride Level 105 MEQ/L  


 


Carbon Dioxide Level 24.1 MEQ/L  


 


Anion Gap 6 MEQ/L  


 


Estimat Glomerular Filtration


Rate 128 ML/MIN 


  


 


 


Lactic Acid Level 1.1 mmol/L  


 


Urine Color  YELLOW 


 


Urine Turbidity  CLEAR 


 


Urine pH  6.0 


 


Urine Specific Gravity  1.020 


 


Urine Protein  TRACE mg/dL 


 


Urine Glucose (UA)  NEG mg/dL 


 


Urine Ketones  NEG mg/dL 


 


Urine Occult Blood  NEG 


 


Urine Nitrite  NEG 


 


Urine Bilirubin  NEG 


 


Urine Urobilinogen


  


  GREATER THAN


12.0 MG/DL


 


Urine Leukocyte Esterase  NEG 


 


Urine RBC  3 /hpf 


 


Urine WBC  2 /hpf 


 


Urine Calcium Oxalate Crystals  OCC /hpf 


 


Urine Mucus  MANY /lpf 


 


Microscopic Urinalysis Comment


  


  CATH-CULT NOT


IND











MDM


Medical Decision Making


Medical Screen Exam Complete:  Yes


Emergency Medical Condition:  Yes


Medical Record Reviewed:  Yes


Differential Diagnosis


Epidural abscess, musculoskeletal back pain, discitis, osteomyelitis, 

pyelonephritis


Narrative Course


I have reviewed the patient's electronic medical record.  Reviewed his 

discharge summary from prior admission for endocarditis





This patient may have something critically wrong such as epidural abscess given 

his fever and back pain and IV drug use history





I have ordered extensive workup


IV placed and IV normal saline 1 L given


2 blood culture sets obtained


Labs sent


I reviewed his chest x-ray which is negative for consolidation


I have ordered an MRI of the lumbosacral spine with and without contrast 





MRI reveals a disc herniation but no epidural abscess or discitis or 

osteomyelitis of the vertebra


He does have heart murmur and fever and tachycardia with history of IV drug 

abuse.  He will be admitted for evaluation of bacteremia and IV antibiotics. I 

reviewed his medical residents





Diagnosis





 Primary Impression:  


 Fever


 Qualified Codes:  R50.9 - Fever, unspecified


 Additional Impressions:  


 hx of IVDA


 Heart murmur


 Back pain


 Qualified Codes:  M54.5 - Low back pain





Admitting Information


Admitting Physician Requests:  Admit











Alvaro Quispe MD Feb 22, 2018 07:45

## 2018-02-22 NOTE — RADRPT
EXAM DATE/TIME:  02/22/2018 09:36 

 

[facility] COMPARISON:     

MRI LUMBAR SPINE W & W/O CONTRAST, June 14, 2016, 13:24.  MRI LUMBAR SPINE W & W/O CONTRAST, Septembe
r 28, 2016, 17:55.

       

 

 

INDICATIONS :     

Increasing lower back pain.

                     

 

CONTRAST:     

15 cc Omniscan (gadodiamide) IV

                     

 

MEDICAL HISTORY :     

None.     

 

SURGICAL HISTORY :           

Lt leg ORIF then hardware removed

 

ENCOUNTER:     

Initial

 

ACUITY:     

2 weeks

 

PAIN SCORE:     

5/10

 

LOCATION:         

lower back

 

TECHNIQUE:     

Multiplanar multisequence MRI of the lumbar spine was performed with and without contrast.

 

FINDINGS:     

Prior MRI is in 2016 had demonstrated posterior paravertebral abscess at L4 and L5, enhancing marrow 
at L4-L5, anterolisthesis at L4-5 and L5-S1.  On today's examination, the alignment at L5-S1 is stabl
e with grade 1 anterolisthesis.  There is marked narrowing of the L5-S1 interspace.  Signal change in
 the marrow of the L5 and S1 vertebral bodies is similar to prior examination with some minimal enhan
cement in the marrow and location.  There is broad-based bulging of the disc at the L5-S1 without sig
nificant deformity of the thecal sac.  Some extension into the neural foramen bilaterally causing teresita
ral impingement on the left side is stable from prior.

 

At L4-5, there is central protrusion of the disc which is contained within the epidural fat.  The pro
trusion does extend superior to the disc space, which is a new finding, and a superior extension demo
nstrates some enhancement suggesting granulation tissue or enhancement in the extruded fragment.  The
 ventral margin of the thecal sac is not significantly deformed.

 

Hypertrophy of the posterior elements at L3-4 causing dorsolateral impression on the thecal sac is si
milar to prior examination.

 

On the postcontrast study, there is some mild epidural enhancement both ventral and dorsal to the the
pat sac at L4 and L5.  The

 

There is some minimal enhancement in the soft tissues posteriorly about the spinous processes of L3-S
1, similar to the prior examination in September 2006 pain.  No focal abscess seen.

 

CONCLUSION:     

1. Evidence of interval development of a superior right parasagittal extrusion of the disc at the L4-
5 level with enhancement in the extruded fragment.  There is extension into the neural foramen on rig
ht side with neural impingement.  There is no significant deformity of the thecal sac and..

2. Stable broad-based bulging of the L5-S1 disc and impingement of neural foraminal side.

3. Mild enhancement in the posterior soft tissues of the lower lumbar region without discrete abscess
 formation.

 

 

 

 Ken Sykes MD on February 22, 2018 at 10:47           

Board Certified Radiologist.

 This report was verified electronically.
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
## 2018-02-22 NOTE — [email or web address]
History of Present Illness


Service


ID


Consult Requested By





Reason for Consult


Evaluation and Mment of possible discitis.


Primary Care Physician


No Primary Care Physician


Diagnoses:  


History of Present Illness


Mr. 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 

presents with a three-week history of acute low back pain and subjective 

fevers. He does have chronic low back pain but reports an increase in the 

intensity of pain over last 3 weeks. This pain is worsened with movement and is 

relieved with ibuprofen.  Patient reports subjective fevers denies any chills 

is unsure about night sweats.  For work, he climbs trees and cuts limbs.  





Patient reports history of trauma with a tree limb that fell on him.


He reports nausea but no vomiting or diarrhea


Patient denies any bowel bladder incontinence.  He does report a change in his 

stream of urine for the last 3 weeks but denies any incontinence.


Patient denies any loss of sensation in the genitourinary area.


Patient denies any lower extremity or upper extremity weakness at the present 

time.


Patient denies any cardiorespiratory symptoms at the present time.


Patient reports that he has chronic numbness and tingling in his feet 

bilaterally but this is not new.


Patient denies any headache change in vision.


He denies any IV drug abuse but admits to doing oral or snorting medications.  

He reports taking methadone 30 mg per day.


He also reports snorting methamphetamine 2-3 times a week for the past 1-2 

months.





Due to his presenting symptoms and prior history and MRI of the spine was done 

which shows soft tissue density in the lumbar region but no discrete abscess.  

No obvious vertebral bone involvement or obvious fluid collection that can be 

drained at the present time.  Blood cultures have been drawn.  A 2-D echo is 

pending.  I discussed the case with Dr. Burleson and requested that no 

antibiotics be given to the patient unless the patient has positive cultures or 

obvious signs of sepsis.  I would like a WBC scan to be done to decide the 

further course of action.





Review of Systems


ROS Limitations:  Poor Historian


Constitutional:  COMPLAINS OF: Fever, Dizziness, Change in appetite, DENIES: 

Diaphoretic episodes, Fatigue, Weight gain, Weight loss, Chills, Night Sweats


Endocrine:  DENIES: Heat/cold intolerance, Polydipsia, Polyuria, Polyphagia


Eyes:  DENIES: Blurred vision, Diplopia, Eye inflammation, Eye pain, Vision loss

, Photosensitivity, Double Vision


Ears, nose, mouth, throat:  DENIES: Tinnitus, Hearing loss, Vertigo, Nasal 

discharge, Oral lesions, Throat pain, Hoarseness, Ear Pain, Running Nose, 

Epistaxis, Sinus Pain, Toothache, Odynophagia


Respiratory:  DENIES: Apneas, Cough, Snoring, Wheezing, Hemoptysis, Sputum 

production, Shortness of breath


Cardiovascular:  DENIES: Chest pain, Palpitations, Syncope, Dyspnea on Exertion

, PND, Lower Extremity Edema, Orthopnea, Claudication


Gastrointestinal:  DENIES: Abdominal pain, Black stools, Bloody stools, 

Constipation, Diarrhea, Nausea, Vomiting, Difficulty Swallowing, Anorexia


Genitourinary:  DENIES: Sexual dysfunction, Urinary frequency, Urinary 

incontinence, Urgency, Hematuria, Dysuria, Nocturia, Penile Discharge, 

Testicular Pain, Testicular Swelling


Musculoskeletal:  COMPLAINS OF: Back pain, DENIES: Joint pain, Muscle aches, 

Stiffness, Joint Swelling, Neck pain


Integumentary:  DENIES: Abnormal pigmentation, Nail changes, Pruritus, Rash


Hematologic/lymphatic:  DENIES: Bruising, Lymphadenopathy


Immunologic/allergic:  DENIES: Eczema, Urticaria


Neurologic:  DENIES: Abnormal gait, Headache, Localized weakness, Paresthesias, 

Seizures, Speech Problems, Tremor, Poor Balance


Psychiatric:  DENIES: Anxiety, Confusion, Mood changes, Depression, 

Hallucinations, Agitation, Suicidal Ideation, Homicidal Ideation, Delusions


Except as stated in HPI:  all other systems reviewed are Neg





Past Family Social History


Allergies:  


Coded Allergies:  


     *MDRO Multi-Drug Resistant Organism (Verified  Adverse Reaction, Unknown, 2 /5/18)


 MRSA (sputum & blood) - 6/25/13


 MRSA (wound) - 9/29/08


 **MRSA PCR Screen POSITIVE - 6/28/2016**


Past Medical History


Mitral valve endocarditis in September 2016


Vertebral osteomyelitis in July 2016


Chronic back pain


Polysubstance IV drug abuse


Tobacco abuse


Past Surgical History


Right tibia fracture repair with plates


Reported Medications





Reported Meds & Active Scripts


Active


Gabapentin 100 Mg Cap 100 Mg PO TID


Flexeril (Cyclobenzaprine HCl) 10 Mg Tab 10 Mg PO TID PRN


Ibuprofen 800 Mg Tab 800 Mg PO Q6HR PRN


Active Ordered Medications





Current Medications








 Medications


  (Trade)  Dose


 Ordered  Sig/Eduardo


 Route  Start Time


 Stop Time Status Last Admin


 


  (NS Flush)  2 ml  UNSCH  PRN


 IV FLUSH  2/22/18 14:00


     


 


 


  (NS Flush)  2 ml  BID


 IV FLUSH  2/22/18 21:00


     


 


 


  (Zofran Inj)  4 mg  Q6H  PRN


 IVP  2/22/18 14:00


     


 


 


  (Ambien)  5 mg  HS  PRN


 PO  2/22/18 21:00


     


 


 


  (Narcan Inj)  0.4 mg  UNSCH  PRN


 IV PUSH  2/22/18 14:00


     


 


 


  (Nadya-Colace)  1 tab  BID


 PO  2/22/18 21:00


     


 


 


  (Milk Of


 Magnesia Liq)  30 ml  Q12H  PRN


 PO  2/22/18 14:00


     


 


 


  (Senokot)  17.2 mg  Q12H  PRN


 PO  2/22/18 14:00


     


 


 


  (Dulcolax Supp)  10 mg  DAILY  PRN


 RECTAL  2/22/18 14:00


     


 


 


  (Lactulose Liq)  30 ml  DAILY  PRN


 PO  2/22/18 14:00


     


 


 


  (Dolophine)  30 mg  DAILY


 PO  2/23/18 09:00


     


 


 


  (Morphine Inj)  2 mg  Q3H  PRN


 IV PUSH  2/22/18 15:00


     


 








Family History


reviewed and NC


Social History


IVDA in past. 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





Physical Exam


Vital Signs





Vital Signs








  Date Time  Temp Pulse Resp B/P (MAP) Pulse Ox O2 Delivery O2 Flow Rate FiO2


 


2/22/18 15:00  77 19 103/65 (78) 98 Room Air  


 


2/22/18 12:00  78 18 101/60 (74) 98 Room Air  


 


2/22/18 08:00  77 19 118/62 (80) 97 Room Air  


 


2/22/18 07:55     97 Room Air  


 


2/22/18 06:52 99.9 111 18 130/73 (92) 100   








Physical Exam


GENERAL: Thin built, well-developed patient, in no apparent distress.


SKIN: Dirt and mud stuck on his palms, hands and upper extremities.


HEAD: Atraumatic. Normocephalic. No temporal or scalp tenderness.


EYES: Pupils equal round and reactive. Extraocular motions intact. No scleral 

icterus. No injection or drainage. 


ENT: Nose without bleeding, purulent drainage or septal hematoma. Throat 

without erythema, tonsillar hypertrophy or exudate. Uvula midline. Airway 

patent.


NECK: Trachea midline. Supple, nontender, no meningeal signs.


CARDIOVASCULAR: HS audible.


RESPIRATORY: Clear to auscultation. Breath sounds equal bilaterally. No wheezes

, rales, or rhonchi.  


GASTROINTESTINAL: Abdomen soft, non-tender, nondistended. No hepato-splenomegaly

, or palpable masses. No guarding.


MUSCULOSKELETAL: Extremities without clubbing, cyanosis, or edema. No joint 

tenderness, effusion, or edema noted. No calf tenderness. Negative Homans sign 

bilaterally.


Back: small area of swelling in the soft tissue in the lumbar region with 

tenderness but no erythema.


NEUROLOGICAL: Awake and alert. Cranial nerves II through XII intact.  Motor and 

sensory grossly within normal limits. Five out of 5 muscle strength in all 

muscle groups.  Normal speech. 


Psych cooperative


IV line sites with no e.o infection.


Laboratory





Laboratory Tests








Test


  2/22/18


07:45 2/22/18


09:15


 


White Blood Count 5.2  


 


Red Blood Count 2.85  


 


Hemoglobin 8.0  


 


Hematocrit 23.7  


 


Mean Corpuscular Volume 83.2  


 


Mean Corpuscular Hemoglobin 28.2  


 


Mean Corpuscular Hemoglobin


Concent 33.9 


  


 


 


Red Cell Distribution Width 14.7  


 


Platelet Count 89  


 


Mean Platelet Volume 8.4  


 


Neutrophils (%) (Auto) 75.5  


 


Lymphocytes (%) (Auto) 12.8  


 


Monocytes (%) (Auto) 10.9  


 


Eosinophils (%) (Auto) 0.4  


 


Basophils (%) (Auto) 0.4  


 


Neutrophils # (Auto) 4.0  


 


Lymphocytes # (Auto) 0.7  


 


Monocytes # (Auto) 0.6  


 


Eosinophils # (Auto) 0.0  


 


Basophils # (Auto) 0.0  


 


CBC Comment AUTO DIFF  


 


Differential Total Cells


Counted 100 


  


 


 


Neutrophils % (Manual) 72  


 


Band Neutrophils % 14  


 


Lymphocytes % 7  


 


Monocytes % 7  


 


Neutrophils # (Manual) 4.5  


 


Differential Comment


  FINAL DIFF


MANUAL 


 


 


Toxic Vacuolation PRESENT  


 


Platelet Estimate LOW  


 


Platelet Morphology Comment NORMAL  


 


Erythrocyte Sedimentation Rate 51  


 


Prothrombin Time 13.6  


 


Prothromb Time International


Ratio 1.3 


  


 


 


Activated Partial


Thromboplast Time 32.2 


  


 


 


Blood Urea Nitrogen 14  


 


Creatinine 0.70  


 


Random Glucose 96  


 


Total Protein 6.1  


 


Albumin 1.8  


 


Calcium Level 7.6  


 


Alkaline Phosphatase 157  


 


Aspartate Amino Transf


(AST/SGOT) 78 


  


 


 


Alanine Aminotransferase


(ALT/SGPT) 57 


  


 


 


Total Bilirubin 0.8  


 


Sodium Level 135  


 


Potassium Level 3.7  


 


Chloride Level 105  


 


Carbon Dioxide Level 24.1  


 


Anion Gap 6  


 


Estimat Glomerular Filtration


Rate 128 


  


 


 


Lactic Acid Level 1.1  


 


Total Creatine Kinase 20  


 


C-Reactive Protein 7.30  


 


Urine Color  YELLOW 


 


Urine Turbidity  CLEAR 


 


Urine pH  6.0 


 


Urine Specific Gravity  1.020 


 


Urine Protein  TRACE 


 


Urine Glucose (UA)  NEG 


 


Urine Ketones  NEG 


 


Urine Occult Blood  NEG 


 


Urine Nitrite  NEG 


 


Urine Bilirubin  NEG 


 


Urine Urobilinogen


  


  GREATER THAN


12.0


 


Urine Leukocyte Esterase  NEG 


 


Urine RBC  3 


 


Urine WBC  2 


 


Urine Calcium Oxalate Crystals  OCC 


 


Urine Mucus  MANY 


 


Microscopic Urinalysis Comment


  


  CATH-CULT NOT


IND


 


Urine Opiates Screen  NEG 


 


Urine Barbiturates Screen  NEG 


 


Urine Amphetamines Screen  POS 


 


Urine Benzodiazepines Screen  NEG 


 


Urine Cocaine Screen  NEG 


 


Urine Cannabinoids Screen  NEG 














 Date/Time


Source Procedure


Growth Status


 


 


 2/22/18 07:45


Blood Peripheral Aerobic Blood Culture


Pending Received


 


 2/22/18 07:45


Blood Peripheral Anaerobic Blood Culture


Pending Received








Result Diagram:  


2/22/18 0745                                                                   

             2/22/18 0745





Imaging





Last Impressions








Chest X-Ray 2/22/18 0731 Signed





Impressions: 





 Service Date/Time:  Thursday, February 22, 2018 07:48 - CONCLUSION:  1. 

Minimal 





 basilar atelectasis on the left. The lungs are otherwise clear.     Jared Padron MD 


 


Lumbar Spine MRI 2/22/18 0000 Signed





Impressions: 





 Service Date/Time:  Thursday, February 22, 2018 09:36 - CONCLUSION:  1. 

Evidence 





 of interval development of a superior right parasagittal extrusion of the disc 





 at the L4-5 level with enhancement in the extruded fragment.  There is 

extension 





 into the neural foramen on right side with neural impingement.  There is no 





 significant deformity of the thecal sac and.. 2. Stable broad-based bulging of 





 the L5-S1 disc and impingement of neural foraminal side. 3. Mild enhancement 

in 





 the posterior soft tissues of the lower lumbar region without discrete abscess 





 formation.     Ken Sykes MD 











Assessment and Plan


Assessment and Plan


Possible early discitis


Soft tissue swelling in the lumbar region possible early infective myositis


Previous history of endocarditis 


Previous history of discitis and epidural abscess





Recommendations:


Observe off of antibiotics


Okay to start antibiotics if there is change in clinical condition or worsening 

signs of sepsis or any positive blood cultures


For now would like to get a 2-D echo as well as a WBC scan.


Would like to see the WBC scan lights up so that we can focus on that area and 

obtain cultures if none of the blood cultures are positive.


Check CRP


Check post void urine to assess for any retention of urine


Discussed with Dr. Burleson to make sure the patient has a thorough shower so 

that all the dirt on his skin can be washed off and we can get better more 

accurate blood cultures.


Follow cultures


Follow clinically


I will be off from February 23, 2018 to February 25, 2018.  Other ID MDs to 

cover for me.  Please call [facility] call center for further 

information











Kiera Lucas MD Feb 22, 2018 15:19

## 2018-02-22 NOTE — RADRPT
EXAM DATE/TIME:  02/22/2018 07:48 

 

[facility] COMPARISON:     

CHEST SINGLE AP, September 07, 2017, 17:06.

 

                     

INDICATIONS :     

Chest pain.

                     

 

MEDICAL HISTORY :            

Hepatitis C. Hepatitis B.   

 

SURGICAL HISTORY :        

Right leg ORIF. Craniotomy from trauma.

 

ENCOUNTER:     

Initial                                        

 

ACUITY:     

2 days      

 

PAIN SCORE:     

5/10

 

LOCATION:     

Bilateral posterior chest

 

FINDINGS:     

There is minimal platelike atelectasis at the left lung base. This is stable compared to previous alberto
dy dated 9/7/17.

 

The heart is normal in size. The osseous structures are intact.

 

CONCLUSION:     

1. Minimal basilar atelectasis on the left. The lungs are otherwise clear.

 

 

 

 Jared Padron MD on February 22, 2018 at 8:10           

Board Certified Radiologist.

 This report was verified electronically.

## 2018-02-23 VITALS
OXYGEN SATURATION: 97 % | TEMPERATURE: 98 F | SYSTOLIC BLOOD PRESSURE: 119 MMHG | DIASTOLIC BLOOD PRESSURE: 76 MMHG | HEART RATE: 87 BPM | RESPIRATION RATE: 18 BRPM

## 2018-02-23 VITALS
DIASTOLIC BLOOD PRESSURE: 62 MMHG | RESPIRATION RATE: 16 BRPM | OXYGEN SATURATION: 97 % | TEMPERATURE: 99.2 F | HEART RATE: 95 BPM | SYSTOLIC BLOOD PRESSURE: 119 MMHG

## 2018-02-23 VITALS
TEMPERATURE: 97.5 F | RESPIRATION RATE: 18 BRPM | SYSTOLIC BLOOD PRESSURE: 100 MMHG | DIASTOLIC BLOOD PRESSURE: 61 MMHG | OXYGEN SATURATION: 97 % | HEART RATE: 76 BPM

## 2018-02-23 VITALS
OXYGEN SATURATION: 98 % | RESPIRATION RATE: 18 BRPM | SYSTOLIC BLOOD PRESSURE: 117 MMHG | HEART RATE: 109 BPM | DIASTOLIC BLOOD PRESSURE: 63 MMHG | TEMPERATURE: 100.6 F

## 2018-02-23 VITALS
RESPIRATION RATE: 18 BRPM | TEMPERATURE: 99.7 F | DIASTOLIC BLOOD PRESSURE: 72 MMHG | SYSTOLIC BLOOD PRESSURE: 114 MMHG | HEART RATE: 104 BPM | OXYGEN SATURATION: 99 %

## 2018-02-23 VITALS
OXYGEN SATURATION: 97 % | HEART RATE: 86 BPM | RESPIRATION RATE: 18 BRPM | TEMPERATURE: 98.1 F | DIASTOLIC BLOOD PRESSURE: 59 MMHG | SYSTOLIC BLOOD PRESSURE: 101 MMHG

## 2018-02-23 VITALS
SYSTOLIC BLOOD PRESSURE: 113 MMHG | OXYGEN SATURATION: 99 % | DIASTOLIC BLOOD PRESSURE: 68 MMHG | RESPIRATION RATE: 18 BRPM | TEMPERATURE: 100.2 F | HEART RATE: 105 BPM

## 2018-02-23 LAB
BASOPHILS # BLD AUTO: 0 TH/MM3 (ref 0–0.2)
BASOPHILS NFR BLD: 0.2 % (ref 0–2)
BUN SERPL-MCNC: 12 MG/DL (ref 7–18)
CALCIUM SERPL-MCNC: 7.8 MG/DL (ref 8.5–10.1)
CHLORIDE SERPL-SCNC: 106 MEQ/L (ref 98–107)
CREAT SERPL-MCNC: 0.75 MG/DL (ref 0.6–1.3)
EOSINOPHIL # BLD: 0 TH/MM3 (ref 0–0.4)
EOSINOPHIL NFR BLD: 1 % (ref 0–4)
ERYTHROCYTE [DISTWIDTH] IN BLOOD BY AUTOMATED COUNT: 14.9 % (ref 11.6–17.2)
GFR SERPLBLD BASED ON 1.73 SQ M-ARVRAT: 118 ML/MIN (ref 89–?)
GLUCOSE SERPL-MCNC: 135 MG/DL (ref 74–106)
HCO3 BLD-SCNC: 27.7 MEQ/L (ref 21–32)
HCT VFR BLD CALC: 23.1 % (ref 39–51)
HGB BLD-MCNC: 7.8 GM/DL (ref 13–17)
LYMPHOCYTES # BLD AUTO: 0.5 TH/MM3 (ref 1–4.8)
LYMPHOCYTES NFR BLD AUTO: 12.4 % (ref 9–44)
LYMPHOCYTES: 12 % (ref 9–44)
MCH RBC QN AUTO: 28.7 PG (ref 27–34)
MCHC RBC AUTO-ENTMCNC: 33.9 % (ref 32–36)
MCV RBC AUTO: 84.7 FL (ref 80–100)
MONOCYTE #: 0.3 TH/MM3 (ref 0–0.9)
MONOCYTES NFR BLD: 7.7 % (ref 0–8)
MONOCYTES: 5 % (ref 0–8)
NEUTROPHILS # BLD AUTO: 3.5 TH/MM3 (ref 1.8–7.7)
NEUTROPHILS NFR BLD AUTO: 78.7 % (ref 16–70)
NEUTS BAND # BLD MANUAL: 3.7 TH/MM3 (ref 1.8–7.7)
NEUTS BAND NFR BLD: 18 % (ref 0–6)
NEUTS SEG NFR BLD MANUAL: 65 % (ref 16–70)
PLATELET # BLD: 78 TH/MM3 (ref 150–450)
PMV BLD AUTO: 8.4 FL (ref 7–11)
RBC # BLD AUTO: 2.72 MIL/MM3 (ref 4.5–5.9)
SODIUM SERPL-SCNC: 138 MEQ/L (ref 136–145)
WBC # BLD AUTO: 4.4 TH/MM3 (ref 4–11)

## 2018-02-23 RX ADMIN — PANTOPRAZOLE SCH MG: 40 TABLET, DELAYED RELEASE ORAL at 15:54

## 2018-02-23 RX ADMIN — STANDARDIZED SENNA CONCENTRATE AND DOCUSATE SODIUM SCH TAB: 8.6; 5 TABLET, FILM COATED ORAL at 08:32

## 2018-02-23 RX ADMIN — Medication SCH ML: at 20:59

## 2018-02-23 RX ADMIN — CYCLOBENZAPRINE HYDROCHLORIDE PRN MG: 10 TABLET, FILM COATED ORAL at 23:37

## 2018-02-23 RX ADMIN — VANCOMYCIN HYDROCHLORIDE SCH MLS/HR: 1 INJECTION, SOLUTION INTRAVENOUS at 18:38

## 2018-02-23 RX ADMIN — STANDARDIZED SENNA CONCENTRATE AND DOCUSATE SODIUM SCH TAB: 8.6; 5 TABLET, FILM COATED ORAL at 20:58

## 2018-02-23 RX ADMIN — METHADONE HYDROCHLORIDE SCH MG: 10 TABLET ORAL at 08:34

## 2018-02-23 RX ADMIN — VANCOMYCIN HYDROCHLORIDE SCH MLS/HR: 1 INJECTION, SOLUTION INTRAVENOUS at 23:39

## 2018-02-23 RX ADMIN — Medication SCH ML: at 08:34

## 2018-02-23 NOTE — PD.CONS
__________________________________________________


 (Goran Posada MD)





HPI


Consult Requested By





Primary Care Physician


No Primary Care Physician


 


 (Goran Posada MD)


Service


N[facility]


Consult Requested By


Dr. Burleson


Reason for Consult


Hx of vertebral osteo. hx concerning for recurrent infection.  ID also 

consulted. No antibiotics started yet


History of Present Illness


Mr. Curran is a 37 year old male with history of lumbar vertebral osteomyelitis

, IV drug use.  He was last seen by Dr. Nichols in 2016 for osteomyelitis and was 

managed nonoperatively.   He was treated with antibiotics.  The MRI lumbar 

spine in 2016 also showed spondylolisthesis at L4-5, L5-S1.  Patient reports 

following antibiotic treatment his back pain improved.  However he suffers from 

chronic low back pain and left leg sciatic pain that worsens with prolonged 

walking or standing.  He reports of associated paresthesias in his left foot 

and toes.  He reports to be weak all over but denies focal weakness, bowel or 

bladder incontinence.  He returned to ED due to recurrent intractable back 

pain.  Infectious Disease is following the patient.  An MRI of the lumbar spine 

has been completed.  Neurosurgical evaluation was requested.  


 (Simran Milton)





Review of Systems


Constitutional:  DENIES: Chills


Eyes:  DENIES: Vision loss


Ears, nose, mouth, throat:  DENIES: Hearing loss, Vertigo


Respiratory:  DENIES: Apneas, Hemoptysis, Shortness of breath


Cardiovascular:  DENIES: Chest pain


Gastrointestinal:  DENIES: Abdominal pain, Nausea, Vomiting


Genitourinary:  DENIES: Urinary incontinence


Musculoskeletal:  COMPLAINS OF: Stiffness, Back pain


Neurologic:  COMPLAINS OF: Paresthesias, DENIES: Localized weakness, Seizures, 

Speech Problems


Psychiatric:  DENIES: Hallucinations (Simran Milton)





Past Family Social History


Allergies:  


Coded Allergies:  


     *MDRO Multi-Drug Resistant Organism (Verified  Adverse Reaction, Unknown, 2 /5/18)


 MRSA (sputum & blood) - 6/25/13


 MRSA (wound) - 9/29/08


 **MRSA PCR Screen POSITIVE - 6/28/2016**


Past Medical History


Osteomyelitis of lumbar spine September 2016


Mitral valve endocarditis  


Chronic back pain with radiculopathy


IV drug abuse


Past Surgical History


Right tibia fracture repair


Reported Medications


Ibuprofen 800 Mg Tab 800 Mg PO Q6HR PRN


Methadone 30 mg daily


Active Ordered Medications





Current Medications








 Medications


  (Trade)  Dose


 Ordered  Sig/Eduardo


 Route


 PRN Reason  Start Time


 Stop Time Status Last Admin


Dose Admin


 


 Sodium Chloride


  (NS Flush)  2 ml  UNSCH  PRN


 IV FLUSH


 FLUSH AFTER USING IV ACCESS  2/22/18 14:00


     


 


 


 Sodium Chloride


  (NS Flush)  2 ml  BID


 IV FLUSH


   2/22/18 21:00


    2/23/18 08:34


 


 


 Ondansetron HCl


  (Zofran Inj)  4 mg  Q6H  PRN


 IVP


 NAUSEA OR VOMITING  2/22/18 14:00


     


 


 


 Zolpidem Tartrate


  (Ambien)  5 mg  HS  PRN


 PO


 INSOMNIA  2/22/18 21:00


     


 


 


 Naloxone HCl


  (Narcan Inj)  0.4 mg  UNSCH  PRN


 IV PUSH


 SEE LABEL COMMENTS  2/22/18 14:00


     


 


 


 Senna/Docusate


 Sodium


  (Nadya-Colace)  1 tab  BID


 PO


   2/22/18 21:00


    2/23/18 08:32


 


 


 Magnesium


 Hydroxide


  (Milk Of


 Magnesia Liq)  30 ml  Q12H  PRN


 PO


 Mild constipation  2/22/18 14:00


     


 


 


 Sennosides


  (Senokot)  17.2 mg  Q12H  PRN


 PO


 Moderate constipation  2/22/18 14:00


     


 


 


 Bisacodyl


  (Dulcolax Supp)  10 mg  DAILY  PRN


 RECTAL


 SEVERE CONSITIPATION  2/22/18 14:00


     


 


 


 Lactulose


  (Lactulose Liq)  30 ml  DAILY  PRN


 PO


 SEVERE CONSITIPATION  2/22/18 14:00


     


 


 


 Methadone HCl


  (Dolophine)  30 mg  DAILY


 PO


   2/23/18 09:00


    2/23/18 08:34


 


 


 Morphine Sulfate


  (Morphine Inj)  2 mg  Q3H  PRN


 IV PUSH


 Pain 6-10  2/22/18 15:00


     


 


 


 Cyclobenzaprine


 HCl


  (Flexeril)  5 mg  Q8H  PRN


 PO


 MUSCLE SPASM  2/22/18 20:15


    2/22/18 20:44


 


 


 Miscellaneous


  (Pill Splitter)  1 ea  UNSCH  PRN


 OTHER


 SEE LABEL COMMENTS  2/22/18 20:30


     


 


 


 Acetaminophen


  (Tylenol)  500 mg  Q6H  PRN


 PO


 pain 1-6  2/23/18 12:45


   UNV  


 


 


 Pantoprazole


 Sodium


  (Protonix)  40 mg  DAILY


 PO


   2/23/18 12:45


   UNV  


 


 


 Vancomycin HCl


 1000 mg/Sodium


 Chloride  250 ml @ 


 250 mls/hr  ONCE  ONCE


 IV


   2/23/18 13:00


 2/23/18 13:59 UNV  


 


 


 Pharmacy Profile


 Note  0 ml @ 0


 mls/hr  UNSCH


 OTHER


   2/23/18 13:00


   UNV  


 








Family History


 


Father : diabetes and hypertension


Social History


daily tobacco use, rare occasional etoh use, prior history of IV drug use, 

positive use of other illicit drug use 


 (Simran Milton)





Physical Exam


Vital Signs





Vital Signs








  Date Time  Temp Pulse Resp B/P (MAP) Pulse Ox O2 Delivery O2 Flow Rate FiO2


 


2/23/18 08:31 98.0 87 18 119/76 (90) 97   


 


2/23/18 05:14 97.5 76 18 100/61 (74) 97   


 


2/23/18 00:00 98.1 86 18 101/59 (73) 97   


 


2/22/18 20:53 98.7 86 18 128/69 (88) 100   


 


2/22/18 15:57 98.0 75 18 101/66 (78) 99   


 


2/22/18 15:31        


 


2/22/18 15:00  77 19 103/65 (78) 98 Room Air  


 


2/22/18 12:00  78 18 101/60 (74) 98 Room Air  








Laboratory





Laboratory Tests








Test


  2/22/18


09:15 2/23/18


06:52


 


Urine Color YELLOW  


 


Urine Turbidity CLEAR  


 


Urine pH 6.0  


 


Urine Specific Gravity 1.020  


 


Urine Protein TRACE  


 


Urine Glucose (UA) NEG  


 


Urine Ketones NEG  


 


Urine Occult Blood NEG  


 


Urine Nitrite NEG  


 


Urine Bilirubin NEG  


 


Urine Urobilinogen


  GREATER THAN


12.0 


 


 


Urine Leukocyte Esterase NEG  


 


Urine RBC 3  


 


Urine WBC 2  


 


Urine Calcium Oxalate Crystals OCC  


 


Urine Mucus MANY  


 


Microscopic Urinalysis Comment


  CATH-CULT NOT


IND 


 


 


Urine Opiates Screen NEG  


 


Urine Barbiturates Screen NEG  


 


Urine Amphetamines Screen POS  


 


Urine Benzodiazepines Screen NEG  


 


Urine Cocaine Screen NEG  


 


Urine Cannabinoids Screen NEG  


 


White Blood Count  4.4 


 


Red Blood Count  2.72 


 


Hemoglobin  7.8 


 


Hematocrit  23.1 


 


Mean Corpuscular Volume  84.7 


 


Mean Corpuscular Hemoglobin  28.7 


 


Mean Corpuscular Hemoglobin


Concent 


  33.9 


 


 


Red Cell Distribution Width  14.9 


 


Platelet Count  78 


 


Mean Platelet Volume  8.4 


 


Neutrophils (%) (Auto)  78.7 


 


Lymphocytes (%) (Auto)  12.4 


 


Monocytes (%) (Auto)  7.7 


 


Eosinophils (%) (Auto)  1.0 


 


Basophils (%) (Auto)  0.2 


 


Neutrophils # (Auto)  3.5 


 


Lymphocytes # (Auto)  0.5 


 


Monocytes # (Auto)  0.3 


 


Eosinophils # (Auto)  0.0 


 


Basophils # (Auto)  0.0 


 


CBC Comment  AUTO DIFF 


 


Blood Urea Nitrogen  12 


 


Creatinine  0.75 


 


Random Glucose  135 


 


Calcium Level  7.8 


 


Sodium Level  138 


 


Potassium Level  3.7 


 


Chloride Level  106 


 


Carbon Dioxide Level  27.7 


 


Anion Gap  4 


 


Estimat Glomerular Filtration


Rate 


  118 


 














 Date/Time


Source Procedure


Growth Status


 


 


 2/22/18 22:15


Blood Peripheral Aerobic Blood Culture


Pending Received


 


 2/22/18 22:15


Blood Peripheral Anaerobic Blood Culture


Pending Received








 (Goran Posada MD)


Physical Exam


General: Mr. Curran is comfortable, in no obvious distress during examination. 





Neuro: Awake, alert and oriented to person, place, and time. Speech is clear 

and fluent.  Can follow single and multi-step commands without apraxia.  

Cranial nerve examination: pupils to be equal, round, and reactive to light. 

Extra-ocular movements are intact with normal convergence. Facial motor and 

sensory function are normal and symmetrical. Gross hearing is intact, 

bilaterally, to finger rub. The uvula is midline and elevates symmetrically 

with the soft palate. Sternocleidomastoid and deltoid muscles have normal and 

symmetrical strength. Other cranial nerves are intact.





HENT: Normocephalic, atraumatic.  Gross hearing intact bilaterally.  





Eyes:  Pupils equal round. Extra-ocular movement intact. Nonicteric sclera.  





Neck: soft, supple, normal range of motion without pain  





Musculoskeletal:   5/5 in all muscle groups of both upper extremities including 

deltoid, biceps, triceps and .  In the lower extremities, strength is 5/5 

in both iliopsoas, quadriceps, hamstrings, tibialis anterior, gastrocnemius, 

and extensor hallucis longus.  





Sensory examination is decreased to left distal L5, S1 distribution, otherwise 

intact light touch in both the upper and lower extremities





Deep tendon reflexes are 2+ biceps, triceps, and brachioradialis, bilaterally, 

in the upper extremities. In the lower extremities, the patellar are 1+ and 

Achilles are trace to 1+, bilaterally.  There is a bilateral plantar flexion 

response. Hoffmanns sign is negative. There is no ankle clonus.





Cerebellar: intact finger to nose bilaterally





Lungs: clear, nonlabored breathing, no wheezing





Heart: regular rate rhythm





Skin: warm and dry, no cyanosis. 


 (Simran Milton)


Result Diagram:  


2/23/18 0652                                                                   

             2/23/18 0652





Imaging





Last Impressions








Chest X-Ray 2/22/18 0731 Signed





Impressions: 





 Service Date/Time:  Thursday, February 22, 2018 07:48 - CONCLUSION:  1. 

Minimal 





 basilar atelectasis on the left. The lungs are otherwise clear.     Jared Padron MD 


 


Tumor Localization 2/22/18 0000 Signed





Impressions: 





 Service Date/Time:  Thursday, February 22, 2018 16:25 - CONCLUSION: Normal 





 examination.       Sheldon Spears MD 


 


Lumbar Spine MRI 2/22/18 0000 Signed





Impressions: 





 Service Date/Time:  Thursday, February 22, 2018 09:36 - CONCLUSION:  1. 

Evidence 





 of interval development of a superior right parasagittal extrusion of the disc 





 at the L4-5 level with enhancement in the extruded fragment.  There is 

extension 





 into the neural foramen on right side with neural impingement.  There is no 





 significant deformity of the thecal sac and.. 2. Stable broad-based bulging of 





 the L5-S1 disc and impingement of neural foraminal side. 3. Mild enhancement 

in 





 the posterior soft tissues of the lower lumbar region without discrete abscess 





 formation.     Ken Sykes MD 








 (Simran Milton)





Assessment and Plan


Assessment and Plan


 


 (Simran Milton)





Attending Statement


 neuro checks. Non operative treatment. Consider CT guided biopsy and possible 

antibiotics





Sed rate and CRP





No indication for surgery at this time





Pulmonary.. Continue aggressive pulmonary toilette, nasotracheal suction, and 

breathing treatments with nebulizers.





Nutrition. NPO





Renal. monitor closely urine output, BUN and creatinine





Endocrine. Monitor serial Acu checks and SSI as needed in detail





ID monitor for signs of infection





Protonix for stress ulcer prophylaxis





Padilla hose and SCD's for DVT prophylaxis








The exam, history, and the medical decision-making described in the above note 

were completed with the assistance of the mid-level provider. I reviewed and 

agree with the findings presented.  I attest that I had a face-to-face 

encounter with the patient on the same day, and personally performed and 

documented my assessment and findings in the medical record.


 (Goran Posada MD)


 


 (Simran Milton)











Goran Posada MD Feb 23, 2018 09:03


Simran Milton Feb 23, 2018 10:27

## 2018-02-23 NOTE — HHI.IDPN
Subjective


Subjective


Remarks


Mr. 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 

presents with a three-week history of acute low back pain and subjective 

fevers. He does have chronic low back pain but reports an increase in the 

intensity of pain over last 3 weeks. This pain is worsened with movement and is 

relieved with ibuprofen.  Patient reports subjective fevers denies any chills 

is unsure about night sweats.  For work, he climbs trees and cuts limbs.  





Patient reports history of trauma with a tree limb that fell on him.


He reports nausea but no vomiting or diarrhea


Patient denies any bowel bladder incontinence.  He does report a change in his 

stream of urine for the last 3 weeks but denies any incontinence.


Patient denies any loss of sensation in the genitourinary area.


Patient denies any lower extremity or upper extremity weakness at the present 

time.


Patient denies any cardiorespiratory symptoms at the present time.


Patient reports that he has chronic numbness and tingling in his feet 

bilaterally but this is not new.


Patient denies any headache change in vision.


He denies any IV drug abuse but admits to doing oral or snorting medications.  

He reports taking methadone 30 mg per day.


He also reports snorting methamphetamine 2-3 times a week for the past 1-2 

months.





Due to his presenting symptoms and prior history and MRI of the spine was done 

which shows soft tissue density in the lumbar region but no discrete abscess.  

No obvious vertebral bone involvement or obvious fluid collection that can be 

drained at the present time.  Blood cultures have been drawn.  A 2-D echo is 

pending.  I discussed the case with Dr. Burleson and requested that no 

antibiotics be given to the patient unless the patient has positive cultures or 

obvious signs of sepsis.  I would like a WBC scan to be done to decide the 

further course of action.





Notes reviewed


Low grade temps


Back pain same


Constipated


Voiding ok


BC (+) GPC in pairs and chains


ESR 51


Antibiotics





Current Medications











 Medications


  (Trade)  Dose


 Ordered  Sig/Eduardo


 Route  Start Time


 Stop Time Status Last Admin


 


  (NS Flush)  2 ml  UNSCH  PRN


 IV FLUSH  2/22/18 14:00


     


 


 


  (NS Flush)  2 ml  BID


 IV FLUSH  2/22/18 21:00


    2/23/18 08:34


 


 


  (Zofran Inj)  4 mg  Q6H  PRN


 IVP  2/22/18 14:00


     


 


 


  (Ambien)  5 mg  HS  PRN


 PO  2/22/18 21:00


     


 


 


  (Narcan Inj)  0.4 mg  UNSCH  PRN


 IV PUSH  2/22/18 14:00


     


 


 


  (Nadya-Colace)  1 tab  BID


 PO  2/22/18 21:00


    2/23/18 08:32


 


 


  (Milk Of


 Magnesia Liq)  30 ml  Q12H  PRN


 PO  2/22/18 14:00


     


 


 


  (Senokot)  17.2 mg  Q12H  PRN


 PO  2/22/18 14:00


     


 


 


  (Dulcolax Supp)  10 mg  DAILY  PRN


 RECTAL  2/22/18 14:00


     


 


 


  (Lactulose Liq)  30 ml  DAILY  PRN


 PO  2/22/18 14:00


     


 


 


  (Dolophine)  30 mg  DAILY


 PO  2/23/18 09:00


    2/23/18 08:34


 


 


  (Morphine Inj)  2 mg  Q3H  PRN


 IV PUSH  2/22/18 15:00


     


 


 


  (Motrin)  600 mg  Q8H  PRN


 PO  2/22/18 20:15


    2/22/18 20:43


 


 


  (Flexeril)  5 mg  Q8H  PRN


 PO  2/22/18 20:15


    2/22/18 20:44


 


 


  (Pill Splitter)  1 ea  UNSCH  PRN


 OTHER  2/22/18 20:30


     


 








Lines


PIV


Past Medical History


Mitral valve endocarditis in September 2016


Vertebral osteomyelitis in July 2016


Chronic back pain


Polysubstance IV drug abuse


Tobacco abuse


Past Surgical History


Right tibia fracture repair with plates


Allergies:  


Coded Allergies:  


     *MDRO Multi-Drug Resistant Organism (Verified  Adverse Reaction, Unknown, 2 /5/18)


 MRSA (sputum & blood) - 6/25/13


 MRSA (wound) - 9/29/08


 **MRSA PCR Screen POSITIVE - 6/28/2016**





Objective


.





Vital Signs








  Date Time  Temp Pulse Resp B/P (MAP) Pulse Ox O2 Delivery O2 Flow Rate FiO2


 


2/23/18 12:08 100.2 105 18 113/68 (83) 99   


 


2/23/18 08:31 98.0 87 18 119/76 (90) 97   


 


2/23/18 05:14 97.5 76 18 100/61 (74) 97   


 


2/23/18 00:00 98.1 86 18 101/59 (73) 97   


 


2/22/18 20:53 98.7 86 18 128/69 (88) 100   


 


2/22/18 15:57 98.0 75 18 101/66 (78) 99   


 


2/22/18 15:31        


 


2/22/18 15:00  77 19 103/65 (78) 98 Room Air  








.





Laboratory Tests








Test


  2/22/18


07:45 2/23/18


06:52


 


White Blood Count 5.2 TH/MM3  4.4 TH/MM3 


 


Red Blood Count 2.85 MIL/MM3  2.72 MIL/MM3 


 


Hemoglobin 8.0 GM/DL  7.8 GM/DL 


 


Hematocrit 23.7 %  23.1 % 


 


Mean Corpuscular Volume 83.2 FL  84.7 FL 


 


Mean Corpuscular Hemoglobin 28.2 PG  28.7 PG 


 


Mean Corpuscular Hemoglobin


Concent 33.9 % 


  33.9 % 


 


 


Red Cell Distribution Width 14.7 %  14.9 % 


 


Platelet Count 89 TH/MM3  78 TH/MM3 


 


Mean Platelet Volume 8.4 FL  8.4 FL 


 


Neutrophils (%) (Auto) 75.5 %  78.7 % 


 


Lymphocytes (%) (Auto) 12.8 %  12.4 % 


 


Monocytes (%) (Auto) 10.9 %  7.7 % 


 


Eosinophils (%) (Auto) 0.4 %  1.0 % 


 


Basophils (%) (Auto) 0.4 %  0.2 % 


 


Neutrophils # (Auto) 4.0 TH/MM3  3.5 TH/MM3 


 


Lymphocytes # (Auto) 0.7 TH/MM3  0.5 TH/MM3 


 


Monocytes # (Auto) 0.6 TH/MM3  0.3 TH/MM3 


 


Eosinophils # (Auto) 0.0 TH/MM3  0.0 TH/MM3 


 


Basophils # (Auto) 0.0 TH/MM3  0.0 TH/MM3 


 


CBC Comment AUTO DIFF  AUTO DIFF 


 


Differential Total Cells


Counted 100 


  100 


 


 


Neutrophils % (Manual) 72 %  65 % 


 


Band Neutrophils % 14 %  18 % 


 


Lymphocytes % 7 %  12 % 


 


Monocytes % 7 %  5 % 


 


Neutrophils # (Manual) 4.5 TH/MM3  3.7 TH/MM3 


 


Differential Comment


  FINAL DIFF


MANUAL FINAL DIFF


MANUAL


 


Toxic Vacuolation PRESENT  


 


Platelet Estimate LOW  LOW 


 


Platelet Morphology Comment NORMAL  NORMAL 


 


Erythrocyte Sedimentation Rate 51 mm/hr  


 


Red Cell Morphology Comment  NORMAL 








Laboratory Tests








Test


  2/22/18


07:45 2/23/18


06:52


 


Blood Urea Nitrogen 14 MG/DL  12 MG/DL 


 


Creatinine 0.70 MG/DL  0.75 MG/DL 


 


Random Glucose 96 MG/DL  135 MG/DL 


 


Total Protein 6.1 GM/DL  


 


Albumin 1.8 GM/DL  


 


Calcium Level 7.6 MG/DL  7.8 MG/DL 


 


Alkaline Phosphatase 157 U/L  


 


Aspartate Amino Transf


(AST/SGOT) 78 U/L 


  


 


 


Alanine Aminotransferase


(ALT/SGPT) 57 U/L 


  


 


 


Total Bilirubin 0.8 MG/DL  


 


Sodium Level 135 MEQ/L  138 MEQ/L 


 


Potassium Level 3.7 MEQ/L  3.7 MEQ/L 


 


Chloride Level 105 MEQ/L  106 MEQ/L 


 


Carbon Dioxide Level 24.1 MEQ/L  27.7 MEQ/L 


 


Anion Gap 6 MEQ/L  4 MEQ/L 


 


Estimat Glomerular Filtration


Rate 128 ML/MIN 


  118 ML/MIN 


 


 


Lactic Acid Level 1.1 mmol/L  


 


Total Creatine Kinase 20 U/L  


 


C-Reactive Protein 7.30 MG/DL  








Microbiology








 Date/Time


Source Procedure


Growth Status


 


 


 2/22/18 22:15


Blood Peripheral Aerobic Blood Culture - Preliminary


NO GROWTH IN 1 DAY Resulted


 


 2/22/18 22:15


Blood Peripheral Anaerobic Blood Culture - Preliminary


NO GROWTH IN 1 DAY Resulted





 2/22/18 22:10


Blood Peripheral Aerobic Blood Culture - Preliminary


NO GROWTH IN 1 DAY Resulted


 


 2/22/18 22:10


Blood Peripheral Anaerobic Blood Culture - Preliminary


NO GROWTH IN 1 DAY Resulted





 2/22/18 07:45


Blood Peripheral Aerobic Blood Culture - Preliminary


Gram Positive Cocci Resulted


 


 2/22/18 07:45 Anaerobic Blood Culture - Preliminary


Gram Positive Cocci Resulted





 2/22/18 07:40


Blood Peripheral Aerobic Blood Culture - Preliminary


Gram Positive Cocci Resulted


 


 2/22/18 07:40 Anaerobic Blood Culture - Preliminary


Gram Positive Cocci Resulted








Imaging














Chest X-Ray 2/22/18 0731 Signed





Impressions: 





 Service Date/Time:  Thursday, February 22, 2018 07:48 - CONCLUSION:  1. 

Minimal 





 basilar atelectasis on the left. The lungs are otherwise clear.     Jared Padron MD 


 


Tumor Localization 2/22/18 0000 Signed





Impressions: 





 Service Date/Time:  Thursday, February 22, 2018 16:25 - CONCLUSION: Normal 





 examination.       Sheldon Spears MD 


 


Lumbar Spine MRI 2/22/18 0000 Signed





Impressions: 





 Service Date/Time:  Thursday, February 22, 2018 09:36 - CONCLUSION:  1. 

Evidence 





 of interval development of a superior right parasagittal extrusion of the disc 





 at the L4-5 level with enhancement in the extruded fragment.  There is 

extension 





 into the neural foramen on right side with neural impingement.  There is no 





 significant deformity of the thecal sac and.. 2. Stable broad-based bulging of 





 the L5-S1 disc and impingement of neural foraminal side. 3. Mild enhancement 

in 





 the posterior soft tissues of the lower lumbar region without discrete abscess 





 formation.     Ken Sykes MD 








Physical Exam


GENERAL:   Thin built, awake and alert, NAD


SKIN: Dirt and mud stuck on his palms, fingers.  No rash 


HEAD: Atraumatic. Normocephalic. No temporal or scalp tenderness.


EYES: Pupils equal round and reactive. Extraocular motions intact. No scleral 

icterus. No injection or drainage. 


ENT: Nose without bleeding, purulent drainage or septal hematoma. Moist mucosa


NECK: Trachea midline. Supple, nontender, no meningeal signs.


CARDIOVASCULAR: HS audible.


RESPIRATORY: Clear to auscultation. Breath sounds equal bilaterally. No wheezes

, rales, or rhonchi.  


GASTROINTESTINAL: Abdomen soft, non-tender, nondistended. No hepato-splenomegaly

, or palpable masses. No guarding.


MUSCULOSKELETAL: Extremities without clubbing, cyanosis, or edema. No joint 

tenderness, effusion, or edema noted. No calf tenderness. Negative Homans sign 

bilaterally.


Back: small area of swelling in the soft tissue in the lumbar region with 

tenderness but no erythema.


NEUROLOGICAL: Awake and alert. Cranial nerves II through XII intact.  Motor and 

sensory grossly within normal limits. Five out of 5 muscle strength in all 

muscle groups.  Normal speech. 


Psych cooperative


IV line sites with no e.o infection.





Assessment & Plan


Remarks


Assessment and Plan


Sepsis,  (+) BC


Possible early discitis


Soft tissue swelling in the lumbar region possible early infective myositis


Previous history of endocarditis 


   -  concern with new IE


Previous history of discitis and epidural abscess


Fever








Recommendations:


Repeat BC


Echo


Start IV vanco


Follow C/S and adjust Abx


Follow temps


Monitor progress


Further recommendation to follow regarding course of Abx once C/S and work-up 

completed











Peyton Rolon MD Feb 23, 2018 12:57

## 2018-02-23 NOTE — ECHRPT
Indication:   endocarditis

 

 CONCLUSIONS

 

 Mildly dilated left ventricle. 

 The left ventricular systolic function is low normal with an estimated ejection fraction in the rang
e of 50-

 55%. 

 The left atrial size is upper limits of normal. 

 Mild thickening of the mitral valve leaflets. 

 Mild-to-moderate mitral valve regurgitation. 

 The mitral valve regurgitation jet is directed anteriorly. 

 No mitral valve stenosis. 

 There is a moderate-to-large  sized mobile echodensity (0.7 x 1.4 cm) is located on the anterior litzy
ral valve 

 leaflet. 

 

 No aortic valve stenosis. 

 No aortic valve regurgitation. 

 

 There is mild tricuspid valve regurgitation. 

 The estimated pulmonary arterial pressure is 30.1 mmHg. 

 There is a moderate sized mobile echodensity on the the tricuspid valve. 

 The pulmonary valve is not well visualized.  

 

 BP:        /         HR:                          Rhythm:

 

 MEASUREMENTS  (Male / Female) Normal Values       Technical Quality:Good

 2D ECHO

 LV Diastolic Diameter PLAX        5.9 cm                4.2 - 5.9 / 3.9 - 5.3 cm

 LV Systolic Diameter PLAX         4.5 cm                

 IVS Diastolic Thickness           0.7 cm                0.6 - 1.0 / 0.6 - 0.9 cm

 LVPW Diastolic Thickness          1.2 cm                0.6 - 1.0 / 0.6 - 0.9 cm

 LV Relative Wall Thickness        0.3                   

 RV Internal Dim ED PLAX           3.2 cm                

 

 M-MODE

 Aortic Root Diameter MM           3.8 cm                

 LA Systolic Diameter MM           4.2 cm                

 LA Ao Ratio MM                    1.1                   

 AV Cusp Separation MM             2.7 cm                

 

 DOPPLER

 Mitral E Point Velocity           93.8 cm/s             

 Mitral A Point Velocity           81.4 cm/s             

 Mitral E to A Ratio               1.2                   

 LV E' Lateral Velocity            5.6 cm/s              

 Mitral E to LV E' Lateral Ratio   16.9                  

 LV E' Septal Velocity             5.9 cm/s              

 Mitral E to LV E' Septal Ratio    16.0                  

 TR Peak Velocity                  224.0 cm/s            

 TR Peak Gradient                  20.1 mmHg             

 Right Atrial Pressure             10.0 mmHg             

 Pulmonary Artery Systolic Pressu  30.1 mmHg             

 Right Ventricular Systolic Press  30.1 mmHg             

 

 

 FINDINGS

 

 LEFT VENTRICLE

 Mildly dilated left ventricle. 

 The left ventricular systolic function is low normal with an estimated ejection fraction in the rang
e of 50-

 55%. 

 

 RIGHT VENTRICLE

 Normal right ventricular size and systolic function.  

 

 LEFT ATRIUM

 The left atrial size is upper limits of normal. 

 

 RIGHT ATRIUM

 The right atrial size is normal.  

 

 ATRIAL SEPTUM

 Normal atrial septal thickness without atrial level shunting by limited color doppler interrogation.
  

 

 AORTA

 The aortic root and proximal ascending aorta are normal in size on limited imaging.  

 

 MITRAL VALVE

 Mild thickening of the mitral valve leaflets. 

 Mild-to-moderate mitral valve regurgitation. 

 The mitral valve regurgitation jet is directed anteriorly. 

 No mitral valve stenosis. 

 There is a moderate-to-large  sized mobile echodensity (0.7 x 1.4 cm) is located on the anterior litzy
ral valve 

 leaflet. 

 

 AORTIC VALVE

 Trileaflet aortic valve. 

 No aortic valve stenosis. 

 No aortic valve regurgitation. 

 

 TRICUSPID VALVE

 Structurally normal tricuspid valve. 

 There is mild tricuspid valve regurgitation. 

 The estimated pulmonary arterial pressure is 30.1 mmHg. 

 There is a moderate sized mobile echodensity on the the tricuspid valve. 

 

 PULMONARY VALVE

 The pulmonary valve is not well visualized.  

 

 VESSELS

 The inferior vena cava is normal in size.  

 

 PERICARDIUM

 No pericardial effusion.  

 

 

 

 

  Héctor Spaulding MD, FACC

  (Electronically Signed)

  Final Date:23 February 2018 12:55
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
## 2018-02-23 NOTE — RADRPT
EXAM DATE/TIME:  2018 16:25 

 

[facility] COMPARISON:     

CT ABDOMEN & PELVIS W CONTRAST, 2016, 13:06.

 

 

INDICATIONS :     

Back pain for 3 weeks. Abscess.

                       

 

DOSE:      

20.3 mCi Tc99m Ceretec labeled white blood cells IV 

                       

 

PLANAR IMAGIN min, 3 hrs, 20 hrs 

                       

 

MEDICAL HISTORY :     

Hepatitis C. Hypertension.  

 

SURGICAL HISTORY :          

Right tiba surgery.

 

ENCOUNTER:     

Initial

 

ACUITY:     

3 weeks

 

PAIN SCALE:     

5/10

 

LOCATION:        

Back.

 

TECHNIQUE:     

Following the in vitro labeling of autologous white cells and reinjection, whole body scan was perfor
med at specified times.

 

FINDINGS:     

There is no abnormal biodistribution of radiotracer.

 

CONCLUSION:     Normal examination.  

 

 

 

 Sheldon Spears MD on 2018 at 9:27           

Board Certified Radiologist.

 This report was verified electronically.

## 2018-02-24 VITALS
DIASTOLIC BLOOD PRESSURE: 60 MMHG | OXYGEN SATURATION: 97 % | HEART RATE: 94 BPM | TEMPERATURE: 99 F | SYSTOLIC BLOOD PRESSURE: 103 MMHG | RESPIRATION RATE: 17 BRPM

## 2018-02-24 VITALS
TEMPERATURE: 99 F | DIASTOLIC BLOOD PRESSURE: 70 MMHG | SYSTOLIC BLOOD PRESSURE: 109 MMHG | HEART RATE: 94 BPM | RESPIRATION RATE: 18 BRPM | OXYGEN SATURATION: 97 %

## 2018-02-24 VITALS
SYSTOLIC BLOOD PRESSURE: 106 MMHG | HEART RATE: 97 BPM | OXYGEN SATURATION: 97 % | RESPIRATION RATE: 18 BRPM | TEMPERATURE: 99.6 F | DIASTOLIC BLOOD PRESSURE: 67 MMHG

## 2018-02-24 VITALS
TEMPERATURE: 98.9 F | RESPIRATION RATE: 18 BRPM | SYSTOLIC BLOOD PRESSURE: 107 MMHG | OXYGEN SATURATION: 97 % | DIASTOLIC BLOOD PRESSURE: 62 MMHG | HEART RATE: 92 BPM

## 2018-02-24 VITALS
OXYGEN SATURATION: 100 % | SYSTOLIC BLOOD PRESSURE: 106 MMHG | RESPIRATION RATE: 18 BRPM | DIASTOLIC BLOOD PRESSURE: 67 MMHG | TEMPERATURE: 99.2 F | HEART RATE: 98 BPM

## 2018-02-24 LAB
BUN SERPL-MCNC: 11 MG/DL (ref 7–18)
CALCIUM SERPL-MCNC: 7.4 MG/DL (ref 8.5–10.1)
CALCIUM TP COR SERPL-MCNC: 8.1 MG/DL (ref 8.5–10.1)
CHLORIDE SERPL-SCNC: 103 MEQ/L (ref 98–107)
CREAT SERPL-MCNC: 0.71 MG/DL (ref 0.6–1.3)
ERYTHROCYTE [DISTWIDTH] IN BLOOD BY AUTOMATED COUNT: 14.7 % (ref 11.6–17.2)
GFR SERPLBLD BASED ON 1.73 SQ M-ARVRAT: 126 ML/MIN (ref 89–?)
GLUCOSE SERPL-MCNC: 125 MG/DL (ref 74–106)
HCO3 BLD-SCNC: 24.3 MEQ/L (ref 21–32)
HCT VFR BLD CALC: 21.6 % (ref 39–51)
HGB BLD-MCNC: 7.5 GM/DL (ref 13–17)
LYMPHOCYTES: 14 % (ref 9–44)
MCH RBC QN AUTO: 28.6 PG (ref 27–34)
MCHC RBC AUTO-ENTMCNC: 34.6 % (ref 32–36)
MCV RBC AUTO: 82.8 FL (ref 80–100)
MONOCYTES: 9 % (ref 0–8)
NEUTS BAND # BLD MANUAL: 3.6 TH/MM3 (ref 1.8–7.7)
NEUTS BAND NFR BLD: 8 % (ref 0–6)
NEUTS SEG NFR BLD MANUAL: 69 % (ref 16–70)
PLATELET # BLD: 81 TH/MM3 (ref 150–450)
PMV BLD AUTO: 8.9 FL (ref 7–11)
PROT SERPL-MCNC: 5.9 GM/DL (ref 6.4–8.2)
RBC # BLD AUTO: 2.61 MIL/MM3 (ref 4.5–5.9)
SODIUM SERPL-SCNC: 134 MEQ/L (ref 136–145)
TOXIC GRANULES BLD QL SMEAR: (no result)
WBC # BLD AUTO: 4.7 TH/MM3 (ref 4–11)

## 2018-02-24 RX ADMIN — PANTOPRAZOLE SCH MG: 40 TABLET, DELAYED RELEASE ORAL at 08:39

## 2018-02-24 RX ADMIN — METHADONE HYDROCHLORIDE SCH MG: 10 TABLET ORAL at 08:39

## 2018-02-24 RX ADMIN — Medication SCH ML: at 08:39

## 2018-02-24 RX ADMIN — GENTAMICIN SULFATE SCH MLS/HR: 0.8 INJECTION, SOLUTION INTRAVENOUS at 15:02

## 2018-02-24 RX ADMIN — VANCOMYCIN HYDROCHLORIDE SCH MLS/HR: 1 INJECTION, SOLUTION INTRAVENOUS at 08:39

## 2018-02-24 RX ADMIN — STANDARDIZED SENNA CONCENTRATE AND DOCUSATE SODIUM SCH TAB: 8.6; 5 TABLET, FILM COATED ORAL at 08:39

## 2018-02-24 RX ADMIN — AMPICILLIN SODIUM SCH MLS/HR: 2 INJECTION, POWDER, FOR SOLUTION INTRAMUSCULAR; INTRAVENOUS at 22:15

## 2018-02-24 RX ADMIN — VANCOMYCIN HYDROCHLORIDE SCH MLS/HR: 1 INJECTION, SOLUTION INTRAVENOUS at 16:11

## 2018-02-24 RX ADMIN — GENTAMICIN SULFATE SCH MLS/HR: 0.8 INJECTION, SOLUTION INTRAVENOUS at 23:25

## 2018-02-24 RX ADMIN — AMPICILLIN SODIUM SCH MLS/HR: 2 INJECTION, POWDER, FOR SOLUTION INTRAMUSCULAR; INTRAVENOUS at 14:44

## 2018-02-24 RX ADMIN — Medication SCH ML: at 22:15

## 2018-02-24 RX ADMIN — STANDARDIZED SENNA CONCENTRATE AND DOCUSATE SODIUM SCH TAB: 8.6; 5 TABLET, FILM COATED ORAL at 21:00

## 2018-02-24 RX ADMIN — AMPICILLIN SODIUM SCH MLS/HR: 2 INJECTION, POWDER, FOR SOLUTION INTRAMUSCULAR; INTRAVENOUS at 17:28

## 2018-02-24 NOTE — HHI.IDPN
Note


Infectious Disease Note





ID COVERAGE:


Delayed entry. Patient seen at ~ 1:30P





Patient noted back pain.


Sitting up in chair wearing back brace.


Denies chills. Says he feels weak. 


No sweats.





Blood culture has Strep species.


D/W Dr. Gottlieb earlier in the day. 





2D ECHO has mobile density on tricuspid valve. 





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 

baumannii), vertebral osteomyelitis (June 2016), chronic pain presents with a 

three-week history of acute low back pain and subjective fevers. He does have 

chronic low back pain but reports an increase in the intensity of pain over 

last 3 weeks. This pain is worsened with movement and is relieved with 

ibuprofen.  Patient reports subjective fevers denies any chills is unsure about 

night sweats.  For work, he climbs trees and cuts limbs.  





He denies any IV drug abuse but admits to doing oral or snorting medications.  

He reports taking methadone 30 mg per day.


He also reports snorting methamphetamine 2-3 times a week for the past 1-2 

months.





Due to his presenting symptoms and prior history and MRI of the spine was done 

which shows soft tissue density in the lumbar region but no discrete abscess.  

No obvious vertebral bone involvement or obvious fluid collection that can be 

drained at the present time.  








Current Medications








 Medications


  (Trade)  Dose


 Ordered  Sig/Eduardo


 Route


 PRN Reason  Start Time


 Stop Time Status Last Admin


Dose Admin


 


 Sodium Chloride


  (NS Flush)  2 ml  UNSCH  PRN


 IV FLUSH


 FLUSH AFTER USING IV ACCESS  2/22/18 14:00


     


 


 


 Sodium Chloride


  (NS Flush)  2 ml  BID


 IV FLUSH


   2/22/18 21:00


    2/24/18 08:39


 


 


 Ondansetron HCl


  (Zofran Inj)  4 mg  Q6H  PRN


 IVP


 NAUSEA OR VOMITING  2/22/18 14:00


     


 


 


 Zolpidem Tartrate


  (Ambien)  5 mg  HS  PRN


 PO


 INSOMNIA  2/22/18 21:00


     


 


 


 Naloxone HCl


  (Narcan Inj)  0.4 mg  UNSCH  PRN


 IV PUSH


 SEE LABEL COMMENTS  2/22/18 14:00


     


 


 


 Senna/Docusate


 Sodium


  (Nadya-Colace)  1 tab  BID


 PO


   2/22/18 21:00


    2/24/18 08:39


 


 


 Magnesium


 Hydroxide


  (Milk Of


 Magnesia Liq)  30 ml  Q12H  PRN


 PO


 Mild constipation  2/22/18 14:00


     


 


 


 Sennosides


  (Senokot)  17.2 mg  Q12H  PRN


 PO


 Moderate constipation  2/22/18 14:00


     


 


 


 Bisacodyl


  (Dulcolax Supp)  10 mg  DAILY  PRN


 RECTAL


 SEVERE CONSITIPATION  2/22/18 14:00


     


 


 


 Lactulose


  (Lactulose Liq)  30 ml  DAILY  PRN


 PO


 SEVERE CONSITIPATION  2/22/18 14:00


     


 


 


 Methadone HCl


  (Dolophine)  30 mg  DAILY


 PO


   2/23/18 09:00


    2/24/18 08:39


 


 


 Morphine Sulfate


  (Morphine Inj)  2 mg  Q3H  PRN


 IV PUSH


 Pain 6-10  2/22/18 15:00


     


 


 


 Cyclobenzaprine


 HCl


  (Flexeril)  5 mg  Q8H  PRN


 PO


 MUSCLE SPASM  2/22/18 20:15


    2/23/18 23:37


 


 


 Miscellaneous


  (Pill Splitter)  1 ea  UNSCH  PRN


 OTHER


 SEE LABEL COMMENTS  2/22/18 20:30


     


 


 


 Acetaminophen


  (Tylenol)  500 mg  Q6H  PRN


 PO


 pain 1-6  2/23/18 12:45


     


 


 


 Pantoprazole


 Sodium


  (Protonix)  40 mg  DAILY


 PO


   2/23/18 14:30


    2/24/18 08:39


 


 


 Pharmacy Profile


 Note  0 ml @ 0


 mls/hr  UNSCH


 OTHER


   2/23/18 13:00


     


 


 


 Ampicillin Sodium


 2000 mg/Sodium


 Chloride  100 ml @ 


 400 mls/hr  Q4H


 IV


   2/24/18 13:00


    2/24/18 17:28


 


 


 Gentamicin


 Sulfate/Sodium


 Chloride  100 ml @ 


 100 mls/hr  Q8H


 IV


   2/24/18 14:00


    2/24/18 15:02


 


 


 Vancomycin HCl


 1250 mg/Sodium


 Chloride  262.5 ml @ 


 250 mls/hr  Q8H


 IV


   2/25/18 00:00


     


 


 


 Miscellaneous


 Information  SPECIFIC LAB TO BE


 DRAWN:VANCO TROUGH


 DATE TO BE DRRussell..  ONCE  ONCE


 .XX


   2/25/18 15:45


 2/25/18 15:46   


 








Lines


PIV


Past Medical History


Mitral valve endocarditis in September 2016


Vertebral osteomyelitis in July 2016


Chronic back pain


Polysubstance IV drug abuse


Tobacco abuse


Past Surgical History


Right tibia fracture repair with plates


Allergies:  


Coded Allergies:  


     *MDRO Multi-Drug Resistant Organism (Verified  Adverse Reaction, Unknown, 2 /5/18)


 MRSA (sputum & blood) - 6/25/13


 MRSA (wound) - 9/29/08


 **MRSA PCR Screen POSITIVE - 6/28/2016**





OBJECTIVE:











Vital Signs








  Date Time  Temp Pulse Resp B/P (MAP) Pulse Ox O2 Delivery O2 Flow Rate FiO2


 


2/24/18 16:00 99.2 98 18 106/67 (80) 100   


 


2/24/18 12:00 98.9 92 18 107/62 (77) 97   


 


2/24/18 10:00   16     


 


2/24/18 08:00 99.0 94 18 109/70 (83) 97   


 


2/24/18 04:53 99.6 97 18 106/67 (80) 97   


 


2/23/18 23:42 99.2 95 16 119/62 (81) 97   











Laboratory Tests








Test


  2/23/18


06:52 2/24/18


09:00


 


White Blood Count 4.4 TH/MM3  4.7 TH/MM3 


 


Red Blood Count 2.72 MIL/MM3  2.61 MIL/MM3 


 


Hemoglobin 7.8 GM/DL  7.5 GM/DL 


 


Hematocrit 23.1 %  21.6 % 


 


Mean Corpuscular Volume 84.7 FL  82.8 FL 


 


Mean Corpuscular Hemoglobin 28.7 PG  28.6 PG 


 


Mean Corpuscular Hemoglobin


Concent 33.9 % 


  34.6 % 


 


 


Red Cell Distribution Width 14.9 %  14.7 % 


 


Platelet Count 78 TH/MM3  81 TH/MM3 


 


Mean Platelet Volume 8.4 FL  8.9 FL 


 


Neutrophils (%) (Auto) 78.7 %  


 


Lymphocytes (%) (Auto) 12.4 %  


 


Monocytes (%) (Auto) 7.7 %  


 


Eosinophils (%) (Auto) 1.0 %  


 


Basophils (%) (Auto) 0.2 %  


 


Neutrophils # (Auto) 3.5 TH/MM3  


 


Lymphocytes # (Auto) 0.5 TH/MM3  


 


Monocytes # (Auto) 0.3 TH/MM3  


 


Eosinophils # (Auto) 0.0 TH/MM3  


 


Basophils # (Auto) 0.0 TH/MM3  


 


CBC Comment AUTO DIFF  AUTO DIFF 


 


Differential Total Cells


Counted 100 


  100 


 


 


Neutrophils % (Manual) 65 %  69 % 


 


Band Neutrophils % 18 %  8 % 


 


Lymphocytes % 12 %  14 % 


 


Monocytes % 5 %  9 % 


 


Neutrophils # (Manual) 3.7 TH/MM3  3.6 TH/MM3 


 


Differential Comment


  FINAL DIFF


MANUAL FINAL DIFF


MANUAL


 


Platelet Estimate LOW  LOW 


 


Platelet Morphology Comment NORMAL  NORMAL 


 


Red Cell Morphology Comment NORMAL  


 


Toxic Granulation  1+ 








Laboratory Tests








Test


  2/23/18


06:52 2/24/18


09:00


 


Blood Urea Nitrogen 12 MG/DL  11 MG/DL 


 


Creatinine 0.75 MG/DL  0.71 MG/DL 


 


Random Glucose 135 MG/DL  125 MG/DL 


 


Calcium Level 7.8 MG/DL  7.4 MG/DL 


 


Sodium Level 138 MEQ/L  134 MEQ/L 


 


Potassium Level 3.7 MEQ/L  3.5 MEQ/L 


 


Chloride Level 106 MEQ/L  103 MEQ/L 


 


Carbon Dioxide Level 27.7 MEQ/L  24.3 MEQ/L 


 


Anion Gap 4 MEQ/L  7 MEQ/L 


 


Estimat Glomerular Filtration


Rate 118 ML/MIN 


  126 ML/MIN 


 


 


Total Protein  5.9 GM/DL 


 


Protein Corrected Calcium  8.1 MG/DL 








Microbiology








 Date/Time


Source Procedure


Growth Status


 


 


 2/24/18 09:00


Blood Peripheral Aerobic Blood Culture


Pending Received


 


 2/24/18 09:00


Blood Peripheral Anaerobic Blood Culture


Pending Received





 2/23/18 16:19


Blood Peripheral Aerobic Blood Culture - Preliminary


Gram Positive Cocci Resulted


 


 2/23/18 16:19 Anaerobic Blood Culture - Preliminary


Gram Positive Cocci Resulted





 2/22/18 22:15


Blood Peripheral Aerobic Blood Culture - Preliminary


Streptococcus Species Resulted


 


 2/22/18 22:15 Anaerobic Blood Culture - Preliminary


Streptococcus Species Resulted





 2/22/18 22:10


Blood Peripheral Aerobic Blood Culture - Preliminary


Streptococcus Species Resulted


 


 2/22/18 22:10 Anaerobic Blood Culture - Preliminary


Streptococcus Species Resulted





 2/22/18 07:45


Blood Peripheral Aerobic Blood Culture - Preliminary


Streptococcus Species Resulted


 


 2/22/18 07:45 Anaerobic Blood Culture - Preliminary


Streptococcus Species Resulted





 2/22/18 07:40


Blood Peripheral Aerobic Blood Culture - Preliminary


Streptococcus Species Resulted


 


 2/22/18 07:40 Anaerobic Blood Culture - Preliminary


Streptococcus Species Resulted














Imaging














Chest X-Ray 2/22/18 0731 Signed





Impressions: 





 Service Date/Time:  Thursday, February 22, 2018 07:48 - CONCLUSION:  1. 

Minimal 





 basilar atelectasis on the left. The lungs are otherwise clear.     Jared Padron MD 


 


Tumor Localization 2/22/18 0000 Signed





Impressions: 





 Service Date/Time:  Thursday, February 22, 2018 16:25 - CONCLUSION: Normal 





 examination.       Sheldon Spears MD 


 


Lumbar Spine MRI 2/22/18 0000 Signed





Impressions: 





 Service Date/Time:  Thursday, February 22, 2018 09:36 - CONCLUSION:  1. 

Evidence 





 of interval development of a superior right parasagittal extrusion of the disc 





 at the L4-5 level with enhancement in the extruded fragment.  There is 

extension 





 into the neural foramen on right side with neural impingement.  There is no 





 significant deformity of the thecal sac and.. 2. Stable broad-based bulging of 





 the L5-S1 disc and impingement of neural foraminal side. 3. Mild enhancement 

in 





 the posterior soft tissues of the lower lumbar region without discrete abscess 





 formation.     Ken Sykes MD 








Physical Exam


GENERAL:   Thin built, awake and alert, NAD


SKIN:  No rash 


HEENT: Head atraumatic. Normocephalic. No temporal or scalp tenderness.


Pupils equal round and reactive. Extraocular motions intact. No scleral 

icterus. 


No injection or drainage. Moist oropharynx mucosa


NECK: Trachea midline. Supple, nontender, no meningeal signs.


CARDIOVASCULAR: HS audible. (+) Murmur. 3/6 WICHO. 


RESPIRATORY: Clear to auscultation. Breath sounds equal bilaterally. No wheezes

, rales, or rhonchi.  


GASTROINTESTINAL: Abdomen soft, non-tender, nondistended. 


MUSCULOSKELETAL: Extremities without clubbing, cyanosis. (+) edema. 


Back: small area of swelling in the soft tissue in the lumbar region with 

tenderness but no erythema.


NEUROLOGICAL: Awake and alert. Grossly non focal. 


PSYCH:  Cooperative


IV line sites with no e.o infection.








Assessment and Plan


Sepsis,  Strep species. 


Possible early discitis


Soft tissue swelling in the lumbar region possible early infective myositis


Previous history of endocarditis. Has Mobile density on the tricuspid valve. 


   -  concern with new IE


Previous history of discitis and epidural abscess


Fever


Thrombocytopenia..





Recommendations:


Repeat BC


Continue Vancomycin while awaiting blood cultures. 


Begin Ampicillin and monitor the blood cultures. If strep viridans it an be 

changed to Penicillin.


Continue Gentamycin. 


Follow temps


Monitor progress


Consider SCOTTY. 


Monitor the platelet count.











Truman Abreu MD Feb 24, 2018 21:18

## 2018-02-24 NOTE — HHI.FPPN
Subjective


Remarks


Patient states he feels improved from yesterday.  His back is less painful.  He 

denies fever, chills, nausea, vomiting, chest pain, shortness of breath, 

diarrhea.  He understands we have started IV antibiotics and that his blood 

cultures are positive.


 (Oniel Burleson MD, R3)





Objective


Vitals





Vital Signs








  Date Time  Temp Pulse Resp B/P (MAP) Pulse Ox O2 Delivery O2 Flow Rate FiO2


 


2/24/18 04:53 99.6 97 18 106/67 (80) 97   


 


2/23/18 23:42 99.2 95 16 119/62 (81) 97   


 


2/23/18 20:00 99.7 104 18 114/72 (86) 99   


 


2/23/18 16:06 100.6 109 18 117/63 (81) 98   


 


2/23/18 12:08 100.2 105 18 113/68 (83) 99   














I/O      


 


 2/23/18 2/23/18 2/23/18 2/24/18 2/24/18 2/24/18





 07:00 15:00 23:00 07:00 15:00 23:00


 


Intake Total   480 ml 250 ml  


 


Balance   480 ml 250 ml  


 


      


 


Intake Oral   480 ml   


 


IV Total    250 ml  


 


# Voids 0  5 3  








 (Oniel Burleson MD, R3)


Result Diagram:  


2/23/18 0652                                                                   

             2/23/18 0652





Objective Remarks


GENERAL: This is a thin though not cachectic male.  Lying comfortably in bed.  

No acute distress


SKIN: Cool and dry


HEAD: Atraumatic. Normocephalic. 


EYES: Pupils equal round and reactive. Extraocular motions intact. No scleral 

icterus. No injection or drainage. 


ENT: Mostly edentulous.  Nose without bleeding, purulent drainage or septal 

hematoma. Throat without erythema, tonsillar hypertrophy or exudate. Uvula 

midline. Airway patent


CARDIOVASCULAR: 2-3/6 soft systolic ejection murmur at the apex/left sternal 

border.  Regular rate and rhythm


RESPIRATORY: Clear to auscultation. Breath sounds equal bilaterally. No wheezes

, rales, or rhonchi.  


GASTROINTESTINAL: Abdomen soft, non-tender, nondistended. No hepato-splenomegaly

, or palpable masses. No guarding.


MUSCULOSKELETAL: Bilateral 1+ edema in the feet.  No joint tenderness.


Back: slightly swollen, though improved, 4x4 cm TTP soft tissue midline at L4/

L5. No erythema.  No paravertebral muscle tenderness.


NEUROLOGICAL: Awake and alert. Cranial nerves II through XII intact.  Motor and 

sensory grossly within normal limits. Five out of 5 muscle strength in all 

muscle groups.  Normal speech.


 (Oniel Burleson MD, R3)





A/P


Assessment and Plan


37-year-old male with history of IV drug use, endocarditis, vertebral 

osteomyelitis; he presented with acute on chronic low back pain, subjective 

fevers.  Infectious disease consult.  Echo concerning for endocarditis.  

Positive blood cultures.  Treatment as below.


Discharge Planning


Unclear at this time


 (Oniel Burleson MD, R3)


Attending Attestation


Patient seen and examined.  Case reviewed and discussed with the resident team.

  Agree with plan of care as discussed with me and documented in the resident 

note.


sadly, he yet again has endocarditis. he struggles with quitting iv drugs. 

going on methadone helped to stop the iv opiates but he went to meth. will 

continue to urge him to go for long term drug rehab. his habits are life 

threatening. I informed him that forever he is very susceptible to getting 

endocarditis again


 (Sonia Turcios MD)


Problem List:  


(1) Endocarditis, suspected


ICD Codes:  Z03.89 - Encounter for observation for other suspected diseases and 

conditions ruled out


Status:  Acute


Plan:  Echo shows a moderate to large sized mobile echodensity (0.71.4 cm) 

located on the anterior mitral valve leaflet.


Infectious disease consulted.


Positive blood cultures, see below


Vancomycin started





(2) Bacteremia


ICD Codes:  R78.81 - Bacteremia


Status:  Acute


Plan:  Infectious disease consulted


Vancomycin started 2/23


Etiology likely endocarditis


Repeat blood cultures pending





(3) Back pain


ICD Codes:  M54.9 - Dorsalgia, unspecified


Status:  Acute


Plan:  Concern is for recurrent vertebral osteomyelitis.


Consulted infectious disease


Consulted neurosurgery Dr. Nichols (known to patient from 6/2016)


Case discussed with infectious disease and neurosurgery.


antibiotics were not started initially. however, 4/4 blood cultures are 

positive for gram positive cocci


Possible biopsy however any back infection would likely be septic from the 

heart or have led to the endocarditis and may very well be the same organism.  


As patient is bacteremic, antibiotics per ID. starting with vancomycin


Blood cultures pending for final results and 2nd day cultures pending


Continue methadone as below for pain.  Morphine 2 mg IV every 3 hours when 

necessary pain 6-10.





(4) Methadone maintenance therapy patient


ICD Codes:  F11.20 - Opioid dependence, uncomplicated


Status:  Chronic


Plan:  Continue methadone 30 mg daily.





(5) History of drug abuse


ICD Codes:  Z87.898 - Personal history of other specified conditions


Status:  Acute


Plan:  No IV drug use for the last 1 year per the patient is what he said 

initially but then he admitted one time using iv 2 months ago. so many abusers 

are ashamed so it is difficult to be sure of their usage patterns. he is 

already asking about a 


PICC line


He has been doing methamphetamines, snorting


urine drug screen ordered and in the EMR


Counseled cessation





(6) Anemia


ICD Codes:  D64.9 - Anemia, unspecified


Status:  Chronic


Plan:  Hemoccult stool ordered.


Transfuse for hemoglobin less than 7.


On his last admission he was pancytopenic, likely from infection versus 

hepatitis C versus other


This is likely from endocarditis





(7) Thrombocytopenia


ICD Codes:  D69.6 - Thrombocytopenia, unspecified


Status:  Chronic


Plan:  Appears to be chronic


He was pancytopenic at his last admission.


Likely from endocarditis


Holding chemical prophylaxis


Consider hematology consult





(8) Hepatitis B infection


ICD Codes:  B19.10 - Unspecified viral hepatitis B without hepatic coma


Status:  Chronic


Plan:  not new infection. can investigate his status





(9) FEN/DVT PPX/GI PPX/Nursing Orders 


Status:  Acute


Plan:  Fluids: Tolerating by mouth


Electrolytes: Monitor and replace as needed


Nutrition: Regular diet


Prophylaxis: SCDs, holding chemical prophylaxis because of low hemoglobin, 

thrombocytopenia


 (Oniel Burleson MD, R3)





Problem Qualifiers





(1) Back pain:  


Qualified Codes:  M54.5 - Low back pain


(2) Anemia:  


Qualified Codes:  D64.9 - Anemia, unspecified


(3) Hepatitis B infection:  








Oniel Burleson MD, R3 Feb 24, 2018 08:50


Sonia Turcios MD Feb 24, 2018 13:46

## 2018-02-25 VITALS
SYSTOLIC BLOOD PRESSURE: 109 MMHG | DIASTOLIC BLOOD PRESSURE: 65 MMHG | TEMPERATURE: 97.9 F | RESPIRATION RATE: 18 BRPM | OXYGEN SATURATION: 98 % | HEART RATE: 79 BPM

## 2018-02-25 VITALS
DIASTOLIC BLOOD PRESSURE: 61 MMHG | RESPIRATION RATE: 18 BRPM | HEART RATE: 87 BPM | OXYGEN SATURATION: 100 % | SYSTOLIC BLOOD PRESSURE: 103 MMHG | TEMPERATURE: 99.3 F

## 2018-02-25 VITALS
OXYGEN SATURATION: 96 % | TEMPERATURE: 98.8 F | RESPIRATION RATE: 19 BRPM | SYSTOLIC BLOOD PRESSURE: 98 MMHG | HEART RATE: 85 BPM | DIASTOLIC BLOOD PRESSURE: 59 MMHG

## 2018-02-25 VITALS
HEART RATE: 88 BPM | SYSTOLIC BLOOD PRESSURE: 105 MMHG | RESPIRATION RATE: 18 BRPM | DIASTOLIC BLOOD PRESSURE: 60 MMHG | TEMPERATURE: 98.6 F | OXYGEN SATURATION: 96 %

## 2018-02-25 VITALS
TEMPERATURE: 98 F | OXYGEN SATURATION: 98 % | DIASTOLIC BLOOD PRESSURE: 71 MMHG | RESPIRATION RATE: 18 BRPM | HEART RATE: 88 BPM | SYSTOLIC BLOOD PRESSURE: 108 MMHG

## 2018-02-25 LAB
BASOPHILS # BLD AUTO: 0 TH/MM3 (ref 0–0.2)
BASOPHILS NFR BLD: 0.4 % (ref 0–2)
BUN SERPL-MCNC: 11 MG/DL (ref 7–18)
CALCIUM SERPL-MCNC: 7.6 MG/DL (ref 8.5–10.1)
CHLORIDE SERPL-SCNC: 103 MEQ/L (ref 98–107)
CREAT SERPL-MCNC: 0.64 MG/DL (ref 0.6–1.3)
EOSINOPHIL # BLD: 0.1 TH/MM3 (ref 0–0.4)
EOSINOPHIL NFR BLD: 1.9 % (ref 0–4)
ERYTHROCYTE [DISTWIDTH] IN BLOOD BY AUTOMATED COUNT: 14.7 % (ref 11.6–17.2)
GFR SERPLBLD BASED ON 1.73 SQ M-ARVRAT: 142 ML/MIN (ref 89–?)
GLUCOSE SERPL-MCNC: 105 MG/DL (ref 74–106)
HCO3 BLD-SCNC: 25.6 MEQ/L (ref 21–32)
HCT VFR BLD CALC: 21.9 % (ref 39–51)
HGB BLD-MCNC: 7.5 GM/DL (ref 13–17)
LYMPHOCYTES # BLD AUTO: 0.8 TH/MM3 (ref 1–4.8)
LYMPHOCYTES NFR BLD AUTO: 19.5 % (ref 9–44)
MCH RBC QN AUTO: 28.2 PG (ref 27–34)
MCHC RBC AUTO-ENTMCNC: 34 % (ref 32–36)
MCV RBC AUTO: 83 FL (ref 80–100)
MONOCYTE #: 0.5 TH/MM3 (ref 0–0.9)
MONOCYTES NFR BLD: 10.7 % (ref 0–8)
NEUTROPHILS # BLD AUTO: 2.9 TH/MM3 (ref 1.8–7.7)
NEUTROPHILS NFR BLD AUTO: 67.5 % (ref 16–70)
PLATELET # BLD: 98 TH/MM3 (ref 150–450)
PMV BLD AUTO: 8.5 FL (ref 7–11)
RBC # BLD AUTO: 2.64 MIL/MM3 (ref 4.5–5.9)
SODIUM SERPL-SCNC: 134 MEQ/L (ref 136–145)
WBC # BLD AUTO: 4.3 TH/MM3 (ref 4–11)

## 2018-02-25 RX ADMIN — SODIUM CHLORIDE SCH MLS/HR: 900 INJECTION INTRAVENOUS at 07:37

## 2018-02-25 RX ADMIN — AMPICILLIN SODIUM SCH MLS/HR: 2 INJECTION, POWDER, FOR SOLUTION INTRAMUSCULAR; INTRAVENOUS at 01:40

## 2018-02-25 RX ADMIN — CLINDAMYCIN PHOSPHATE SCH MLS/HR: 150 INJECTION, SOLUTION INTRAMUSCULAR; INTRAVENOUS at 22:48

## 2018-02-25 RX ADMIN — PENICILLIN G SODIUM SCH MLS/HR: 5000000 INJECTION, POWDER, FOR SOLUTION INTRAMUSCULAR; INTRAVENOUS at 21:24

## 2018-02-25 RX ADMIN — AMPICILLIN SODIUM SCH MLS/HR: 2 INJECTION, POWDER, FOR SOLUTION INTRAMUSCULAR; INTRAVENOUS at 05:15

## 2018-02-25 RX ADMIN — GENTAMICIN SULFATE SCH MLS/HR: 0.8 INJECTION, SOLUTION INTRAVENOUS at 13:15

## 2018-02-25 RX ADMIN — STANDARDIZED SENNA CONCENTRATE AND DOCUSATE SODIUM SCH TAB: 8.6; 5 TABLET, FILM COATED ORAL at 07:36

## 2018-02-25 RX ADMIN — GENTAMICIN SULFATE SCH MLS/HR: 0.8 INJECTION, SOLUTION INTRAVENOUS at 05:59

## 2018-02-25 RX ADMIN — Medication SCH ML: at 21:25

## 2018-02-25 RX ADMIN — HEPARIN SODIUM SCH UNITS: 10000 INJECTION, SOLUTION INTRAVENOUS; SUBCUTANEOUS at 21:24

## 2018-02-25 RX ADMIN — Medication SCH ML: at 07:36

## 2018-02-25 RX ADMIN — AMPICILLIN SODIUM SCH MLS/HR: 2 INJECTION, POWDER, FOR SOLUTION INTRAMUSCULAR; INTRAVENOUS at 12:16

## 2018-02-25 RX ADMIN — PENICILLIN G SODIUM SCH MLS/HR: 5000000 INJECTION, POWDER, FOR SOLUTION INTRAMUSCULAR; INTRAVENOUS at 17:19

## 2018-02-25 RX ADMIN — SODIUM CHLORIDE SCH MLS/HR: 900 INJECTION INTRAVENOUS at 00:31

## 2018-02-25 RX ADMIN — STANDARDIZED SENNA CONCENTRATE AND DOCUSATE SODIUM SCH TAB: 8.6; 5 TABLET, FILM COATED ORAL at 21:24

## 2018-02-25 RX ADMIN — AMPICILLIN SODIUM SCH MLS/HR: 2 INJECTION, POWDER, FOR SOLUTION INTRAMUSCULAR; INTRAVENOUS at 08:47

## 2018-02-25 RX ADMIN — PANTOPRAZOLE SCH MG: 40 TABLET, DELAYED RELEASE ORAL at 07:36

## 2018-02-25 RX ADMIN — ACETAMINOPHEN PRN MG: 500 TABLET ORAL at 22:52

## 2018-02-25 RX ADMIN — METHADONE HYDROCHLORIDE SCH MG: 10 TABLET ORAL at 07:36

## 2018-02-25 NOTE — RADRPT
EXAM DATE/TIME:  02/25/2018 11:06 

 

[facility] COMPARISON:     

US LEG RIGHT VENOUS DOPPLER, October 03, 2016, 16:48.

        

 

 

INDICATIONS :                

Right leg swelling and pain.

            

 

MEDICAL HISTORY :     

Hepatitis B.  Hepatitis C.    Lumbar osteromyelitis. Head trauma. Migraines. Endocarditis. IBD. Pares
thesia. Substance use. Blood transfusion. MRSA.

 

SURGICAL HISTORY :         

Craniotomy. Right tibia fracture repair.

 

ENCOUNTER:     

Subsequent

 

ACUITY:     

1 day

 

PAIN SCORE:      

7/10

 

LOCATION:      

Right  leg.

            

                      

 

TECHNIQUE:     

Venous ultrasound of the leg was performed from the inguinal ligament to the proximal calf.  Real-bryant
e, color Doppler and spectral tracing, compression and augmentation techniques were used.  

 

FINDINGS:     

There is normal compressibility of the deep venous system from the inguinal region to the proximal ca
lf.  No echogenic clot is seen in the lumen of the common femoral, femoral, popliteal, and posterior 
tibial veins.  There is a normal response of the venous system to proximal and distal augmentation an
d respiration.  

 

CONCLUSION:     No DVT.

 

 

 

 Sheldon Zelaya MD on February 25, 2018 at 11:39           

Board Certified Radiologist.

 This report was verified electronically.

## 2018-02-25 NOTE — HHI.IDPN
Note


Infectious Disease Note





ID COVERAGE:


Patient says he has back pain which is unchanged.


Feels tired.


Afebrile. 


Denies chills. Says he feels weak. 


No sweats.





Blood culture has Strep viridans. 


2D ECHO has mobile density on tricuspid valve. 





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 

baumannii), vertebral osteomyelitis (June 2016), chronic pain presents with a 

three-week history of acute low back pain and subjective fevers. He does have 

chronic low back pain but reports an increase in the intensity of pain over 

last 3 weeks. This pain is worsened with movement and is relieved with 

ibuprofen.  Patient reports subjective fevers denies any chills is unsure about 

night sweats.  For work, he climbs trees and cuts limbs.  





He denies any IV drug abuse but admits to doing oral or snorting medications.  

He reports taking methadone 30 mg per day.


He also reports snorting methamphetamine 2-3 times a week for the past 1-2 

months.





Due to his presenting symptoms and prior history and MRI of the spine was done 

which shows soft tissue density in the lumbar region but no discrete abscess.  

No obvious vertebral bone involvement or obvious fluid collection that can be 

drained at the present time.  








Current Medications








 Medications


  (Trade)  Dose


 Ordered  Sig/Eduardo


 Route


 PRN Reason  Start Time


 Stop Time Status Last Admin


Dose Admin


 


 Sodium Chloride


  (NS Flush)  2 ml  UNSCH  PRN


 IV FLUSH


 FLUSH AFTER USING IV ACCESS  2/22/18 14:00


     


 


 


 Sodium Chloride


  (NS Flush)  2 ml  BID


 IV FLUSH


   2/22/18 21:00


    2/25/18 07:36


 


 


 Ondansetron HCl


  (Zofran Inj)  4 mg  Q6H  PRN


 IVP


 NAUSEA OR VOMITING  2/22/18 14:00


     


 


 


 Zolpidem Tartrate


  (Ambien)  5 mg  HS  PRN


 PO


 INSOMNIA  2/22/18 21:00


     


 


 


 Naloxone HCl


  (Narcan Inj)  0.4 mg  UNSCH  PRN


 IV PUSH


 SEE LABEL COMMENTS  2/22/18 14:00


     


 


 


 Senna/Docusate


 Sodium


  (Nadya-Colace)  1 tab  BID


 PO


   2/22/18 21:00


    2/25/18 07:36


 


 


 Magnesium


 Hydroxide


  (Milk Of


 Magnesia Liq)  30 ml  Q12H  PRN


 PO


 Mild constipation  2/22/18 14:00


     


 


 


 Sennosides


  (Senokot)  17.2 mg  Q12H  PRN


 PO


 Moderate constipation  2/22/18 14:00


     


 


 


 Bisacodyl


  (Dulcolax Supp)  10 mg  DAILY  PRN


 RECTAL


 SEVERE CONSITIPATION  2/22/18 14:00


     


 


 


 Lactulose


  (Lactulose Liq)  30 ml  DAILY  PRN


 PO


 SEVERE CONSITIPATION  2/22/18 14:00


     


 


 


 Methadone HCl


  (Dolophine)  30 mg  DAILY


 PO


   2/23/18 09:00


    2/25/18 07:36


 


 


 Morphine Sulfate


  (Morphine Inj)  2 mg  Q3H  PRN


 IV PUSH


 Pain 6-10  2/22/18 15:00


     


 


 


 Cyclobenzaprine


 HCl


  (Flexeril)  5 mg  Q8H  PRN


 PO


 MUSCLE SPASM  2/22/18 20:15


    2/23/18 23:37


 


 


 Miscellaneous


  (Pill Splitter)  1 ea  UNSCH  PRN


 OTHER


 SEE LABEL COMMENTS  2/22/18 20:30


     


 


 


 Acetaminophen


  (Tylenol)  500 mg  Q6H  PRN


 PO


 pain 1-6  2/23/18 12:45


     


 


 


 Pantoprazole


 Sodium


  (Protonix)  40 mg  DAILY


 PO


   2/23/18 14:30


    2/25/18 07:36


 


 


 Pharmacy Profile


 Note  0 ml @ 0


 mls/hr  UNSCH


 OTHER


   2/23/18 13:00


     


 


 


 Ampicillin Sodium


 2000 mg/Sodium


 Chloride  100 ml @ 


 400 mls/hr  Q4H


 IV


   2/24/18 13:00


    2/25/18 12:16


 


 


 Gentamicin


 Sulfate/Sodium


 Chloride  100 ml @ 


 100 mls/hr  Q8H


 IV


   2/24/18 14:00


    2/25/18 13:15


 


 


 Vancomycin HCl


 1250 mg/Sodium


 Chloride  262.5 ml @ 


 250 mls/hr  Q8H


 IV


   2/25/18 00:00


    2/25/18 07:37


 


 


 Miscellaneous


 Information  SPECIFIC LAB TO BE


 DRAWN:VANCO TROUGH


 DATE TO BE DR...  ONCE  ONCE


 .XX


   2/25/18 15:45


 2/25/18 15:46   


 


 


 Heparin Sodium


  (Porcine)


  (Heparin Inj)  5,000 units  Q12HR


 SQ


   2/25/18 21:00


     


 











Lines


PIV


Past Medical History


Mitral valve endocarditis in September 2016


Vertebral osteomyelitis in July 2016


Chronic back pain


Polysubstance IV drug abuse


Tobacco abuse


Past Surgical History


Right tibia fracture repair with plates


Allergies:  


Coded Allergies:  


     *MDRO Multi-Drug Resistant Organism (Verified  Adverse Reaction, Unknown, 2 /5/18)


 MRSA (sputum & blood) - 6/25/13


 MRSA (wound) - 9/29/08


 **MRSA PCR Screen POSITIVE - 6/28/2016**





OBJECTIVE:








Vital Signs








  Date Time  Temp Pulse Resp B/P (MAP) Pulse Ox O2 Delivery O2 Flow Rate FiO2


 


2/25/18 12:00 98.0 88 18 108/71 (83) 98   


 


2/25/18 08:00 97.9 79 18 109/65 (80) 98   


 


2/25/18 05:30 98.6 88 18 105/60 (75) 96   


 


2/25/18 00:30 98.8 85 19 98/59 (72) 96   


 


2/24/18 20:40 99.0 94 17 103/60 (74) 97   


 


2/24/18 16:00 99.2 98 18 106/67 (80) 100   











Laboratory Tests








Test


  2/24/18


09:00 2/25/18


07:19


 


White Blood Count 4.7 TH/MM3  4.3 TH/MM3 


 


Red Blood Count 2.61 MIL/MM3  2.64 MIL/MM3 


 


Hemoglobin 7.5 GM/DL  7.5 GM/DL 


 


Hematocrit 21.6 %  21.9 % 


 


Mean Corpuscular Volume 82.8 FL  83.0 FL 


 


Mean Corpuscular Hemoglobin 28.6 PG  28.2 PG 


 


Mean Corpuscular Hemoglobin


Concent 34.6 % 


  34.0 % 


 


 


Red Cell Distribution Width 14.7 %  14.7 % 


 


Platelet Count 81 TH/MM3  98 TH/MM3 


 


Mean Platelet Volume 8.9 FL  8.5 FL 


 


CBC Comment AUTO DIFF  AUTO DIFF 


 


Differential Total Cells


Counted 100 


  


 


 


Neutrophils % (Manual) 69 %  


 


Band Neutrophils % 8 %  


 


Lymphocytes % 14 %  


 


Monocytes % 9 %  


 


Neutrophils # (Manual) 3.6 TH/MM3  


 


Differential Comment


  FINAL DIFF


MANUAL AUTO DIFF


CONFIRMED


 


Toxic Granulation 1+  


 


Platelet Estimate LOW  LOW 


 


Platelet Morphology Comment NORMAL  NORMAL 


 


Neutrophils (%) (Auto)  67.5 % 


 


Lymphocytes (%) (Auto)  19.5 % 


 


Monocytes (%) (Auto)  10.7 % 


 


Eosinophils (%) (Auto)  1.9 % 


 


Basophils (%) (Auto)  0.4 % 


 


Neutrophils # (Auto)  2.9 TH/MM3 


 


Lymphocytes # (Auto)  0.8 TH/MM3 


 


Monocytes # (Auto)  0.5 TH/MM3 


 


Eosinophils # (Auto)  0.1 TH/MM3 


 


Basophils # (Auto)  0.0 TH/MM3 


 


Red Cell Morphology Comment  NORMAL 








Laboratory Tests








Test


  2/24/18


09:00 2/25/18


07:19


 


Blood Urea Nitrogen 11 MG/DL  11 MG/DL 


 


Creatinine 0.71 MG/DL  0.64 MG/DL 


 


Random Glucose 125 MG/DL  105 MG/DL 


 


Total Protein 5.9 GM/DL  


 


Calcium Level 7.4 MG/DL  7.6 MG/DL 


 


Sodium Level 134 MEQ/L  134 MEQ/L 


 


Potassium Level 3.5 MEQ/L  3.8 MEQ/L 


 


Chloride Level 103 MEQ/L  103 MEQ/L 


 


Carbon Dioxide Level 24.3 MEQ/L  25.6 MEQ/L 


 


Anion Gap 7 MEQ/L  5 MEQ/L 


 


Estimat Glomerular Filtration


Rate 126 ML/MIN 


  142 ML/MIN 


 


 


Protein Corrected Calcium 8.1 MG/DL  








Microbiology








 Date/Time


Source Procedure


Growth Status


 


 


 2/24/18 09:00


Blood Peripheral Aerobic Blood Culture - Preliminary


NO GROWTH IN 1 DAY Resulted


 


 2/24/18 09:00


Blood Peripheral Anaerobic Blood Culture - Preliminary


NO GROWTH IN 1 DAY Resulted





 2/23/18 16:19


Blood Peripheral Aerobic Blood Culture - Final


Viridans Streptococcus Grp Complete


 


 2/23/18 16:19 Anaerobic Blood Culture - Final


Viridans Streptococcus Grp Complete





 2/22/18 22:15


Blood Peripheral Aerobic Blood Culture - Final


Viridans Streptococcus Grp Complete


 


 2/22/18 22:15 Anaerobic Blood Culture - Final


Viridans Streptococcus Grp Complete





 2/22/18 22:10


Blood Peripheral Aerobic Blood Culture - Final


Viridans Streptococcus Grp Complete


 


 2/22/18 22:10 Anaerobic Blood Culture - Final


Viridans Streptococcus Grp Complete











Imaging














Chest X-Ray 2/22/18 0731 Signed





Impressions: 





 Service Date/Time:  Thursday, February 22, 2018 07:48 - CONCLUSION:  1. 

Minimal 





 basilar atelectasis on the left. The lungs are otherwise clear.     Jared Padron MD 


 


Tumor Localization 2/22/18 0000 Signed





Impressions: 





 Service Date/Time:  Thursday, February 22, 2018 16:25 - CONCLUSION: Normal 





 examination.       Sheldon Spears MD 


 


Lumbar Spine MRI 2/22/18 0000 Signed





Impressions: 





 Service Date/Time:  Thursday, February 22, 2018 09:36 - CONCLUSION:  1. 

Evidence 





 of interval development of a superior right parasagittal extrusion of the disc 





 at the L4-5 level with enhancement in the extruded fragment.  There is 

extension 





 into the neural foramen on right side with neural impingement.  There is no 





 significant deformity of the thecal sac and.. 2. Stable broad-based bulging of 





 the L5-S1 disc and impingement of neural foraminal side. 3. Mild enhancement 

in 





 the posterior soft tissues of the lower lumbar region without discrete abscess 





 formation.     Ken Sykes MD 








Physical Exam


GENERAL:   Thin built, awake and alert, NAD


SKIN:  No rash 


HEENT: Head atraumatic. Normocephalic. No temporal or scalp tenderness.


Pupils equal round and reactive. Extraocular motions intact. No scleral 

icterus. 


No injection or drainage. Moist oropharynx mucosa Edentulous. 


NECK: Trachea midline. Supple, nontender, no meningeal signs.


CARDIOVASCULAR: Nl S1S2. (+) Murmur. 3/6 WICHO LSB. 


RESPIRATORY: Clear to auscultation. Breath sounds equal bilaterally. No wheezes

, rales, or rhonchi.  


GASTROINTESTINAL: Abdomen soft, non-tender, nondistended. 


MUSCULOSKELETAL: Extremities without clubbing, cyanosis. 2+ pitting edema at LE'

s


Back: small area of swelling in the soft tissue in the lumbar region with 

tenderness but no erythema.


NEUROLOGICAL: Awake and alert. Grossly non focal. 


PSYCH:  Cooperative


IV line sites with no e.o infection.








Assessment and Plan


Sepsis/Endocarditis  Strep viridans. 


Possible early discitis


Soft tissue swelling in the lumbar region possible early infective myositis


Previous history of endocarditis. Has Mobile density on the tricuspid valve. 


   -  concern with new IE


Previous history of discitis and epidural abscess


Fever -  temp lower. 


Thrombocytopenia.





Recommendations:


Follow repeat BC


Stop Vancomycin while awaiting blood cultures. 


Change Ampicillin to Penicillin IV.


Continue Gentamycin. 


Follow temps


Monitor progress


Consider SCOTTY. 


Monitor the platelet count.











Truman Abreu MD Feb 25, 2018 15:19

## 2018-02-25 NOTE — HHI.FPPN
Subjective


Remarks


Discussed with Mr Curran that all his blood cultures are showing the same 

organism. He wants to be able to go home and go back to work, etc as soon as he 

can. I explained that first his blood cultures need to be clear before we can 

even consider any options. We also discussed that he has strep viridans. He has 

all his teeth pulled and denies any mouth problems at this time.


His main complaint is right calf pain and swelling. he has not been on 

anticoagulation because his platelets have been low though they are improved 

today to 98,000.





Objective


Vitals





Vital Signs








  Date Time  Temp Pulse Resp B/P (MAP) Pulse Ox O2 Delivery O2 Flow Rate FiO2


 


2/25/18 08:00 97.9 79 18 109/65 (80) 98   


 


2/25/18 05:30 98.6 88 18 105/60 (75) 96   


 


2/25/18 00:30 98.8 85 19 98/59 (72) 96   


 


2/24/18 20:40 99.0 94 17 103/60 (74) 97   


 


2/24/18 16:00 99.2 98 18 106/67 (80) 100   


 


2/24/18 12:00 98.9 92 18 107/62 (77) 97   














I/O      


 


 2/24/18 2/24/18 2/24/18 2/25/18 2/25/18 2/25/18





 07:00 15:00 23:00 07:00 15:00 23:00


 


Intake Total 250 ml  900 ml 1850 ml 350 ml 


 


Balance 250 ml  900 ml 1850 ml 350 ml 


 


      


 


Intake Oral   900 ml 1500 ml  


 


IV Total 250 ml   350 ml 350 ml 


 


# Voids 3  1 4  


 


# Bowel Movements   0 0  








Result Diagram:  


2/25/18 0719                                                                   

             2/25/18 0719





Objective Remarks


GENERAL: This is a thin though not cachectic male.  Lying  in bed.  No acute 

distress except discussing his right calf


SKIN: Cool and dry


HEAD: Atraumatic. Normocephalic. 


EYES: Pupils equal round and reactive. Extraocular motions intact. No scleral 

icterus. No injection or drainage. 


ENT: Mostly edentulous.  Nose without bleeding, purulent drainage or septal 

hematoma. Throat without erythema, tonsillar hypertrophy or exudate. Uvula 

midline. Airway patent


CARDIOVASCULAR: 3/6 soft systolic ejection murmur at the apex/left sternal 

border.  Regular rate and rhythm


RESPIRATORY: Clear to auscultation. Breath sounds equal bilaterally. No wheezes

, rales, or rhonchi.  


GASTROINTESTINAL: Abdomen soft, non-tender, nondistended. No hepato-splenomegaly

, or palpable masses. No guarding.


MUSCULOSKELETAL: Bilateral 1+ edema in the feet.  No joint tenderness.


Back: slightly swollen, though improved, 4x4 cm TTP soft tissue midline at L4/

L5. No erythema.  No paravertebral muscle tenderness.


NEUROLOGICAL: Awake and alert. Cranial nerves II through XII intact.  Motor and 

sensory grossly within normal limits. Five out of 5 muscle strength in all 

muscle groups.  Normal speech.


Urinary Catheter:  No


Vascular Central Line Catheter:  No





A/P


Assessment and Plan


37-year-old male with history of IV drug use, endocarditis, vertebral 

osteomyelitis; he presented with acute on chronic low back pain, subjective 

fevers.  Infectious disease consult.  Echo shows for endocarditis.  Positive 

blood cultures to date all strep viridans.  Treatment as below.


Discharge Planning


Unclear at this time


Problem List:  


(1) Endocarditis, suspected


ICD Codes:  Z03.89 - Encounter for observation for other suspected diseases and 

conditions ruled out


Status:  Acute


Plan:  Echo shows a moderate to large sized mobile echodensity (0.71.4 cm) 

located on the anterior mitral valve leaflet.


Infectious disease consulted.


Positive blood cultures, see below


Vancomycin started, added amp and gent as all his cultures are still positive





(2) Bacteremia


ICD Codes:  R78.81 - Bacteremia


Status:  Acute


Plan:  Infectious disease consulted


Vancomycin started 2/23


Endocarditis


Repeat blood cultures pending


he needs to have clear blood cultures prior to any consideration of a line, etc





(3) Right calf pain


ICD Codes:  M79.661 - Pain in right lower leg


Status:  Acute


Plan:  check ultrasound. will need full anticoagulation if her has a DVT.


consider Heme consult is any questions or doubts with his platelets





(4) Methadone maintenance therapy patient


ICD Codes:  F11.20 - Opioid dependence, uncomplicated


Status:  Chronic


Plan:  Continue methadone 30 mg daily.





(5) History of drug abuse


ICD Codes:  Z87.898 - Personal history of other specified conditions


Status:  Acute


Plan:  No IV drug use for the last 1 year per the patient is what he said 

initially but then he admitted one time using iv 2 months ago. so many abusers 

are ashamed so it is difficult to be sure of their usage patterns. he is 

already asking about a 


PICC line


He has been doing methamphetamines, snorting


urine drug screen ordered and in the EMR


Counseled cessation





(6) Anemia


ICD Codes:  D64.9 - Anemia, unspecified


Status:  Chronic


Plan:  Hemoccult stool ordered.


Transfuse for hemoglobin less than 7.


On his last admission he was pancytopenic, likely from infection versus 

hepatitis versus other


This is likely from endocarditis plus his Hepatitis





(7) Thrombocytopenia


ICD Codes:  D69.6 - Thrombocytopenia, unspecified


Status:  Chronic


Plan:  Appears to be chronic


He was pancytopenic at his last admission.


Likely from endocarditis plus Hep C


Holding chemical prophylaxis initially but as his platelets are increased today

, will start heparin


Consider hematology consult





(8) FEN/DVT PPX/GI PPX/Nursing Orders 


Status:  Acute


Plan:  Fluids: Tolerating by mouth


Electrolytes: Monitor and replace as needed


Nutrition: Regular diet


Prophylaxis: SCDs, holding chemical prophylaxis because of low hemoglobin, 

thrombocytopenia initially but will start today at low level





(9) Back pain


ICD Codes:  M54.9 - Dorsalgia, unspecified


Status:  Acute


Plan:  Concern is for recurrent vertebral osteomyelitis.


Consulted infectious disease


Consulted neurosurgery Dr. Nichols (known to patient from 6/2016)


Case discussed with infectious disease and neurosurgery.


antibiotics were not started initially. however, 4/4 blood cultures are 

positive for gram positive cocci


Possible biopsy however any back infection would likely be septic from the 

heart or have led to the endocarditis and may very well be the same organism.  


As patient is bacteremic, antibiotics per ID. starting with vancomycin


Blood cultures pending for final results and 2nd day cultures pending


Continue methadone as below for pain.  Morphine 2 mg IV every 3 hours when 

necessary pain 6-10.





(10) Hepatitis B infection


ICD Codes:  B19.10 - Unspecified viral hepatitis B without hepatic coma


Status:  Chronic


Plan:  not new infection. can investigate his status








Problem Qualifiers





(1) Anemia:  


Qualified Codes:  D64.9 - Anemia, unspecified


(2) Back pain:  


Qualified Codes:  M54.5 - Low back pain


(3) Hepatitis B infection:  








Sonia Turcios MD Feb 25, 2018 11:09

## 2018-02-26 VITALS
OXYGEN SATURATION: 96 % | SYSTOLIC BLOOD PRESSURE: 104 MMHG | DIASTOLIC BLOOD PRESSURE: 64 MMHG | TEMPERATURE: 98.6 F | RESPIRATION RATE: 18 BRPM | HEART RATE: 88 BPM

## 2018-02-26 VITALS
TEMPERATURE: 98 F | RESPIRATION RATE: 20 BRPM | HEART RATE: 79 BPM | OXYGEN SATURATION: 100 % | SYSTOLIC BLOOD PRESSURE: 114 MMHG | DIASTOLIC BLOOD PRESSURE: 72 MMHG

## 2018-02-26 VITALS
HEART RATE: 89 BPM | OXYGEN SATURATION: 96 % | RESPIRATION RATE: 19 BRPM | TEMPERATURE: 97.8 F | SYSTOLIC BLOOD PRESSURE: 114 MMHG | DIASTOLIC BLOOD PRESSURE: 64 MMHG

## 2018-02-26 VITALS
RESPIRATION RATE: 19 BRPM | SYSTOLIC BLOOD PRESSURE: 106 MMHG | DIASTOLIC BLOOD PRESSURE: 58 MMHG | HEART RATE: 86 BPM | TEMPERATURE: 98.7 F | OXYGEN SATURATION: 97 %

## 2018-02-26 VITALS
TEMPERATURE: 97.1 F | SYSTOLIC BLOOD PRESSURE: 103 MMHG | RESPIRATION RATE: 18 BRPM | DIASTOLIC BLOOD PRESSURE: 64 MMHG | OXYGEN SATURATION: 98 % | HEART RATE: 84 BPM

## 2018-02-26 VITALS
SYSTOLIC BLOOD PRESSURE: 104 MMHG | RESPIRATION RATE: 18 BRPM | HEART RATE: 77 BPM | OXYGEN SATURATION: 99 % | TEMPERATURE: 98.1 F | DIASTOLIC BLOOD PRESSURE: 65 MMHG

## 2018-02-26 LAB
ALBUMIN SERPL-MCNC: 1.7 GM/DL (ref 3.4–5)
ALP SERPL-CCNC: 193 U/L (ref 45–117)
ALT SERPL-CCNC: 68 U/L (ref 12–78)
AST SERPL-CCNC: 91 U/L (ref 15–37)
BILIRUB SERPL-MCNC: 1.2 MG/DL (ref 0.2–1)
BUN SERPL-MCNC: 11 MG/DL (ref 7–18)
CALCIUM SERPL-MCNC: 7.8 MG/DL (ref 8.5–10.1)
CHLORIDE SERPL-SCNC: 101 MEQ/L (ref 98–107)
CREAT SERPL-MCNC: 0.6 MG/DL (ref 0.6–1.3)
GFR SERPLBLD BASED ON 1.73 SQ M-ARVRAT: 152 ML/MIN (ref 89–?)
GLUCOSE SERPL-MCNC: 78 MG/DL (ref 74–106)
HCO3 BLD-SCNC: 29.1 MEQ/L (ref 21–32)
PROT SERPL-MCNC: 6 GM/DL (ref 6.4–8.2)
SODIUM SERPL-SCNC: 133 MEQ/L (ref 136–145)

## 2018-02-26 RX ADMIN — HEPARIN SODIUM SCH UNITS: 10000 INJECTION, SOLUTION INTRAVENOUS; SUBCUTANEOUS at 08:40

## 2018-02-26 RX ADMIN — PENICILLIN G SODIUM SCH MLS/HR: 5000000 INJECTION, POWDER, FOR SOLUTION INTRAMUSCULAR; INTRAVENOUS at 09:35

## 2018-02-26 RX ADMIN — Medication SCH ML: at 21:13

## 2018-02-26 RX ADMIN — PANTOPRAZOLE SCH MG: 40 TABLET, DELAYED RELEASE ORAL at 08:39

## 2018-02-26 RX ADMIN — PENICILLIN G SODIUM SCH MLS/HR: 5000000 INJECTION, POWDER, FOR SOLUTION INTRAMUSCULAR; INTRAVENOUS at 16:49

## 2018-02-26 RX ADMIN — PENICILLIN G SODIUM SCH MLS/HR: 5000000 INJECTION, POWDER, FOR SOLUTION INTRAMUSCULAR; INTRAVENOUS at 02:16

## 2018-02-26 RX ADMIN — METHADONE HYDROCHLORIDE SCH MG: 10 TABLET ORAL at 08:39

## 2018-02-26 RX ADMIN — CYCLOBENZAPRINE HYDROCHLORIDE PRN MG: 10 TABLET, FILM COATED ORAL at 23:06

## 2018-02-26 RX ADMIN — HEPARIN SODIUM SCH UNITS: 10000 INJECTION, SOLUTION INTRAVENOUS; SUBCUTANEOUS at 21:13

## 2018-02-26 RX ADMIN — CLINDAMYCIN PHOSPHATE SCH MLS/HR: 150 INJECTION, SOLUTION INTRAMUSCULAR; INTRAVENOUS at 13:41

## 2018-02-26 RX ADMIN — PENICILLIN G SODIUM SCH MLS/HR: 5000000 INJECTION, POWDER, FOR SOLUTION INTRAMUSCULAR; INTRAVENOUS at 21:13

## 2018-02-26 RX ADMIN — PENICILLIN G SODIUM SCH MLS/HR: 5000000 INJECTION, POWDER, FOR SOLUTION INTRAMUSCULAR; INTRAVENOUS at 12:13

## 2018-02-26 RX ADMIN — ACETAMINOPHEN PRN MG: 500 TABLET ORAL at 14:15

## 2018-02-26 RX ADMIN — STANDARDIZED SENNA CONCENTRATE AND DOCUSATE SODIUM SCH TAB: 8.6; 5 TABLET, FILM COATED ORAL at 21:13

## 2018-02-26 RX ADMIN — STANDARDIZED SENNA CONCENTRATE AND DOCUSATE SODIUM SCH TAB: 8.6; 5 TABLET, FILM COATED ORAL at 08:39

## 2018-02-26 RX ADMIN — PENICILLIN G SODIUM SCH MLS/HR: 5000000 INJECTION, POWDER, FOR SOLUTION INTRAMUSCULAR; INTRAVENOUS at 04:51

## 2018-02-26 RX ADMIN — CLINDAMYCIN PHOSPHATE SCH MLS/HR: 150 INJECTION, SOLUTION INTRAMUSCULAR; INTRAVENOUS at 05:44

## 2018-02-26 RX ADMIN — Medication SCH ML: at 08:38

## 2018-02-26 RX ADMIN — CLINDAMYCIN PHOSPHATE SCH MLS/HR: 150 INJECTION, SOLUTION INTRAMUSCULAR; INTRAVENOUS at 23:57

## 2018-02-26 NOTE — HHI.FPPN
Subjective


Remarks


No change from yesterday.  No chest pain, shortness of breath.  His back pain 

is getting better day by day.  She continues to complain of right leg discomfort

, the same as yesterday.  He is ambulating around the room.  He denies fever, 

chills, nausea, vomiting.


 (Oniel Burleson MD, R3)





Objective


Vitals





Vital Signs








  Date Time  Temp Pulse Resp B/P (MAP) Pulse Ox O2 Delivery O2 Flow Rate FiO2


 


2/26/18 08:00 97.1 84 18 103/64 (77) 98   


 


2/26/18 04:00 98.0 79 20 114/72 (86) 100   


 


2/26/18 00:00 98.6 88 18 104/64 (77) 96   


 


2/26/18 00:00 98.6 88 18 104/64 (77) 96   


 


2/25/18 20:00 99.3 87 18 103/61 (75) 100   


 


2/25/18 12:00 98.0 88 18 108/71 (83) 98   














I/O      


 


 2/25/18 2/25/18 2/25/18 2/26/18 2/26/18 2/26/18





 07:00 15:00 23:00 07:00 15:00 23:00


 


Intake Total 1850 ml 550 ml 100 ml   


 


Balance 1850 ml 550 ml 100 ml   


 


      


 


Intake Oral 1500 ml     


 


IV Total 350 ml 550 ml 100 ml   


 


# Voids 4     


 


# Bowel Movements 0     








 (Oniel Burleson MD, R3)


Result Diagram:  


2/25/18 0719                                                                   

             2/25/18 0719





Imaging





Last 48 hours Impressions








Lower Extremity Ultrasound 2/25/18 0000 Signed





Impressions: 





 Service Date/Time:  Sunday, February 25, 2018 11:06 - CONCLUSION: No DVT.     





 Sheldon Zelaya MD 








Objective Remarks


GENERAL: This is a thin though not cachectic male.  Lying  in bed.  No acute 

distress 


SKIN: Cool and dry


HEAD: Atraumatic. Normocephalic. 


EYES: Pupils equal round and reactive. Extraocular motions intact. No scleral 

icterus. No injection or drainage. 


ENT: Mostly edentulous.  Nose without bleeding, purulent drainage or septal 

hematoma. Throat without erythema, tonsillar hypertrophy or exudate. Uvula 

midline. Airway patent


CARDIOVASCULAR: 3/6 soft systolic ejection murmur best heard at the apex/left 

sternal border.  Regular rate and rhythm


RESPIRATORY: Clear to auscultation. Breath sounds equal bilaterally. No wheezes

, rales, or rhonchi.  


GASTROINTESTINAL: Abdomen soft, non-tender, nondistended. No hepato-splenomegaly

, or palpable masses. No guarding.


MUSCULOSKELETAL: Bilateral 1+ edema in the feet.  No joint tenderness.  Right 

leg enlarged and swollen compared to left.  Right calf tenderness.


Back: slightly swollen, though improved, 4x4 cm TTP soft tissue midline at L4/

L5. No erythema.  No paravertebral muscle tenderness.


NEUROLOGICAL: Awake and alert. Cranial nerves II through XII intact.  Motor and 

sensory grossly within normal limits. Five out of 5 muscle strength in all 

muscle groups.  Normal speech.


 (Oniel Burleson MD, R3)





A/P


Assessment and Plan


37-year-old male with history of IV drug use, endocarditis, vertebral 

osteomyelitis; he presented with acute on chronic low back pain, subjective 

fevers.  Infectious disease consult.  Echo shows endocarditis.  Positive blood 

cultures to date all strep viridans.  Treatment as below.


Discharge Planning


Unclear at this time


 (Oniel Burleson MD, R3)


Attending Attestation


Patient seen and examined.  Case reviewed and discussed with the resident team.

  Agree with plan of care as discussed with me and documented in the resident 

note.


continued steady improvement


 (Sonia Turcios MD)


Problem List:  


(1) Endocarditis, suspected


ICD Codes:  Z03.89 - Encounter for observation for other suspected diseases and 

conditions ruled out


Status:  Acute


Plan:  Echo shows a moderate to large sized mobile echodensity (0.71.4 cm) 

located on the anterior mitral valve leaflet.


Infectious disease consulted.


Positive blood cultures, see below


Antibiotics per infectious disease: Penicillin and gentamicin





(2) Bacteremia


ICD Codes:  R78.81 - Bacteremia


Status:  Acute


Plan:  Infectious disease consulted


Sensitivities in the EMR


Endocarditis as above


Continue penicillin and gentamicin


he needs to have clear blood cultures prior to any consideration of a line, etc





Antibiotic history:


Vancomycin 2/23-2/24





(3) Right calf pain


ICD Codes:  M79.661 - Pain in right lower leg


Status:  Acute


Plan:  Ultrasound 2/25: No DVT


Continue heparin, follow platelets


consider Heme consult is any questions or doubts with his platelets





(4) Methadone maintenance therapy patient


ICD Codes:  F11.20 - Opioid dependence, uncomplicated


Status:  Chronic


Plan:  Continue methadone 30 mg daily.





(5) History of drug abuse


ICD Codes:  Z87.898 - Personal history of other specified conditions


Status:  Acute


Plan:  No IV drug use for the last 1 year per the patient is what he said 

initially but then he admitted one time using iv 2 months ago. so many abusers 

are ashamed so it is difficult to be sure of their usage patterns. he is 

already asking about a 


PICC line


He has been doing methamphetamines, snorting


urine drug screen ordered and in the EMR


Counseled cessation





(6) Anemia


ICD Codes:  D64.9 - Anemia, unspecified


Status:  Chronic


Plan:  Hemoccult stool ordered.


Transfuse for hemoglobin less than 7.


On his last admission he was pancytopenic


This is likely from endocarditis plus his Hepatitis





(7) Thrombocytopenia


ICD Codes:  D69.6 - Thrombocytopenia, unspecified


Status:  Chronic


Plan:  Appears to be chronic


He was pancytopenic at his last admission.


Likely from endocarditis plus Hep C


Holding chemical prophylaxis initially but as his platelets are increased today

, will start heparin


Consider hematology consult


Follow CBC





(8) Back pain


ICD Codes:  M54.9 - Dorsalgia, unspecified


Status:  Acute


Plan:  Initial Concern for recurrent vertebral osteomyelitis.


Consulted infectious disease


Consulted neurosurgery Dr. Nichols (known to patient from 6/2016)


Case discussed with infectious disease and neurosurgery.


antibiotics were not started initially. however, 4/4 blood cultures are 

positive for gram positive cocci (see above)


Possible biopsy however any back infection would likely be septic from the 

heart or have led to the endocarditis and may very well be the same organism.  


As patient is bacteremic, antibiotics per ID. starting with vancomycin


Continue methadone.  Morphine 2 mg IV every 3 hours when necessary pain 6-10.





(9) Hepatitis B infection


ICD Codes:  B19.10 - Unspecified viral hepatitis B without hepatic coma


Status:  Chronic


Plan:  not new infection. can investigate his status





(10) FEN/DVT PPX/GI PPX/Nursing Orders 


Status:  Acute


Plan:  Fluids: Tolerating by mouth


Electrolytes: Monitor and replace as needed


Nutrition: Regular diet


Prophylaxis: SCDs and heparin


 (Oniel Burleson MD, R3)





Problem Qualifiers





(1) Anemia:  


Qualified Codes:  D64.9 - Anemia, unspecified


(2) Back pain:  


Qualified Codes:  M54.5 - Low back pain


(3) Hepatitis B infection:  








Oniel Burleson MD, R3 Feb 26, 2018 09:05


Sonia Turcios MD Feb 27, 2018 13:08

## 2018-02-26 NOTE — HHI.IDPN
Subjective


Subjective


Remarks


Mr. 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 

presents with a three-week history of acute low back pain and subjective 

fevers. He does have chronic low back pain but reports an increase in the 

intensity of pain over last 3 weeks. This pain is worsened with movement and is 

relieved with ibuprofen.  Patient reports subjective fevers denies any chills 

is unsure about night sweats.  For work, he climbs trees and cuts limbs.  





Patient reports history of trauma with a tree limb that fell on him.


He reports nausea but no vomiting or diarrhea


Patient denies any bowel bladder incontinence.  He does report a change in his 

stream of urine for the last 3 weeks but denies any incontinence.


Patient denies any loss of sensation in the genitourinary area.


Patient denies any lower extremity or upper extremity weakness at the present 

time.


Patient denies any cardiorespiratory symptoms at the present time.


Patient reports that he has chronic numbness and tingling in his feet 

bilaterally but this is not new.


Patient denies any headache change in vision.


He denies any IV drug abuse but admits to doing oral or snorting medications.  

He reports taking methadone 30 mg per day.


He also reports snorting methamphetamine 2-3 times a week for the past 1-2 

months.





Due to his presenting symptoms and prior history and MRI of the spine was done 

which shows soft tissue density in the lumbar region but no discrete abscess.  

No obvious vertebral bone involvement or obvious fluid collection that can be 

drained at the present time.  Blood cultures have been drawn.  A 2-D echo is 

pending.  I discussed the case with Dr. Burleson and requested that no 

antibiotics be given to the patient unless the patient has positive cultures or 

obvious signs of sepsis.  I would like a WBC scan to be done to decide the 

further course of action.





Notes reviewed


Low grade temps


Back pain same


Constipated


Voiding ok


BC (+) GPC in pairs and chains


ESR 51


Antibiotics





Current Medications








 Medications


  (Trade)  Dose


 Ordered  Sig/Eduardo


 Route  Start Time


 Stop Time Status Last Admin


 


  (NS Flush)  2 ml  UNSCH  PRN


 IV FLUSH  2/22/18 14:00


     


 


 


  (NS Flush)  2 ml  BID


 IV FLUSH  2/22/18 21:00


    2/26/18 08:38


 


 


  (Zofran Inj)  4 mg  Q6H  PRN


 IVP  2/22/18 14:00


     


 


 


  (Ambien)  5 mg  HS  PRN


 PO  2/22/18 21:00


     


 


 


  (Narcan Inj)  0.4 mg  UNSCH  PRN


 IV PUSH  2/22/18 14:00


     


 


 


  (Nadya-Colace)  1 tab  BID


 PO  2/22/18 21:00


    2/26/18 08:39


 


 


  (Milk Of


 Magnesia Liq)  30 ml  Q12H  PRN


 PO  2/22/18 14:00


     


 


 


  (Senokot)  17.2 mg  Q12H  PRN


 PO  2/22/18 14:00


     


 


 


  (Dulcolax Supp)  10 mg  DAILY  PRN


 RECTAL  2/22/18 14:00


     


 


 


  (Lactulose Liq)  30 ml  DAILY  PRN


 PO  2/22/18 14:00


     


 


 


  (Dolophine)  30 mg  DAILY


 PO  2/23/18 09:00


    2/26/18 08:39


 


 


  (Morphine Inj)  2 mg  Q3H  PRN


 IV PUSH  2/22/18 15:00


     


 


 


  (Flexeril)  5 mg  Q8H  PRN


 PO  2/22/18 20:15


    2/23/18 23:37


 


 


  (Pill Splitter)  1 ea  UNSCH  PRN


 OTHER  2/22/18 20:30


     


 


 


  (Tylenol)  500 mg  Q6H  PRN


 PO  2/23/18 12:45


    2/26/18 14:15


 


 


  (Protonix)  40 mg  DAILY


 PO  2/23/18 14:30


    2/26/18 08:39


 


 


  (Heparin Inj)  5,000 units  Q12HR


 SQ  2/25/18 21:00


    2/26/18 08:40


 


 


 Penicillin G


 Sodium [id number]


 units/Sodium


 Chloride  100 ml @ 


 200 mls/hr  Q4H


 IV  2/25/18 18:00


    2/26/18 12:13


 


 


 Pharmacy Profile


 Note  0 ml @ 0


 mls/hr  UNSCH


 OTHER  2/25/18 15:15


     


 


 


 Gentamicin


 Sulfate 80 mg/


 Sodium Chloride  102 ml @ 


 102 mls/hr  Q8H


 IV  2/25/18 23:00


    2/26/18 13:41


 


 


 Miscellaneous


 Information  SPECIFIC


 LAB TO BE


 EDDIE...  ONCE  ONCE


 .XX  2/26/18 22:45


 2/26/18 22:46   


 








Lines


PIV


Past Medical History


Mitral valve endocarditis in September 2016


Vertebral osteomyelitis in July 2016


Chronic back pain


Polysubstance IV drug abuse


Tobacco abuse


Past Surgical History


Right tibia fracture repair with plates


Allergies:  


Coded Allergies:  


     *MDRO Multi-Drug Resistant Organism (Verified  Adverse Reaction, Unknown, 2 /5/18)


 MRSA (sputum & blood) - 6/25/13


 MRSA (wound) - 9/29/08


 **MRSA PCR Screen POSITIVE - 6/28/2016**





Objective


.





Vital Signs








  Date Time  Temp Pulse Resp B/P (MAP) Pulse Ox O2 Delivery O2 Flow Rate FiO2


 


2/26/18 14:49   16     


 


2/26/18 12:00 97.8 89 19 114/64 (81) 96   


 


2/26/18 09:40   16     


 


2/26/18 08:00 97.1 84 18 103/64 (77) 98   


 


2/26/18 04:00 98.0 79 20 114/72 (86) 100   


 


2/26/18 00:00 98.6 88 18 104/64 (77) 96   


 


2/26/18 00:00 98.6 88 18 104/64 (77) 96   


 


2/25/18 20:00 99.3 87 18 103/61 (75) 100   








.





Laboratory Tests








Test


  2/25/18


07:19


 


White Blood Count 4.3 TH/MM3 


 


Red Blood Count 2.64 MIL/MM3 


 


Hemoglobin 7.5 GM/DL 


 


Hematocrit 21.9 % 


 


Mean Corpuscular Volume 83.0 FL 


 


Mean Corpuscular Hemoglobin 28.2 PG 


 


Mean Corpuscular Hemoglobin


Concent 34.0 % 


 


 


Red Cell Distribution Width 14.7 % 


 


Platelet Count 98 TH/MM3 


 


Mean Platelet Volume 8.5 FL 


 


Neutrophils (%) (Auto) 67.5 % 


 


Lymphocytes (%) (Auto) 19.5 % 


 


Monocytes (%) (Auto) 10.7 % 


 


Eosinophils (%) (Auto) 1.9 % 


 


Basophils (%) (Auto) 0.4 % 


 


Neutrophils # (Auto) 2.9 TH/MM3 


 


Lymphocytes # (Auto) 0.8 TH/MM3 


 


Monocytes # (Auto) 0.5 TH/MM3 


 


Eosinophils # (Auto) 0.1 TH/MM3 


 


Basophils # (Auto) 0.0 TH/MM3 


 


CBC Comment AUTO DIFF 


 


Differential Comment


  AUTO DIFF


CONFIRMED


 


Platelet Estimate LOW 


 


Platelet Morphology Comment NORMAL 


 


Red Cell Morphology Comment NORMAL 








Laboratory Tests








Test


  2/25/18


07:19 2/26/18


10:21


 


Blood Urea Nitrogen 11 MG/DL  11 MG/DL 


 


Creatinine 0.64 MG/DL  0.60 MG/DL 


 


Random Glucose 105 MG/DL  78 MG/DL 


 


Calcium Level 7.6 MG/DL  7.8 MG/DL 


 


Sodium Level 134 MEQ/L  133 MEQ/L 


 


Potassium Level 3.8 MEQ/L  4.3 MEQ/L 


 


Chloride Level 103 MEQ/L  101 MEQ/L 


 


Carbon Dioxide Level 25.6 MEQ/L  29.1 MEQ/L 


 


Anion Gap 5 MEQ/L  3 MEQ/L 


 


Estimat Glomerular Filtration


Rate 142 ML/MIN 


  152 ML/MIN 


 


 


Total Protein  6.0 GM/DL 


 


Albumin  1.7 GM/DL 


 


Alkaline Phosphatase  193 U/L 


 


Aspartate Amino Transf


(AST/SGOT) 


  91 U/L 


 


 


Alanine Aminotransferase


(ALT/SGPT) 


  68 U/L 


 


 


Total Bilirubin  1.2 MG/DL 








Microbiology








 Date/Time


Source Procedure


Growth Status


 


 


 2/24/18 09:00


Blood Peripheral Aerobic Blood Culture - Final


Viridans Streptococcus Grp Resulted


 


 2/24/18 09:00


Blood Peripheral Anaerobic Blood Culture - Preliminary


NO GROWTH IN 2 DAYS Resulted





 2/23/18 16:19


Blood Peripheral Aerobic Blood Culture - Final


Viridans Streptococcus Grp Complete


 


 2/23/18 16:19 Anaerobic Blood Culture - Final


Viridans Streptococcus Grp Complete








Imaging














Chest X-Ray 2/22/18 0731 Signed





Impressions: 





 Service Date/Time:  Thursday, February 22, 2018 07:48 - CONCLUSION:  1. 

Minimal 





 basilar atelectasis on the left. The lungs are otherwise clear.     Jared Padron MD 


 


Tumor Localization 2/22/18 0000 Signed





Impressions: 





 Service Date/Time:  Thursday, February 22, 2018 16:25 - CONCLUSION: Normal 





 examination.       Sheldon Spears MD 


 


Lumbar Spine MRI 2/22/18 0000 Signed





Impressions: 





 Service Date/Time:  Thursday, February 22, 2018 09:36 - CONCLUSION:  1. 

Evidence 





 of interval development of a superior right parasagittal extrusion of the disc 





 at the L4-5 level with enhancement in the extruded fragment.  There is 

extension 





 into the neural foramen on right side with neural impingement.  There is no 





 significant deformity of the thecal sac and.. 2. Stable broad-based bulging of 





 the L5-S1 disc and impingement of neural foraminal side. 3. Mild enhancement 

in 





 the posterior soft tissues of the lower lumbar region without discrete abscess 





 formation.     Ken Sykes MD 








Physical Exam


GENERAL:   Thin built, awake and alert, NAD


SKIN: Dirt and mud stuck on his palms, fingers.  No rash 


HEAD: Atraumatic. Normocephalic. No temporal or scalp tenderness.


EYES: Pupils equal round and reactive. Extraocular motions intact. No scleral 

icterus. No injection or drainage. 


ENT: Nose without bleeding, purulent drainage or septal hematoma. Moist mucosa


NECK: Trachea midline. Supple, nontender, no meningeal signs.


CARDIOVASCULAR: HS audible.


RESPIRATORY: Clear to auscultation. Breath sounds equal bilaterally. No wheezes

, rales, or rhonchi.  


GASTROINTESTINAL: Abdomen soft, non-tender, nondistended. 


MUSCULOSKELETAL: Extremities without clubbing, cyanosis, or edema. 


Back: small area of swelling in the soft tissue in the lumbar region with 

tenderness but no erythema.


NEUROLOGICAL: Awake and alert. Non focal exam.


Psych cooperative


IV line sites with no e.o infection.





Assessment & Plan


Remarks


Assessment and Plan


Sepsis


Strep viridans bacteremia high grade


Aortic valve endocarditis.


Possible early discitis


Soft tissue swelling in the lumbar region possible early infective myositis


Previous history of endocarditis 


   -  concern with new IE


Previous history of discitis and epidural abscess


Fever





Recommendations:


Repeat BC


Continue Pen G 


Continue Genta IV synergy dosing.


Follow Cx


Follow clinically.











Kiera Lucas MD Feb 26, 2018 15:08

## 2018-02-27 VITALS
DIASTOLIC BLOOD PRESSURE: 70 MMHG | OXYGEN SATURATION: 99 % | RESPIRATION RATE: 18 BRPM | TEMPERATURE: 98.8 F | SYSTOLIC BLOOD PRESSURE: 112 MMHG | HEART RATE: 89 BPM

## 2018-02-27 VITALS
HEART RATE: 86 BPM | RESPIRATION RATE: 16 BRPM | OXYGEN SATURATION: 95 % | SYSTOLIC BLOOD PRESSURE: 99 MMHG | TEMPERATURE: 98.4 F | DIASTOLIC BLOOD PRESSURE: 65 MMHG

## 2018-02-27 VITALS
OXYGEN SATURATION: 100 % | DIASTOLIC BLOOD PRESSURE: 78 MMHG | SYSTOLIC BLOOD PRESSURE: 126 MMHG | HEART RATE: 60 BPM | TEMPERATURE: 98 F | RESPIRATION RATE: 18 BRPM

## 2018-02-27 VITALS
DIASTOLIC BLOOD PRESSURE: 61 MMHG | OXYGEN SATURATION: 96 % | TEMPERATURE: 97.8 F | SYSTOLIC BLOOD PRESSURE: 102 MMHG | RESPIRATION RATE: 17 BRPM | HEART RATE: 80 BPM

## 2018-02-27 VITALS
HEART RATE: 85 BPM | TEMPERATURE: 98.2 F | SYSTOLIC BLOOD PRESSURE: 111 MMHG | DIASTOLIC BLOOD PRESSURE: 74 MMHG | OXYGEN SATURATION: 97 % | RESPIRATION RATE: 18 BRPM

## 2018-02-27 VITALS
TEMPERATURE: 98 F | RESPIRATION RATE: 18 BRPM | DIASTOLIC BLOOD PRESSURE: 75 MMHG | HEART RATE: 80 BPM | OXYGEN SATURATION: 98 % | SYSTOLIC BLOOD PRESSURE: 109 MMHG

## 2018-02-27 LAB
BUN SERPL-MCNC: 11 MG/DL (ref 7–18)
CALCIUM SERPL-MCNC: 8.3 MG/DL (ref 8.5–10.1)
CHLORIDE SERPL-SCNC: 99 MEQ/L (ref 98–107)
CREAT SERPL-MCNC: 0.62 MG/DL (ref 0.6–1.3)
ERYTHROCYTE [DISTWIDTH] IN BLOOD BY AUTOMATED COUNT: 15.2 % (ref 11.6–17.2)
GFR SERPLBLD BASED ON 1.73 SQ M-ARVRAT: 147 ML/MIN (ref 89–?)
GLUCOSE SERPL-MCNC: 88 MG/DL (ref 74–106)
HCO3 BLD-SCNC: 28.2 MEQ/L (ref 21–32)
HCT VFR BLD CALC: 22.7 % (ref 39–51)
HGB BLD-MCNC: 7.8 GM/DL (ref 13–17)
MCH RBC QN AUTO: 28.3 PG (ref 27–34)
MCHC RBC AUTO-ENTMCNC: 34.3 % (ref 32–36)
MCV RBC AUTO: 82.4 FL (ref 80–100)
PLATELET # BLD: 119 TH/MM3 (ref 150–450)
PMV BLD AUTO: 8.1 FL (ref 7–11)
RBC # BLD AUTO: 2.76 MIL/MM3 (ref 4.5–5.9)
SODIUM SERPL-SCNC: 132 MEQ/L (ref 136–145)
WBC # BLD AUTO: 5 TH/MM3 (ref 4–11)

## 2018-02-27 RX ADMIN — METHADONE HYDROCHLORIDE SCH MG: 10 TABLET ORAL at 09:26

## 2018-02-27 RX ADMIN — PENICILLIN G SODIUM SCH MLS/HR: 5000000 INJECTION, POWDER, FOR SOLUTION INTRAMUSCULAR; INTRAVENOUS at 21:42

## 2018-02-27 RX ADMIN — PENICILLIN G SODIUM SCH MLS/HR: 5000000 INJECTION, POWDER, FOR SOLUTION INTRAMUSCULAR; INTRAVENOUS at 09:26

## 2018-02-27 RX ADMIN — PENICILLIN G SODIUM SCH MLS/HR: 5000000 INJECTION, POWDER, FOR SOLUTION INTRAMUSCULAR; INTRAVENOUS at 18:43

## 2018-02-27 RX ADMIN — STANDARDIZED SENNA CONCENTRATE AND DOCUSATE SODIUM SCH TAB: 8.6; 5 TABLET, FILM COATED ORAL at 21:42

## 2018-02-27 RX ADMIN — PENICILLIN G SODIUM SCH MLS/HR: 5000000 INJECTION, POWDER, FOR SOLUTION INTRAMUSCULAR; INTRAVENOUS at 14:12

## 2018-02-27 RX ADMIN — PENICILLIN G SODIUM SCH MLS/HR: 5000000 INJECTION, POWDER, FOR SOLUTION INTRAMUSCULAR; INTRAVENOUS at 02:23

## 2018-02-27 RX ADMIN — CLINDAMYCIN PHOSPHATE SCH MLS/HR: 150 INJECTION, SOLUTION INTRAMUSCULAR; INTRAVENOUS at 16:16

## 2018-02-27 RX ADMIN — PANTOPRAZOLE SCH MG: 40 TABLET, DELAYED RELEASE ORAL at 09:26

## 2018-02-27 RX ADMIN — CLINDAMYCIN PHOSPHATE SCH MLS/HR: 150 INJECTION, SOLUTION INTRAMUSCULAR; INTRAVENOUS at 06:13

## 2018-02-27 RX ADMIN — Medication SCH ML: at 09:26

## 2018-02-27 RX ADMIN — ONDANSETRON PRN MG: 2 INJECTION, SOLUTION INTRAMUSCULAR; INTRAVENOUS at 00:46

## 2018-02-27 RX ADMIN — CYCLOBENZAPRINE HYDROCHLORIDE PRN MG: 10 TABLET, FILM COATED ORAL at 21:43

## 2018-02-27 RX ADMIN — STANDARDIZED SENNA CONCENTRATE AND DOCUSATE SODIUM SCH TAB: 8.6; 5 TABLET, FILM COATED ORAL at 09:26

## 2018-02-27 RX ADMIN — Medication SCH ML: at 21:42

## 2018-02-27 RX ADMIN — HEPARIN SODIUM SCH UNITS: 10000 INJECTION, SOLUTION INTRAVENOUS; SUBCUTANEOUS at 21:43

## 2018-02-27 RX ADMIN — PENICILLIN G SODIUM SCH MLS/HR: 5000000 INJECTION, POWDER, FOR SOLUTION INTRAMUSCULAR; INTRAVENOUS at 05:27

## 2018-02-27 RX ADMIN — CLINDAMYCIN PHOSPHATE SCH MLS/HR: 150 INJECTION, SOLUTION INTRAMUSCULAR; INTRAVENOUS at 22:58

## 2018-02-27 RX ADMIN — HEPARIN SODIUM SCH UNITS: 10000 INJECTION, SOLUTION INTRAVENOUS; SUBCUTANEOUS at 09:26

## 2018-02-27 RX ADMIN — ZOLPIDEM TARTRATE PRN MG: 5 TABLET, COATED ORAL at 00:46

## 2018-02-27 NOTE — HHI.FPPN
Subjective


Remarks


SARAHON. Nurse reports during last hospitalization pt was energetic and walking 

the halls; however, both pt and nursing report pt feels really fatigued and has 

been sleeping a lot over the past day or two. Taking PO, ambulating w/o 

dizziness, voiding, stooling, but has back pain which makes it challenging for 

him to get comfortable. Also has some RUQ pain not associated with eating but 

causing intermittent nausea, but no V/D, Denies CP, SOB and DVT pain.


 (Phill Christina MD R1)





Objective


Vitals





Vital Signs








  Date Time  Temp Pulse Resp B/P (MAP) Pulse Ox O2 Delivery O2 Flow Rate FiO2


 


2/27/18 12:00 98.0 80 18 109/75 (86) 98   


 


2/27/18 08:33 97.8 80 17 102/61 (75) 96   


 


2/27/18 05:31 98.0 60 18 126/78 (94) 100   


 


2/27/18 00:46 98.2 85 18 111/74 (86) 97   


 


2/26/18 21:21 98.1 77 18 104/65 (78) 99   


 


2/26/18 16:00 98.7 86 19 106/58 (74) 97   


 


2/26/18 14:49   16     














I/O      


 


 2/26/18 2/26/18 2/26/18 2/27/18 2/27/18 2/27/18





 07:00 15:00 23:00 07:00 15:00 23:00


 


Intake Total     100 ml 


 


Balance     100 ml 


 


      


 


IV Total     100 ml 


 


# Bowel Movements   1   








 (Phill Christina MD R1)


Result Diagram:  


2/27/18 0610                                                                   

             2/27/18 0610





Objective Remarks


GENERAL: This is a thin though not cachectic male.  Lying  in bed.  No acute 

distress. Looks tired.


SKIN: Cool and dry.


HEAD: Atraumatic. Normocephalic. 


EYES: Pupils equal round and reactive. Extraocular motions intact. No scleral 

icterus. No injection or drainage. 


ENT: Mostly edentulous.  Nose without bleeding, purulent drainage or septal 

hematoma. Throat without erythema, tonsillar hypertrophy or exudate. Uvula 

midline. Airway patent


CARDIOVASCULAR: 2/6 soft systolic ejection murmur best heard at the apex/left 

sternal border.  Regular rate and rhythm


RESPIRATORY: Clear to auscultation. Breath sounds equal bilaterally. No wheezes

, rales, or rhonchi.  


GASTROINTESTINAL: Abdomen soft, TTP in RUQ, nondistended. No hepato-splenomegaly

, or palpable masses. No guarding.


MUSCULOSKELETAL: Bilateral 1+ edema in the feet.  No joint tenderness.  Right 

leg enlarged and swollen compared to left.  Right calf tenderness.


Back: Swollen area on back almost resolved, but TTP soft tissue midline at L4/

L5. No erythema.  No paravertebral muscle tenderness.


NEUROLOGICAL: Awake and alert. Cranial nerves II through XII intact.  Motor and 

sensory grossly within normal limits. Five out of 5 muscle strength in all 

muscle groups.  Normal speech.


Medications and IVs





Current Medications








 Medications


  (Trade)  Dose


 Ordered  Sig/Eduardo


 Route  Start Time


 Stop Time Status Last Admin


 


  (NS Flush)  2 ml  UNSCH  PRN


 IV FLUSH  2/22/18 14:00


     


 


 


  (NS Flush)  2 ml  BID


 IV FLUSH  2/22/18 21:00


    2/27/18 09:26


 


 


  (Zofran Inj)  4 mg  Q6H  PRN


 IVP  2/22/18 14:00


    2/27/18 00:46


 


 


  (Ambien)  5 mg  HS  PRN


 PO  2/22/18 21:00


    2/27/18 00:46


 


 


  (Narcan Inj)  0.4 mg  UNSCH  PRN


 IV PUSH  2/22/18 14:00


     


 


 


  (Nadya-Colace)  1 tab  BID


 PO  2/22/18 21:00


    2/27/18 09:26


 


 


  (Milk Of


 Magnesia Liq)  30 ml  Q12H  PRN


 PO  2/22/18 14:00


     


 


 


  (Senokot)  17.2 mg  Q12H  PRN


 PO  2/22/18 14:00


     


 


 


  (Dulcolax Supp)  10 mg  DAILY  PRN


 RECTAL  2/22/18 14:00


     


 


 


  (Lactulose Liq)  30 ml  DAILY  PRN


 PO  2/22/18 14:00


     


 


 


  (Dolophine)  30 mg  DAILY


 PO  2/23/18 09:00


    2/27/18 09:26


 


 


  (Morphine Inj)  2 mg  Q3H  PRN


 IV PUSH  2/22/18 15:00


     


 


 


  (Flexeril)  5 mg  Q8H  PRN


 PO  2/22/18 20:15


    2/26/18 23:06


 


 


  (Pill Splitter)  1 ea  UNSCH  PRN


 OTHER  2/22/18 20:30


     


 


 


  (Tylenol)  500 mg  Q6H  PRN


 PO  2/23/18 12:45


    2/26/18 14:15


 


 


  (Protonix)  40 mg  DAILY


 PO  2/23/18 14:30


    2/27/18 09:26


 


 


  (Heparin Inj)  5,000 units  Q12HR


 SQ  2/25/18 21:00


    2/27/18 09:26


 


 


 Penicillin G


 Sodium [id number]


 units/Sodium


 Chloride  100 ml @ 


 200 mls/hr  Q4H


 IV  2/25/18 18:00


    2/27/18 14:12


 


 


 Pharmacy Profile


 Note  0 ml @ 0


 mls/hr  UNSCH


 OTHER  2/25/18 15:15


     


 


 


 Gentamicin


 Sulfate 80 mg/


 Sodium Chloride  102 ml @ 


 102 mls/hr  Q8H


 IV  2/25/18 23:00


    2/27/18 06:13


 








 (Phill Christina MD R1)


Urinary Catheter:  No


 (Phill Christina MD R1)


Vascular Central Line Catheter:  No


 (Phill Christina MD R1)





A/P


Assessment and Plan


37-year-old male with history of IV drug use, endocarditis, vertebral 

osteomyelitis; he presented with acute on chronic low back pain, subjective 

fevers.  Infectious disease consult.  Echo shows endocarditis.  Positive blood 

cultures to date all strep viridans.  Treatment as below.


Discharge Planning


Unclear at this time


 (Phill Christina MD R1)


Attending Attestation


Patient seen and examined.  Case reviewed and discussed with the resident team.

  Agree with plan of care as discussed with me and documented in the resident 

note.


sleeping when I went by his room. unfortunately, his blood cultures are still 

positive but some are pending from today


 (Sonia Turcios MD)


Problem List:  


(1) Endocarditis, suspected


ICD Codes:  Z03.89 - Encounter for observation for other suspected diseases and 

conditions ruled out


Status:  Acute


Plan:  Echo shows a moderate to large sized mobile echodensity (0.71.4 cm) 

located on the anterior mitral valve leaflet.


Infectious disease consulted.


Positive blood cultures, see below


Antibiotics per infectious disease: Penicillin and gentamicin





(2) Bacteremia


ICD Codes:  R78.81 - Bacteremia


Status:  Acute


Plan:  Infectious disease consulted


Sensitivities in the EMR


Endocarditis as above


Continue penicillin and gentamicin


he needs to have clear blood cultures prior to any consideration of a line, etc





Antibiotic history:


Vancomycin 2/23-2/24





(3) Right calf pain


ICD Codes:  M79.661 - Pain in right lower leg


Status:  Acute


Plan:  Ultrasound 2/25: No DVT


Continue heparin, follow platelets


consider Heme consult is any questions or doubts with his platelets





(4) Methadone maintenance therapy patient


ICD Codes:  F11.20 - Opioid dependence, uncomplicated


Status:  Chronic


Plan:  Continue methadone 30 mg daily.





(5) History of drug abuse


ICD Codes:  Z87.898 - Personal history of other specified conditions


Status:  Acute


Plan:  No IV drug use for the last 1 year per the patient is what he said 

initially but then he admitted one time using iv 2 months ago. so many abusers 

are ashamed so it is difficult to be sure of their usage patterns. he is 

already asking about a 


PICC line


He has been doing methamphetamines, snorting


urine drug screen ordered and in the EMR


Counseled cessation





(6) Anemia


ICD Codes:  D64.9 - Anemia, unspecified


Status:  Chronic


Plan:  Hemoccult stool ordered.


Transfuse for hemoglobin less than 7.


On his last admission he was pancytopenic


This is likely from endocarditis plus his Hepatitis





(7) Thrombocytopenia


ICD Codes:  D69.6 - Thrombocytopenia, unspecified


Status:  Chronic


Plan:  Appears to be chronic


He was pancytopenic at his last admission.


Likely from endocarditis plus Hep C


Holding chemical prophylaxis initially but as his platelets are increased today

, will start heparin


Consider hematology consult


Follow CBC





(8) Back pain


ICD Codes:  M54.9 - Dorsalgia, unspecified


Status:  Acute


Plan:  Initial Concern for recurrent vertebral osteomyelitis.


Consulted infectious disease


Consulted neurosurgery Dr. Nichols (known to patient from 6/2016)


Case discussed with infectious disease and neurosurgery.


antibiotics were not started initially. however, 4/4 blood cultures are 

positive for gram positive cocci (see above)


Possible biopsy however any back infection would likely be septic from the 

heart or have led to the endocarditis and may very well be the same organism.  


As patient is bacteremic, antibiotics per ID. starting with vancomycin


Continue methadone.  Morphine 2 mg IV every 3 hours when necessary pain 6-10.





(9) Hepatitis B infection


ICD Codes:  B19.10 - Unspecified viral hepatitis B without hepatic coma


Status:  Chronic


Plan:  not new infection. can investigate his status





(10) FEN/DVT PPX/GI PPX/Nursing Orders 


Status:  Acute


Plan:  Fluids: Tolerating by mouth


Electrolytes: Monitor and replace as needed


Nutrition: Regular diet


Prophylaxis: SCDs and heparin


 (Phill Christina MD R1)





Problem Qualifiers





(1) Anemia:  


Qualified Codes:  D64.9 - Anemia, unspecified


(2) Back pain:  


Qualified Codes:  M54.5 - Low back pain


(3) Hepatitis B infection:  








Phill Christina MD R1 Feb 27, 2018 14:51


Sonia Turcios MD Feb 27, 2018 15:56

## 2018-02-28 VITALS
OXYGEN SATURATION: 96 % | HEART RATE: 82 BPM | DIASTOLIC BLOOD PRESSURE: 67 MMHG | TEMPERATURE: 98.9 F | RESPIRATION RATE: 18 BRPM | SYSTOLIC BLOOD PRESSURE: 101 MMHG

## 2018-02-28 VITALS
RESPIRATION RATE: 18 BRPM | OXYGEN SATURATION: 97 % | SYSTOLIC BLOOD PRESSURE: 115 MMHG | TEMPERATURE: 99.6 F | DIASTOLIC BLOOD PRESSURE: 57 MMHG | HEART RATE: 86 BPM

## 2018-02-28 VITALS
SYSTOLIC BLOOD PRESSURE: 116 MMHG | RESPIRATION RATE: 18 BRPM | OXYGEN SATURATION: 98 % | DIASTOLIC BLOOD PRESSURE: 80 MMHG | HEART RATE: 70 BPM | TEMPERATURE: 98.3 F

## 2018-02-28 VITALS
OXYGEN SATURATION: 97 % | SYSTOLIC BLOOD PRESSURE: 102 MMHG | RESPIRATION RATE: 16 BRPM | HEART RATE: 75 BPM | DIASTOLIC BLOOD PRESSURE: 61 MMHG | TEMPERATURE: 98.5 F

## 2018-02-28 VITALS
RESPIRATION RATE: 17 BRPM | OXYGEN SATURATION: 94 % | SYSTOLIC BLOOD PRESSURE: 106 MMHG | TEMPERATURE: 98.2 F | DIASTOLIC BLOOD PRESSURE: 68 MMHG | HEART RATE: 71 BPM

## 2018-02-28 VITALS
HEART RATE: 85 BPM | TEMPERATURE: 98.2 F | DIASTOLIC BLOOD PRESSURE: 73 MMHG | RESPIRATION RATE: 18 BRPM | OXYGEN SATURATION: 97 % | SYSTOLIC BLOOD PRESSURE: 108 MMHG

## 2018-02-28 VITALS
RESPIRATION RATE: 16 BRPM | DIASTOLIC BLOOD PRESSURE: 68 MMHG | SYSTOLIC BLOOD PRESSURE: 109 MMHG | HEART RATE: 88 BPM | OXYGEN SATURATION: 96 % | TEMPERATURE: 98.9 F

## 2018-02-28 LAB
BASOPHILS # BLD AUTO: 0 TH/MM3 (ref 0–0.2)
BASOPHILS NFR BLD: 0.5 % (ref 0–2)
BUN SERPL-MCNC: 13 MG/DL (ref 7–18)
CALCIUM SERPL-MCNC: 8.3 MG/DL (ref 8.5–10.1)
CHLORIDE SERPL-SCNC: 97 MEQ/L (ref 98–107)
CREAT SERPL-MCNC: 0.75 MG/DL (ref 0.6–1.3)
EOSINOPHIL # BLD: 0.1 TH/MM3 (ref 0–0.4)
EOSINOPHIL NFR BLD: 1 % (ref 0–4)
ERYTHROCYTE [DISTWIDTH] IN BLOOD BY AUTOMATED COUNT: 15 % (ref 11.6–17.2)
GFR SERPLBLD BASED ON 1.73 SQ M-ARVRAT: 118 ML/MIN (ref 89–?)
GLUCOSE SERPL-MCNC: 74 MG/DL (ref 74–106)
HCO3 BLD-SCNC: 27.5 MEQ/L (ref 21–32)
HCT VFR BLD CALC: 24.1 % (ref 39–51)
HGB BLD-MCNC: 8.3 GM/DL (ref 13–17)
LYMPHOCYTES # BLD AUTO: 1 TH/MM3 (ref 1–4.8)
LYMPHOCYTES NFR BLD AUTO: 12.8 % (ref 9–44)
MCH RBC QN AUTO: 28.7 PG (ref 27–34)
MCHC RBC AUTO-ENTMCNC: 34.4 % (ref 32–36)
MCV RBC AUTO: 83.3 FL (ref 80–100)
MONOCYTE #: 0.6 TH/MM3 (ref 0–0.9)
MONOCYTES NFR BLD: 7.6 % (ref 0–8)
NEUTROPHILS # BLD AUTO: 6.2 TH/MM3 (ref 1.8–7.7)
NEUTROPHILS NFR BLD AUTO: 78.1 % (ref 16–70)
PLATELET # BLD: 150 TH/MM3 (ref 150–450)
PMV BLD AUTO: 8.2 FL (ref 7–11)
RBC # BLD AUTO: 2.89 MIL/MM3 (ref 4.5–5.9)
SODIUM SERPL-SCNC: 131 MEQ/L (ref 136–145)
WBC # BLD AUTO: 7.9 TH/MM3 (ref 4–11)

## 2018-02-28 RX ADMIN — PENICILLIN G SODIUM SCH MLS/HR: 5000000 INJECTION, POWDER, FOR SOLUTION INTRAMUSCULAR; INTRAVENOUS at 02:57

## 2018-02-28 RX ADMIN — STANDARDIZED SENNA CONCENTRATE AND DOCUSATE SODIUM SCH TAB: 8.6; 5 TABLET, FILM COATED ORAL at 21:20

## 2018-02-28 RX ADMIN — PENICILLIN G SODIUM SCH MLS/HR: 5000000 INJECTION, POWDER, FOR SOLUTION INTRAMUSCULAR; INTRAVENOUS at 09:22

## 2018-02-28 RX ADMIN — HEPARIN SODIUM SCH UNITS: 10000 INJECTION, SOLUTION INTRAVENOUS; SUBCUTANEOUS at 07:34

## 2018-02-28 RX ADMIN — Medication SCH ML: at 07:35

## 2018-02-28 RX ADMIN — METHADONE HYDROCHLORIDE SCH MG: 10 TABLET ORAL at 07:34

## 2018-02-28 RX ADMIN — Medication SCH ML: at 21:20

## 2018-02-28 RX ADMIN — CLINDAMYCIN PHOSPHATE SCH MLS/HR: 150 INJECTION, SOLUTION INTRAMUSCULAR; INTRAVENOUS at 06:20

## 2018-02-28 RX ADMIN — ZOLPIDEM TARTRATE PRN MG: 5 TABLET, COATED ORAL at 21:20

## 2018-02-28 RX ADMIN — ONDANSETRON PRN MG: 2 INJECTION, SOLUTION INTRAMUSCULAR; INTRAVENOUS at 21:21

## 2018-02-28 RX ADMIN — PENICILLIN G SODIUM SCH MLS/HR: 5000000 INJECTION, POWDER, FOR SOLUTION INTRAMUSCULAR; INTRAVENOUS at 14:11

## 2018-02-28 RX ADMIN — CLINDAMYCIN PHOSPHATE SCH MLS/HR: 150 INJECTION, SOLUTION INTRAMUSCULAR; INTRAVENOUS at 15:54

## 2018-02-28 RX ADMIN — CLINDAMYCIN PHOSPHATE SCH MLS/HR: 150 INJECTION, SOLUTION INTRAMUSCULAR; INTRAVENOUS at 22:45

## 2018-02-28 RX ADMIN — PENICILLIN G SODIUM SCH MLS/HR: 5000000 INJECTION, POWDER, FOR SOLUTION INTRAMUSCULAR; INTRAVENOUS at 21:20

## 2018-02-28 RX ADMIN — PENICILLIN G SODIUM SCH MLS/HR: 5000000 INJECTION, POWDER, FOR SOLUTION INTRAMUSCULAR; INTRAVENOUS at 17:17

## 2018-02-28 RX ADMIN — PANTOPRAZOLE SCH MG: 40 TABLET, DELAYED RELEASE ORAL at 07:34

## 2018-02-28 RX ADMIN — CYCLOBENZAPRINE HYDROCHLORIDE PRN MG: 10 TABLET, FILM COATED ORAL at 21:21

## 2018-02-28 RX ADMIN — PENICILLIN G SODIUM SCH MLS/HR: 5000000 INJECTION, POWDER, FOR SOLUTION INTRAMUSCULAR; INTRAVENOUS at 05:05

## 2018-02-28 RX ADMIN — HEPARIN SODIUM SCH UNITS: 10000 INJECTION, SOLUTION INTRAVENOUS; SUBCUTANEOUS at 21:20

## 2018-02-28 RX ADMIN — STANDARDIZED SENNA CONCENTRATE AND DOCUSATE SODIUM SCH TAB: 8.6; 5 TABLET, FILM COATED ORAL at 07:34

## 2018-02-28 NOTE — HHI.FPPN
Subjective


Remarks


Patient doing well this morning.  He is sitting upright in a chair.  He does 

feel restless lying in bed.  He denies chest pain, nausea, vomiting, shortness 

of breath.  He does have some abdominal discomfort which is positional in 

nature.  It is not related to food.  It is very mild per the patient.  Denies 

constipation or diarrhea.  Denies fever or chills.


 (Oniel Burleson MD, R3)





Objective


Vitals





Vital Signs








  Date Time  Temp Pulse Resp B/P (MAP) Pulse Ox O2 Delivery O2 Flow Rate FiO2


 


2/28/18 08:25 98.2 71 17 106/68 (81) 94   


 


2/28/18 05:14 98.9 82 18 101/67 (78) 96   


 


2/28/18 00:27 99.6 86 18 115/57 (76) 97   


 


2/27/18 21:02 98.8 89 18 112/70 (84) 99   


 


2/27/18 16:28 98.4 86 16 99/65 (76) 95   


 


2/27/18 12:00 98.0 80 18 109/75 (86) 98   














I/O      


 


 2/27/18 2/27/18 2/27/18 2/28/18 2/28/18 2/28/18





 07:00 15:00 23:00 07:00 15:00 23:00


 


Intake Total  100 ml 200 ml   


 


Output Total    600 ml  


 


Balance  100 ml 200 ml -600 ml  


 


      


 


IV Total  100 ml 200 ml   


 


Output Urine Total    600 ml  








 (Oniel Burleson MD, R3)


Result Diagram:  


2/27/18 0610                                                                   

             2/27/18 0610





Objective Remarks


GENERAL: This is a thin though not cachectic male.  Sitting upright in chair.  

No acute distress. 


SKIN: Cool and dry.


HEAD: Atraumatic. Normocephalic. 


EYES: Pupils equal round and reactive. Extraocular motions intact. No scleral 

icterus. No injection or drainage. 


ENT: Mostly edentulous.  Nose without bleeding, purulent drainage or septal 

hematoma. Throat without erythema, tonsillar hypertrophy or exudate. Uvula 

midline. Airway patent


CARDIOVASCULAR: 3/6 soft systolic ejection murmur best heard at the cardiac 

apex.  Regular rate and rhythm


RESPIRATORY: Clear to auscultation. Breath sounds equal bilaterally. No wheezes

, rales, or rhonchi.  


GASTROINTESTINAL: Abdomen soft, TTP in RUQ, nondistended. No hepato-splenomegaly

, or palpable masses. No guarding.


MUSCULOSKELETAL: Bilateral 1+ edema in the feet.  No joint tenderness.  Right 

leg enlarged and swollen compared to left.  Right calf tenderness.


Back: Swollen area on back almost resolved, but TTP soft tissue midline at L4/

L5. No erythema.  No paravertebral muscle tenderness.


NEUROLOGICAL: Awake and alert. Cranial nerves II through XII intact.  Motor and 

sensory grossly within normal limits. Five out of 5 muscle strength in all 

muscle groups.  Normal speech.


 (Oniel Burleson MD, R3)





A/P


Assessment and Plan


37-year-old male with history of IV drug use, endocarditis, vertebral 

osteomyelitis; he presented with acute on chronic low back pain, subjective 

fevers.  Infectious disease consult.  Echo shows endocarditis.  Positive blood 

cultures to date all strep viridans.  Treatment as below.


Discharge Planning


Unclear at this time


 (Oniel Burleson MD, R3)


Attending Attestation


Patient seen and examined.  Case reviewed and discussed with the resident team.

  Agree with plan of care as discussed with me and documented in the resident 

note.


he asks for a PICC line daily


 (Sonia Turcios MD)


Problem List:  


(1) Endocarditis, suspected


ICD Codes:  Z03.89 - Encounter for observation for other suspected diseases and 

conditions ruled out


Status:  Acute


Plan:  Echo shows a moderate to large sized mobile echodensity (0.71.4 cm) 

located on the anterior mitral valve leaflet.


Infectious disease consulted.


Positive blood cultures, see below


Antibiotics per infectious disease: Penicillin and gentamicin





(2) Bacteremia


ICD Codes:  R78.81 - Bacteremia


Status:  Acute


Plan:  Infectious disease consulted


Sensitivities in the EMR


Endocarditis as above


Repeat blood cultures 2/27 at 0600 pending


Continue penicillin and gentamicin


he needs to have clear blood cultures prior to any consideration of a line, etc





Antibiotic history:


Vancomycin 2/23-2/24





(3) Right calf pain


ICD Codes:  M79.661 - Pain in right lower leg


Status:  Resolved


Plan:  Ultrasound 2/25: No DVT


Continue heparin, follow platelets


consider Heme consult is any questions or doubts with his platelets





(4) Methadone maintenance therapy patient


ICD Codes:  F11.20 - Opioid dependence, uncomplicated


Status:  Chronic


Plan:  Continue methadone 30 mg daily.





(5) History of drug abuse


ICD Codes:  Z87.898 - Personal history of other specified conditions


Status:  Acute


Plan:  No IV drug use for the last 1 year per the patient is what he said 

initially but then he admitted one time using iv 2 months ago. so many abusers 

are ashamed so it is difficult to be sure of their usage patterns. he is 

already asking about a 


PICC line


He has been doing methamphetamines, snorting


urine drug screen ordered and in the EMR


Counseled cessation





(6) Anemia


ICD Codes:  D64.9 - Anemia, unspecified


Status:  Chronic


Plan:  Hemoccult stool negative


Transfuse for hemoglobin less than 7.


On his last admission he was pancytopenic


This is likely from endocarditis plus his Hepatitis





(7) Thrombocytopenia


ICD Codes:  D69.6 - Thrombocytopenia, unspecified


Status:  Chronic


Plan:  Appears to be chronic


He was pancytopenic at his last admission.


Likely from endocarditis plus Hep C


Careful with heparin


Consider hematology consult


Follow CBC





(8) Back pain


ICD Codes:  M54.9 - Dorsalgia, unspecified


Status:  Acute


Plan:  Initial Concern for recurrent vertebral osteomyelitis.


Consulted infectious disease


Consulted neurosurgery Dr. Nichols (known to patient from 6/2016)


Case discussed with infectious disease and neurosurgery.


antibiotics were not started initially. however, 4/4 blood cultures are 

positive for gram positive cocci (see above)


Possible biopsy however any back infection would likely be septic from the 

heart or have led to the endocarditis and may very well be the same organism.  


As patient is bacteremic, antibiotics per ID. starting with vancomycin


Continue methadone.  Morphine 2 mg IV every 3 hours when necessary pain 6-10.





(9) Hepatitis B infection


ICD Codes:  B19.10 - Unspecified viral hepatitis B without hepatic coma


Status:  Chronic


Plan:  not new infection. can investigate his status





(10) FEN/DVT PPX/GI PPX/Nursing Orders 


Status:  Acute


Plan:  Fluids: Tolerating by mouth


Electrolytes: Monitor and replace as needed


Nutrition: Regular diet


Prophylaxis: SCDs and heparin


 (Oniel Burleson MD, R3)





Problem Qualifiers





(1) Anemia:  


Qualified Codes:  D64.9 - Anemia, unspecified


(2) Back pain:  


Qualified Codes:  M54.5 - Low back pain


(3) Hepatitis B infection:  








Oniel Burleson MD, R3 Feb 28, 2018 09:21


Sonia Turcios MD Mar 3, 2018 12:20

## 2018-03-01 VITALS
TEMPERATURE: 98 F | RESPIRATION RATE: 20 BRPM | SYSTOLIC BLOOD PRESSURE: 102 MMHG | DIASTOLIC BLOOD PRESSURE: 66 MMHG | OXYGEN SATURATION: 98 % | HEART RATE: 86 BPM

## 2018-03-01 VITALS
RESPIRATION RATE: 18 BRPM | HEART RATE: 82 BPM | SYSTOLIC BLOOD PRESSURE: 106 MMHG | DIASTOLIC BLOOD PRESSURE: 72 MMHG | TEMPERATURE: 97.8 F | OXYGEN SATURATION: 98 %

## 2018-03-01 VITALS
SYSTOLIC BLOOD PRESSURE: 108 MMHG | OXYGEN SATURATION: 97 % | TEMPERATURE: 98.2 F | RESPIRATION RATE: 18 BRPM | DIASTOLIC BLOOD PRESSURE: 73 MMHG | HEART RATE: 85 BPM

## 2018-03-01 VITALS
DIASTOLIC BLOOD PRESSURE: 76 MMHG | TEMPERATURE: 99.2 F | HEART RATE: 91 BPM | OXYGEN SATURATION: 99 % | RESPIRATION RATE: 20 BRPM | SYSTOLIC BLOOD PRESSURE: 114 MMHG

## 2018-03-01 VITALS
DIASTOLIC BLOOD PRESSURE: 74 MMHG | HEART RATE: 92 BPM | SYSTOLIC BLOOD PRESSURE: 107 MMHG | OXYGEN SATURATION: 97 % | RESPIRATION RATE: 17 BRPM | TEMPERATURE: 98.7 F

## 2018-03-01 VITALS
TEMPERATURE: 97.9 F | SYSTOLIC BLOOD PRESSURE: 96 MMHG | RESPIRATION RATE: 20 BRPM | HEART RATE: 87 BPM | DIASTOLIC BLOOD PRESSURE: 68 MMHG | OXYGEN SATURATION: 97 %

## 2018-03-01 RX ADMIN — Medication SCH ML: at 20:51

## 2018-03-01 RX ADMIN — CLINDAMYCIN PHOSPHATE SCH MLS/HR: 150 INJECTION, SOLUTION INTRAMUSCULAR; INTRAVENOUS at 14:39

## 2018-03-01 RX ADMIN — HEPARIN SODIUM SCH UNITS: 10000 INJECTION, SOLUTION INTRAVENOUS; SUBCUTANEOUS at 08:32

## 2018-03-01 RX ADMIN — PENICILLIN G SODIUM SCH MLS/HR: 5000000 INJECTION, POWDER, FOR SOLUTION INTRAMUSCULAR; INTRAVENOUS at 13:26

## 2018-03-01 RX ADMIN — STANDARDIZED SENNA CONCENTRATE AND DOCUSATE SODIUM SCH TAB: 8.6; 5 TABLET, FILM COATED ORAL at 20:52

## 2018-03-01 RX ADMIN — CLINDAMYCIN PHOSPHATE SCH MLS/HR: 150 INJECTION, SOLUTION INTRAMUSCULAR; INTRAVENOUS at 06:12

## 2018-03-01 RX ADMIN — PENICILLIN G SODIUM SCH MLS/HR: 5000000 INJECTION, POWDER, FOR SOLUTION INTRAMUSCULAR; INTRAVENOUS at 17:23

## 2018-03-01 RX ADMIN — PANTOPRAZOLE SCH MG: 40 TABLET, DELAYED RELEASE ORAL at 08:31

## 2018-03-01 RX ADMIN — ZOLPIDEM TARTRATE PRN MG: 5 TABLET, COATED ORAL at 21:12

## 2018-03-01 RX ADMIN — Medication SCH ML: at 08:33

## 2018-03-01 RX ADMIN — STANDARDIZED SENNA CONCENTRATE AND DOCUSATE SODIUM SCH TAB: 8.6; 5 TABLET, FILM COATED ORAL at 08:31

## 2018-03-01 RX ADMIN — METHADONE HYDROCHLORIDE SCH MG: 10 TABLET ORAL at 08:32

## 2018-03-01 RX ADMIN — PENICILLIN G SODIUM SCH MLS/HR: 5000000 INJECTION, POWDER, FOR SOLUTION INTRAMUSCULAR; INTRAVENOUS at 01:03

## 2018-03-01 RX ADMIN — HEPARIN SODIUM SCH UNITS: 10000 INJECTION, SOLUTION INTRAVENOUS; SUBCUTANEOUS at 20:52

## 2018-03-01 RX ADMIN — CYCLOBENZAPRINE HYDROCHLORIDE PRN MG: 10 TABLET, FILM COATED ORAL at 20:52

## 2018-03-01 RX ADMIN — CLINDAMYCIN PHOSPHATE SCH MLS/HR: 150 INJECTION, SOLUTION INTRAMUSCULAR; INTRAVENOUS at 20:53

## 2018-03-01 RX ADMIN — PENICILLIN G SODIUM SCH MLS/HR: 5000000 INJECTION, POWDER, FOR SOLUTION INTRAMUSCULAR; INTRAVENOUS at 05:17

## 2018-03-01 RX ADMIN — PENICILLIN G SODIUM SCH MLS/HR: 5000000 INJECTION, POWDER, FOR SOLUTION INTRAMUSCULAR; INTRAVENOUS at 20:53

## 2018-03-01 RX ADMIN — PENICILLIN G SODIUM SCH MLS/HR: 5000000 INJECTION, POWDER, FOR SOLUTION INTRAMUSCULAR; INTRAVENOUS at 09:35

## 2018-03-01 NOTE — HHI.FPPN
Subjective


Remarks


Patient is doing well this morning.  He has no new complaints at this time.  He 

still feels fatigued.  He denies chest pain, nausea, vomiting area and his back 

pain is much improved.


Denies fever, chills, diarrhea, constipation.


 (Oniel Burleson MD, R3)





Objective


Vitals





Vital Signs








  Date Time  Temp Pulse Resp B/P (MAP) Pulse Ox O2 Delivery O2 Flow Rate FiO2


 


3/1/18 08:30 98.0 86 20 102/66 (78) 98   


 


3/1/18 04:00 97.8 82 18 106/72 (83) 98   


 


3/1/18 00:00 98.2 85 18 108/73 (85) 97   


 


2/28/18 20:00 98.2 85 18 108/73 (85) 97   


 


2/28/18 16:25 98.3 70 18 116/80 (92) 98   


 


2/28/18 16:22 98.9 88 16 109/68 (82) 96   


 


2/28/18 12:09 98.5 75 16 102/61 (75) 97   


 


2/28/18 09:22   12     














I/O      


 


 2/28/18 2/28/18 2/28/18 3/1/18 3/1/18 3/1/18





 07:00 15:00 23:00 07:00 15:00 23:00


 


Intake Total   1480 ml 402 ml  


 


Output Total 600 ml     


 


Balance -600 ml  1480 ml 402 ml  


 


      


 


Intake Oral   1180 ml   


 


IV Total   300 ml 402 ml  


 


Output Urine Total 600 ml     


 


# Voids   12   








 (Oniel Burleson MD, R3)


Result Diagram:  


2/28/18 0720                                                                   

             2/28/18 0720





Objective Remarks


GENERAL: This is a thin though not cachectic male.  Sitting upright in bed.  No 

acute distress. 


SKIN: Cool and dry.


HEAD: Atraumatic. Normocephalic. 


EYES: Pupils equal round and reactive. Extraocular motions intact. No scleral 

icterus. No injection or drainage. 


ENT: Mostly edentulous.  Nose without bleeding, purulent drainage or septal 

hematoma. Throat without erythema, tonsillar hypertrophy or exudate. Uvula 

midline. Airway patent


CARDIOVASCULAR: 3/6 soft systolic ejection murmur best heard at the cardiac 

apex.  Regular rate and rhythm


RESPIRATORY: Clear to auscultation. Breath sounds equal bilaterally. No wheezes

, rales, or rhonchi.  


GASTROINTESTINAL: Abdomen soft, TTP in RUQ, nondistended. No hepato-splenomegaly

, or palpable masses. No guarding.


MUSCULOSKELETAL: Bilateral 1+ edema in the feet.  No joint tenderness.    


Back: Swollen area on back almost resolved, but TTP soft tissue midline at L4/

L5. No erythema.  No paravertebral muscle tenderness.


NEUROLOGICAL: Awake and alert. Cranial nerves II through XII intact.  Motor and 

sensory grossly within normal limits. Five out of 5 muscle strength in all 

muscle groups.  Normal speech.


 (Oniel Burleson MD, R3)





A/P


Assessment and Plan


37-year-old male with history of IV drug use, endocarditis, vertebral 

osteomyelitis; he presented with acute on chronic low back pain, subjective 

fevers.  Infectious disease consult.  Echo shows endocarditis.  Positive blood 

cultures to date all strep viridans.  Treatment as below.


Discharge Planning


Unclear at this time


 (Oniel Burleson MD, R3)


Attending Attestation


Patient seen and examined.  Case reviewed and discussed with the resident team.

  Agree with plan of care as discussed with me and documented in the resident 

note.


he is high risk for misusing a line as he has used iv drugs plus his roommate 

uses crack 


 (Sonia Turcios MD)


Problem List:  


(1) Endocarditis


ICD Codes:  I38 - Endocarditis, valve unspecified


Plan:  Echo shows a moderate to large sized mobile echodensity (0.71.4 cm) 

located on the anterior mitral valve leaflet.


Infectious disease consulted.


Positive blood cultures, see below


Antibiotics per infectious disease: Penicillin and gentamicin





Patient understands with continued IV drug use and infection, he could have 

serious morbidity and even death





(2) Bacteremia


ICD Codes:  R78.81 - Bacteremia


Status:  Acute


Plan:  Infectious disease consulted


Sensitivities in the EMR


Endocarditis as above


Repeat blood cultures 2/27 at 0600 NGTD


Continue penicillin and gentamicin


he needs to have clear blood cultures prior to any consideration of a line, etc





Antibiotic history:


Vancomycin 2/23-2/24





(3) Right calf pain


ICD Codes:  M79.661 - Pain in right lower leg


Status:  Resolved


Plan:  Ultrasound 2/25: No DVT


Continue heparin, follow platelets


consider Heme consult is any questions or doubts with his platelets





(4) Methadone maintenance therapy patient


ICD Codes:  F11.20 - Opioid dependence, uncomplicated


Status:  Chronic


Plan:  Continue methadone 30 mg daily.





(5) History of drug abuse


ICD Codes:  Z87.898 - Personal history of other specified conditions


Status:  Acute


Plan:  No IV drug use for the last 1 year per the patient is what he said 

initially but then he admitted one time using iv 2 months ago. so many abusers 

are ashamed so it is difficult to be sure of their usage patterns. he is 

already asking about a 


PICC line


He has been doing methamphetamines, snorting


urine drug screen ordered and in the EMR


Counseled cessation





(6) Anemia


ICD Codes:  D64.9 - Anemia, unspecified


Status:  Chronic


Plan:  Hemoccult stool negative


Transfuse for hemoglobin less than 7.


On his last admission he was pancytopenic


This is likely from endocarditis plus his Hepatitis





(7) Thrombocytopenia


ICD Codes:  D69.6 - Thrombocytopenia, unspecified


Status:  Chronic


Plan:  Appears to be chronic


He was pancytopenic at his last admission.


Likely from endocarditis plus Hep C


Careful with heparin


Consider hematology consult


Follow CBC





(8) Back pain


ICD Codes:  M54.9 - Dorsalgia, unspecified


Status:  Acute


Plan:  Initial Concern for recurrent vertebral osteomyelitis.


Consulted infectious disease


Consulted neurosurgery Dr. Nichols (known to patient from 6/2016)


Case discussed with infectious disease and neurosurgery.


antibiotics were not started initially. however, 4/4 blood cultures are 

positive for gram positive cocci (see above)


Possible biopsy however any back infection would likely be septic from the 

heart or have led to the endocarditis and may very well be the same organism.  


As patient is bacteremic, antibiotics per ID. starting with vancomycin


Continue methadone.  Morphine 2 mg IV every 3 hours when necessary pain 6-10.





(9) Hepatitis B infection


ICD Codes:  B19.10 - Unspecified viral hepatitis B without hepatic coma


Status:  Chronic


Plan:  not new infection. can investigate his status





(10) FEN/DVT PPX/GI PPX/Nursing Orders 


Status:  Acute


Plan:  Fluids: Tolerating by mouth


Electrolytes: Monitor and replace as needed


Nutrition: Regular diet


Prophylaxis: SCDs and heparin


 (Oniel Burleson MD, R3)





Problem Qualifiers





(1) Endocarditis:  


Qualified Codes:  I33.0 - Acute and subacute infective endocarditis


(2) Anemia:  


Qualified Codes:  D64.9 - Anemia, unspecified


(3) Back pain:  


Qualified Codes:  M54.5 - Low back pain


(4) Hepatitis B infection:  








Oniel Burleson MD, R3 Mar 1, 2018 08:39


Sonia Turcios MD Mar 3, 2018 12:21

## 2018-03-02 VITALS
TEMPERATURE: 98.1 F | OXYGEN SATURATION: 98 % | SYSTOLIC BLOOD PRESSURE: 102 MMHG | RESPIRATION RATE: 17 BRPM | DIASTOLIC BLOOD PRESSURE: 64 MMHG | HEART RATE: 90 BPM

## 2018-03-02 VITALS
SYSTOLIC BLOOD PRESSURE: 108 MMHG | DIASTOLIC BLOOD PRESSURE: 73 MMHG | RESPIRATION RATE: 16 BRPM | OXYGEN SATURATION: 98 % | HEART RATE: 84 BPM | TEMPERATURE: 98.6 F

## 2018-03-02 VITALS
RESPIRATION RATE: 18 BRPM | TEMPERATURE: 98.8 F | DIASTOLIC BLOOD PRESSURE: 75 MMHG | OXYGEN SATURATION: 98 % | SYSTOLIC BLOOD PRESSURE: 110 MMHG | HEART RATE: 88 BPM

## 2018-03-02 VITALS
HEART RATE: 86 BPM | OXYGEN SATURATION: 98 % | DIASTOLIC BLOOD PRESSURE: 66 MMHG | RESPIRATION RATE: 16 BRPM | SYSTOLIC BLOOD PRESSURE: 101 MMHG | TEMPERATURE: 97.9 F

## 2018-03-02 VITALS
SYSTOLIC BLOOD PRESSURE: 110 MMHG | OXYGEN SATURATION: 97 % | DIASTOLIC BLOOD PRESSURE: 66 MMHG | TEMPERATURE: 98.3 F | RESPIRATION RATE: 17 BRPM | HEART RATE: 80 BPM

## 2018-03-02 LAB
BUN SERPL-MCNC: 14 MG/DL (ref 7–18)
CALCIUM SERPL-MCNC: 8.1 MG/DL (ref 8.5–10.1)
CHLORIDE SERPL-SCNC: 96 MEQ/L (ref 98–107)
CREAT SERPL-MCNC: 0.72 MG/DL (ref 0.6–1.3)
ERYTHROCYTE [DISTWIDTH] IN BLOOD BY AUTOMATED COUNT: 15.4 % (ref 11.6–17.2)
GFR SERPLBLD BASED ON 1.73 SQ M-ARVRAT: 124 ML/MIN (ref 89–?)
GLUCOSE SERPL-MCNC: 115 MG/DL (ref 74–106)
HCO3 BLD-SCNC: 24 MEQ/L (ref 21–32)
HCT VFR BLD CALC: 21.7 % (ref 39–51)
HGB BLD-MCNC: 7.5 GM/DL (ref 13–17)
MCH RBC QN AUTO: 28.7 PG (ref 27–34)
MCHC RBC AUTO-ENTMCNC: 34.5 % (ref 32–36)
MCV RBC AUTO: 83 FL (ref 80–100)
PLATELET # BLD: 137 TH/MM3 (ref 150–450)
PMV BLD AUTO: 8 FL (ref 7–11)
RBC # BLD AUTO: 2.62 MIL/MM3 (ref 4.5–5.9)
SODIUM SERPL-SCNC: 131 MEQ/L (ref 136–145)
WBC # BLD AUTO: 5 TH/MM3 (ref 4–11)

## 2018-03-02 RX ADMIN — Medication SCH ML: at 07:53

## 2018-03-02 RX ADMIN — STANDARDIZED SENNA CONCENTRATE AND DOCUSATE SODIUM SCH TAB: 8.6; 5 TABLET, FILM COATED ORAL at 22:00

## 2018-03-02 RX ADMIN — HEPARIN SODIUM SCH UNITS: 10000 INJECTION, SOLUTION INTRAVENOUS; SUBCUTANEOUS at 22:01

## 2018-03-02 RX ADMIN — PENICILLIN G SODIUM SCH MLS/HR: 5000000 INJECTION, POWDER, FOR SOLUTION INTRAMUSCULAR; INTRAVENOUS at 08:57

## 2018-03-02 RX ADMIN — PENICILLIN G SODIUM SCH MLS/HR: 5000000 INJECTION, POWDER, FOR SOLUTION INTRAMUSCULAR; INTRAVENOUS at 13:42

## 2018-03-02 RX ADMIN — CLINDAMYCIN PHOSPHATE SCH MLS/HR: 150 INJECTION, SOLUTION INTRAMUSCULAR; INTRAVENOUS at 06:31

## 2018-03-02 RX ADMIN — PENICILLIN G SODIUM SCH MLS/HR: 5000000 INJECTION, POWDER, FOR SOLUTION INTRAMUSCULAR; INTRAVENOUS at 05:36

## 2018-03-02 RX ADMIN — ZOLPIDEM TARTRATE PRN MG: 5 TABLET, COATED ORAL at 22:00

## 2018-03-02 RX ADMIN — STANDARDIZED SENNA CONCENTRATE AND DOCUSATE SODIUM SCH TAB: 8.6; 5 TABLET, FILM COATED ORAL at 07:53

## 2018-03-02 RX ADMIN — METHADONE HYDROCHLORIDE SCH MG: 10 TABLET ORAL at 07:54

## 2018-03-02 RX ADMIN — PANTOPRAZOLE SCH MG: 40 TABLET, DELAYED RELEASE ORAL at 07:53

## 2018-03-02 RX ADMIN — HEPARIN SODIUM SCH UNITS: 10000 INJECTION, SOLUTION INTRAVENOUS; SUBCUTANEOUS at 07:56

## 2018-03-02 RX ADMIN — Medication SCH ML: at 22:01

## 2018-03-02 RX ADMIN — CYCLOBENZAPRINE HYDROCHLORIDE PRN MG: 10 TABLET, FILM COATED ORAL at 22:00

## 2018-03-02 RX ADMIN — PENICILLIN G SODIUM SCH MLS/HR: 5000000 INJECTION, POWDER, FOR SOLUTION INTRAMUSCULAR; INTRAVENOUS at 22:00

## 2018-03-02 RX ADMIN — PENICILLIN G SODIUM SCH MLS/HR: 5000000 INJECTION, POWDER, FOR SOLUTION INTRAMUSCULAR; INTRAVENOUS at 02:39

## 2018-03-02 RX ADMIN — PENICILLIN G SODIUM SCH MLS/HR: 5000000 INJECTION, POWDER, FOR SOLUTION INTRAMUSCULAR; INTRAVENOUS at 17:23

## 2018-03-02 NOTE — HHI.FPPN
Subjective


Remarks


No new changes. No fevers, n/v/d, sob. No chest pain.


 (Oniel Burleson MD, R3)





Objective


Vitals





Vital Signs








  Date Time  Temp Pulse Resp B/P (MAP) Pulse Ox O2 Delivery O2 Flow Rate FiO2


 


3/2/18 08:00 98.1 90 17 102/64 (77) 98   


 


3/2/18 05:40 98.3 80 17 110/66 (81) 97   


 


3/2/18 00:20 98.8 88 18 110/75 (87) 98   


 


3/1/18 21:50 98.7 92 17 107/74 (85) 97   


 


3/1/18 16:55 99.2 91 20 114/76 (89) 99   














I/O      


 


 3/1/18 3/1/18 3/1/18 3/2/18 3/2/18 3/2/18





 07:00 15:00 23:00 07:00 15:00 23:00


 


Intake Total 402 ml  1740 ml 925 ml 100 ml 


 


Output Total   0 ml   


 


Balance 402 ml  1740 ml 925 ml 100 ml 


 


      


 


Intake Oral   1740 ml 925 ml  


 


IV Total 402 ml    100 ml 


 


Output Urine Total   0 ml   


 


# Voids   3 2  


 


# Bowel Movements   0 0  








 (Oniel Burleson MD, R3)


Result Diagram:  


3/2/18 0635                                                                    

            3/2/18 0635





Objective Remarks


GENERAL: This is a thin though not cachectic male.  No acute distress. 


SKIN: Cool and dry.


HEAD: Atraumatic. Normocephalic. 


EYES: Pupils equal round and reactive.


ENT: Mostly edentulous.  Nose without bleeding, purulent drainage or septal 

hematoma.


CARDIOVASCULAR: 3/6 soft systolic ejection murmur best heard at the cardiac 

apex.  Regular rate and rhythm


RESPIRATORY: Clear to auscultation. Breath sounds equal bilaterally. 


GASTROINTESTINAL: Abdomen soft, TTP in RUQ, nondistended.


MUSCULOSKELETAL:   No joint tenderness.    


Back: Swollen area on back almost resolved, but TTP soft tissue midline at L4/

L5. No erythema.  No paravertebral muscle tenderness.


NEUROLOGICAL: Awake and alert. Cranial nerves II through XII intact.  Motor and 

sensory grossly within normal limits. Five out of 5 muscle strength in all 

muscle groups.  Normal speech.


 (Oniel Burleson MD, R3)





A/P


Assessment and Plan


37-year-old male with history of IV drug use, endocarditis, vertebral 

osteomyelitis; he presented with acute on chronic low back pain, subjective 

fevers.  Infectious disease consult.  Echo shows endocarditis.  Positive blood 

cultures to date all strep viridans.  Treatment as below.


Discharge Planning


Unclear at this time


Patient does not appear to have a stable home environment and likely would not 

do well with a PICC line. Likely will need 6 weeks of inpatient abx treatment.


 (Oniel Burleson MD, R3)


Attending Attestation


Patient seen and examined.  Case reviewed and discussed with the resident team.

  Agree with plan of care as discussed with me and documented in the resident 

note.


he was informed there will be no PICC line to go home with. hope his 6 weeks 

will start counting from the 27th as so far those cultures are negative


 (Sonia Turcios MD)


Problem List:  


(1) Endocarditis


ICD Codes:  I38 - Endocarditis, valve unspecified


Plan:  Echo shows a moderate to large sized mobile echodensity (0.71.4 cm) 

located on the anterior mitral valve leaflet.


Infectious disease consulted.


Positive blood cultures, see below


Antibiotics per infectious disease: Penicillin and gentamicin





Patient understands with continued IV drug use and infection, he could have 

serious morbidity and even death





(2) Bacteremia


ICD Codes:  R78.81 - Bacteremia


Status:  Acute


Plan:  Infectious disease consulted


Sensitivities in the EMR


Endocarditis as above


Repeat blood cultures 2/27 at 0600 NGTD


Continue penicillin and gentamicin


he needs to have clear blood cultures prior to any consideration of a line, etc





Antibiotic history:


Vancomycin 2/23-2/24





(3) Right calf pain


ICD Codes:  M79.661 - Pain in right lower leg


Status:  Resolved


Plan:  Ultrasound 2/25: No DVT


Continue heparin, follow platelets


consider Heme consult is any questions or doubts with his platelets





(4) Methadone maintenance therapy patient


ICD Codes:  F11.20 - Opioid dependence, uncomplicated


Status:  Chronic


Plan:  Continue methadone 30 mg daily.





(5) History of drug abuse


ICD Codes:  Z87.898 - Personal history of other specified conditions


Status:  Acute


Plan:  No IV drug use for the last 1 year per the patient is what he said 

initially but then he admitted one time using iv 2 months ago. so many abusers 

are ashamed so it is difficult to be sure of their usage patterns. he is 

already asking about a 


PICC line


He has been doing methamphetamines, snorting


urine drug screen ordered and in the EMR


Counseled cessation





(6) Anemia


ICD Codes:  D64.9 - Anemia, unspecified


Status:  Chronic


Plan:  Hemoccult stool negative


Transfuse for hemoglobin less than 7.


On his last admission he was pancytopenic


This is likely from endocarditis plus his Hepatitis





(7) Thrombocytopenia


ICD Codes:  D69.6 - Thrombocytopenia, unspecified


Status:  Chronic


Plan:  Appears to be chronic


He was pancytopenic at his last admission.


Likely from endocarditis plus Hep C


Careful with heparin


Consider hematology consult


Follow CBC





(8) Back pain


ICD Codes:  M54.9 - Dorsalgia, unspecified


Status:  Acute


Plan:  Initial Concern for recurrent vertebral osteomyelitis.


Consulted infectious disease


Consulted neurosurgery Dr. Nichols (known to patient from 6/2016)


Case discussed with infectious disease and neurosurgery.


antibiotics were not started initially. however, 4/4 blood cultures are 

positive for gram positive cocci (see above)


Possible biopsy however any back infection would likely be septic from the 

heart or have led to the endocarditis and may very well be the same organism.  


As patient is bacteremic, antibiotics per ID. starting with vancomycin


Continue methadone.  Morphine 2 mg IV every 3 hours when necessary pain 6-10.





(9) Hepatitis B infection


ICD Codes:  B19.10 - Unspecified viral hepatitis B without hepatic coma


Status:  Chronic


Plan:  not new infection. can investigate his status





(10) FEN/DVT PPX/GI PPX/Nursing Orders 


Status:  Acute


Plan:  Fluids: Tolerating by mouth


Electrolytes: Monitor and replace as needed


Nutrition: Regular diet


Prophylaxis: SCDs and heparin


 (Oniel Burleson MD, R3)





Problem Qualifiers





(1) Endocarditis:  


Qualified Codes:  I33.0 - Acute and subacute infective endocarditis


(2) Anemia:  


Qualified Codes:  D64.9 - Anemia, unspecified


(3) Back pain:  


Qualified Codes:  M54.5 - Low back pain


(4) Hepatitis B infection:  








Oniel Burleson MD, R3 Mar 2, 2018 14:24


Sonia Turcios MD Mar 3, 2018 12:22

## 2018-03-03 VITALS
HEART RATE: 91 BPM | RESPIRATION RATE: 18 BRPM | OXYGEN SATURATION: 98 % | TEMPERATURE: 96.1 F | DIASTOLIC BLOOD PRESSURE: 50 MMHG | SYSTOLIC BLOOD PRESSURE: 96 MMHG

## 2018-03-03 VITALS
HEART RATE: 92 BPM | SYSTOLIC BLOOD PRESSURE: 90 MMHG | DIASTOLIC BLOOD PRESSURE: 59 MMHG | OXYGEN SATURATION: 98 % | RESPIRATION RATE: 18 BRPM | TEMPERATURE: 98.2 F

## 2018-03-03 VITALS
TEMPERATURE: 98 F | OXYGEN SATURATION: 97 % | RESPIRATION RATE: 17 BRPM | DIASTOLIC BLOOD PRESSURE: 64 MMHG | HEART RATE: 69 BPM | SYSTOLIC BLOOD PRESSURE: 110 MMHG

## 2018-03-03 VITALS
SYSTOLIC BLOOD PRESSURE: 116 MMHG | OXYGEN SATURATION: 100 % | TEMPERATURE: 99 F | RESPIRATION RATE: 17 BRPM | DIASTOLIC BLOOD PRESSURE: 66 MMHG | HEART RATE: 95 BPM

## 2018-03-03 VITALS
SYSTOLIC BLOOD PRESSURE: 100 MMHG | HEART RATE: 88 BPM | OXYGEN SATURATION: 97 % | DIASTOLIC BLOOD PRESSURE: 55 MMHG | RESPIRATION RATE: 18 BRPM | TEMPERATURE: 98.4 F

## 2018-03-03 LAB
BUN SERPL-MCNC: 14 MG/DL (ref 7–18)
CALCIUM SERPL-MCNC: 8.3 MG/DL (ref 8.5–10.1)
CHLORIDE SERPL-SCNC: 97 MEQ/L (ref 98–107)
CREAT SERPL-MCNC: 0.82 MG/DL (ref 0.6–1.3)
ERYTHROCYTE [DISTWIDTH] IN BLOOD BY AUTOMATED COUNT: 16 % (ref 11.6–17.2)
GFR SERPLBLD BASED ON 1.73 SQ M-ARVRAT: 106 ML/MIN (ref 89–?)
GLUCOSE SERPL-MCNC: 120 MG/DL (ref 74–106)
HCO3 BLD-SCNC: 25.3 MEQ/L (ref 21–32)
HCT VFR BLD CALC: 21 % (ref 39–51)
HGB BLD-MCNC: 7.2 GM/DL (ref 13–17)
MCH RBC QN AUTO: 28.7 PG (ref 27–34)
MCHC RBC AUTO-ENTMCNC: 34.4 % (ref 32–36)
MCV RBC AUTO: 83.5 FL (ref 80–100)
PLATELET # BLD: 131 TH/MM3 (ref 150–450)
PMV BLD AUTO: 7.7 FL (ref 7–11)
RBC # BLD AUTO: 2.52 MIL/MM3 (ref 4.5–5.9)
SODIUM SERPL-SCNC: 131 MEQ/L (ref 136–145)
WBC # BLD AUTO: 4.2 TH/MM3 (ref 4–11)

## 2018-03-03 RX ADMIN — STANDARDIZED SENNA CONCENTRATE AND DOCUSATE SODIUM SCH TAB: 8.6; 5 TABLET, FILM COATED ORAL at 07:24

## 2018-03-03 RX ADMIN — Medication SCH ML: at 07:25

## 2018-03-03 RX ADMIN — PENICILLIN G SODIUM SCH MLS/HR: 5000000 INJECTION, POWDER, FOR SOLUTION INTRAMUSCULAR; INTRAVENOUS at 05:01

## 2018-03-03 RX ADMIN — HEPARIN SODIUM SCH UNITS: 10000 INJECTION, SOLUTION INTRAVENOUS; SUBCUTANEOUS at 07:25

## 2018-03-03 RX ADMIN — STANDARDIZED SENNA CONCENTRATE AND DOCUSATE SODIUM SCH TAB: 8.6; 5 TABLET, FILM COATED ORAL at 23:11

## 2018-03-03 RX ADMIN — CYCLOBENZAPRINE HYDROCHLORIDE PRN MG: 10 TABLET, FILM COATED ORAL at 23:11

## 2018-03-03 RX ADMIN — PENICILLIN G SODIUM SCH MLS/HR: 5000000 INJECTION, POWDER, FOR SOLUTION INTRAMUSCULAR; INTRAVENOUS at 13:12

## 2018-03-03 RX ADMIN — Medication SCH ML: at 23:12

## 2018-03-03 RX ADMIN — PENICILLIN G SODIUM SCH MLS/HR: 5000000 INJECTION, POWDER, FOR SOLUTION INTRAMUSCULAR; INTRAVENOUS at 08:52

## 2018-03-03 RX ADMIN — PENICILLIN G SODIUM SCH MLS/HR: 5000000 INJECTION, POWDER, FOR SOLUTION INTRAMUSCULAR; INTRAVENOUS at 01:09

## 2018-03-03 RX ADMIN — METHADONE HYDROCHLORIDE SCH MG: 10 TABLET ORAL at 07:24

## 2018-03-03 RX ADMIN — PANTOPRAZOLE SCH MG: 40 TABLET, DELAYED RELEASE ORAL at 07:25

## 2018-03-03 RX ADMIN — HEPARIN SODIUM SCH UNITS: 10000 INJECTION, SOLUTION INTRAVENOUS; SUBCUTANEOUS at 23:12

## 2018-03-03 RX ADMIN — ZOLPIDEM TARTRATE PRN MG: 5 TABLET, COATED ORAL at 23:11

## 2018-03-03 RX ADMIN — PENICILLIN G SODIUM SCH MLS/HR: 5000000 INJECTION, POWDER, FOR SOLUTION INTRAMUSCULAR; INTRAVENOUS at 23:12

## 2018-03-03 RX ADMIN — PENICILLIN G SODIUM SCH MLS/HR: 5000000 INJECTION, POWDER, FOR SOLUTION INTRAMUSCULAR; INTRAVENOUS at 17:42

## 2018-03-04 VITALS
RESPIRATION RATE: 18 BRPM | TEMPERATURE: 98.1 F | SYSTOLIC BLOOD PRESSURE: 94 MMHG | HEART RATE: 85 BPM | DIASTOLIC BLOOD PRESSURE: 55 MMHG | OXYGEN SATURATION: 99 %

## 2018-03-04 VITALS
HEART RATE: 97 BPM | TEMPERATURE: 98 F | DIASTOLIC BLOOD PRESSURE: 62 MMHG | RESPIRATION RATE: 17 BRPM | SYSTOLIC BLOOD PRESSURE: 101 MMHG | OXYGEN SATURATION: 98 %

## 2018-03-04 VITALS
HEART RATE: 89 BPM | DIASTOLIC BLOOD PRESSURE: 63 MMHG | RESPIRATION RATE: 17 BRPM | OXYGEN SATURATION: 99 % | TEMPERATURE: 98.3 F | SYSTOLIC BLOOD PRESSURE: 101 MMHG

## 2018-03-04 VITALS
RESPIRATION RATE: 17 BRPM | HEART RATE: 92 BPM | TEMPERATURE: 99.3 F | OXYGEN SATURATION: 99 % | DIASTOLIC BLOOD PRESSURE: 59 MMHG | SYSTOLIC BLOOD PRESSURE: 94 MMHG

## 2018-03-04 VITALS
SYSTOLIC BLOOD PRESSURE: 109 MMHG | TEMPERATURE: 98.6 F | OXYGEN SATURATION: 98 % | RESPIRATION RATE: 17 BRPM | HEART RATE: 89 BPM | DIASTOLIC BLOOD PRESSURE: 58 MMHG

## 2018-03-04 VITALS
HEART RATE: 106 BPM | DIASTOLIC BLOOD PRESSURE: 62 MMHG | RESPIRATION RATE: 18 BRPM | SYSTOLIC BLOOD PRESSURE: 110 MMHG | TEMPERATURE: 97.8 F | OXYGEN SATURATION: 100 %

## 2018-03-04 RX ADMIN — PENICILLIN G SODIUM SCH MLS/HR: 5000000 INJECTION, POWDER, FOR SOLUTION INTRAMUSCULAR; INTRAVENOUS at 22:45

## 2018-03-04 RX ADMIN — HEPARIN SODIUM SCH UNITS: 10000 INJECTION, SOLUTION INTRAVENOUS; SUBCUTANEOUS at 08:32

## 2018-03-04 RX ADMIN — PENICILLIN G SODIUM SCH MLS/HR: 5000000 INJECTION, POWDER, FOR SOLUTION INTRAMUSCULAR; INTRAVENOUS at 05:07

## 2018-03-04 RX ADMIN — PENICILLIN G SODIUM SCH MLS/HR: 5000000 INJECTION, POWDER, FOR SOLUTION INTRAMUSCULAR; INTRAVENOUS at 02:43

## 2018-03-04 RX ADMIN — PENICILLIN G SODIUM SCH MLS/HR: 5000000 INJECTION, POWDER, FOR SOLUTION INTRAMUSCULAR; INTRAVENOUS at 17:44

## 2018-03-04 RX ADMIN — Medication SCH ML: at 08:32

## 2018-03-04 RX ADMIN — Medication SCH ML: at 23:06

## 2018-03-04 RX ADMIN — PENICILLIN G SODIUM SCH MLS/HR: 5000000 INJECTION, POWDER, FOR SOLUTION INTRAMUSCULAR; INTRAVENOUS at 09:10

## 2018-03-04 RX ADMIN — METHADONE HYDROCHLORIDE SCH MG: 10 TABLET ORAL at 08:32

## 2018-03-04 RX ADMIN — PANTOPRAZOLE SCH MG: 40 TABLET, DELAYED RELEASE ORAL at 08:32

## 2018-03-04 RX ADMIN — PENICILLIN G SODIUM SCH MLS/HR: 5000000 INJECTION, POWDER, FOR SOLUTION INTRAMUSCULAR; INTRAVENOUS at 13:35

## 2018-03-04 RX ADMIN — STANDARDIZED SENNA CONCENTRATE AND DOCUSATE SODIUM SCH TAB: 8.6; 5 TABLET, FILM COATED ORAL at 22:44

## 2018-03-04 RX ADMIN — STANDARDIZED SENNA CONCENTRATE AND DOCUSATE SODIUM SCH TAB: 8.6; 5 TABLET, FILM COATED ORAL at 09:09

## 2018-03-04 RX ADMIN — HEPARIN SODIUM SCH UNITS: 10000 INJECTION, SOLUTION INTRAVENOUS; SUBCUTANEOUS at 22:45

## 2018-03-04 RX ADMIN — ZOLPIDEM TARTRATE PRN MG: 5 TABLET, COATED ORAL at 22:44

## 2018-03-04 RX ADMIN — CYCLOBENZAPRINE HYDROCHLORIDE PRN MG: 10 TABLET, FILM COATED ORAL at 22:44

## 2018-03-04 NOTE — HHI.FPPN
Subjective


Remarks


No new changes this morning.  He has no new complaints.  No fever, chills, 

nausea, vomiting, chest pain, shortness of breath.


 (Oniel Burleson MD, R3)





Objective


Vitals





Vital Signs








  Date Time  Temp Pulse Resp B/P (MAP) Pulse Ox O2 Delivery O2 Flow Rate FiO2


 


3/4/18 08:02 98.3 89 17 101/63 (76) 99   


 


3/4/18 05:25 98.0 97 17 101/62 (75) 98   


 


3/4/18 00:40 98.6 89 17 109/58 (75) 98   


 


3/3/18 20:19 99.0 95 17 116/66 (83) 100   


 


3/3/18 15:50 98.2 92 18 90/59 (69) 98   


 


3/3/18 12:24 96.1 91 18 96/50 (65) 98   














I/O      


 


 3/3/18 3/3/18 3/3/18 3/4/18 3/4/18 3/4/18





 07:00 15:00 23:00 07:00 15:00 23:00


 


Intake Total 2000 ml 820 ml 200 ml 360 ml  


 


Output Total  600 ml    


 


Balance 2000 ml 220 ml 200 ml 360 ml  


 


      


 


Intake Oral 1800 ml 720 ml  360 ml  


 


IV Total 200 ml 100 ml 200 ml   


 


Output Urine Total  600 ml    


 


# Voids 3   5  


 


# Bowel Movements 0     








 (Oniel Burleson MD, R3)


Result Diagram:  


3/3/18 0515                                                                    

            3/3/18 0515





Objective Remarks


GENERAL: This is a thin though not cachectic male.  No acute distress. 


SKIN: Cool and dry.


HEAD: Atraumatic. Normocephalic. 


EYES: Pupils equal round and reactive.


ENT: Mostly edentulous.  Nose without bleeding, purulent drainage or septal 

hematoma.


CARDIOVASCULAR: 3/6 soft systolic ejection murmur best heard at the cardiac 

apex.  Regular rate and rhythm


RESPIRATORY: Clear to auscultation. Breath sounds equal bilaterally. 


GASTROINTESTINAL: Abdomen soft, TTP in RUQ, nondistended.


MUSCULOSKELETAL:   No joint tenderness.    


Back: Swollen area on back almost resolved, but TTP soft tissue midline at L4/

L5. No erythema.  No paravertebral muscle tenderness.


NEUROLOGICAL: Awake and alert. Cranial nerves II through XII intact.  Motor and 

sensory grossly within normal limits. Five out of 5 muscle strength in all 

muscle groups.  Normal speech.


 (Oniel Burleson MD, R3)





A/P


Assessment and Plan


37-year-old male with history of IV drug use, endocarditis, vertebral 

osteomyelitis; he presented with acute on chronic low back pain, subjective 

fevers.  Infectious disease consult.  Echo shows endocarditis.  Positive blood 

cultures to date all strep viridans.  Treatment as below.


Discharge Planning


Unclear at this time


Patient does not appear to have a stable home environment and likely would not 

do well with a PICC line. Likely will need 6 weeks of inpatient abx treatment.


 (Oniel Burleson MD, R3)


Attending Attestation


Patient seen and examined.  Case reviewed and discussed with the resident team.

  Agree with plan of care as discussed with me and documented in the resident 

note.


he will need 6 weeks total. consider an anemia workup. he has a terrible diet 

and could have some Fe deficiency vs anemia of chronic disease or bone marrow 

suppression from another cause


 (Sonia Turcios MD)


Problem List:  


(1) Endocarditis


ICD Codes:  I38 - Endocarditis, valve unspecified


Plan:  Echo shows a moderate to large sized mobile echodensity (0.71.4 cm) 

located on the anterior mitral valve leaflet.


Infectious disease consulted.


Positive blood cultures, see below


Antibiotics per infectious disease: Penicillin and gentamicin





Patient understands with continued IV drug use and infection, he could have 

serious morbidity and even death





(2) Bacteremia


ICD Codes:  R78.81 - Bacteremia


Status:  Acute


Plan:  Infectious disease consulted


Sensitivities in the EMR


Endocarditis as above


Repeat blood cultures 2/27 at 0600 NGTDx4


Continue penicillin and gentamicin


he needs to have clear blood cultures prior to any consideration of a line, etc





Antibiotic history:


Vancomycin 2/23-2/24





(3) Right calf pain


ICD Codes:  M79.661 - Pain in right lower leg


Status:  Resolved


Plan:  Ultrasound 2/25: No DVT


Continue heparin, follow platelets


consider Heme consult is any questions or doubts with his platelets





(4) Methadone maintenance therapy patient


ICD Codes:  F11.20 - Opioid dependence, uncomplicated


Status:  Chronic


Plan:  Continue methadone 30 mg daily.





(5) History of drug abuse


ICD Codes:  Z87.898 - Personal history of other specified conditions


Status:  Acute


Plan:  No IV drug use for the last 1 year per the patient is what he said 

initially but then he admitted one time using iv 2 months ago. so many abusers 

are ashamed so it is difficult to be sure of their usage patterns.  He 

continues to ask for PICC line


He has been doing methamphetamines, snorting


urine drug screen ordered and in the EMR


Counseled cessation





(6) Anemia


ICD Codes:  D64.9 - Anemia, unspecified


Status:  Chronic


Plan:  Hemoccult stool negative


Transfuse for hemoglobin less than 7.


On his last admission he was pancytopenic


This is likely from endocarditis plus his Hepatitis





(7) Thrombocytopenia


ICD Codes:  D69.6 - Thrombocytopenia, unspecified


Status:  Chronic


Plan:  Appears to be chronic


He was pancytopenic at his last admission.


Likely from endocarditis plus Hep C


Careful with heparin


Consider hematology consult


Follow CBC





(8) Back pain


ICD Codes:  M54.9 - Dorsalgia, unspecified


Status:  Acute


Plan:  Much improved





HISTORY: 


Initial Concern for recurrent vertebral osteomyelitis.


Consulted infectious disease


Consulted neurosurgery Dr. Nichols (known to patient from 6/2016)


Case discussed with infectious disease and neurosurgery.


antibiotics were not started initially. however, 4/4 blood cultures are 

positive for gram positive cocci (see above)


Possible biopsy however any back infection would likely be septic from the 

heart or have led to the endocarditis and may very well be the same organism.  


As patient is bacteremic, antibiotics per ID. starting with vancomycin


Continue methadone.  Morphine 2 mg IV every 3 hours when necessary pain 6-10.





(9) Hepatitis B infection


ICD Codes:  B19.10 - Unspecified viral hepatitis B without hepatic coma


Status:  Chronic


Plan:  not new infection. can investigate his status





(10) FEN/DVT PPX/GI PPX/Nursing Orders 


Status:  Acute


Plan:  Fluids: Tolerating by mouth


Electrolytes: Monitor and replace as needed


Nutrition: Regular diet


Prophylaxis: SCDs and heparin


 (Oniel Burleson MD, R3)





Problem Qualifiers





(1) Endocarditis:  


Qualified Codes:  I33.0 - Acute and subacute infective endocarditis


(2) Anemia:  


Qualified Codes:  D64.9 - Anemia, unspecified


(3) Back pain:  


Qualified Codes:  M54.5 - Low back pain


(4) Hepatitis B infection:  








Oniel Burleson MD, R3 Mar 4, 2018 09:12


Sonia Turcios MD Mar 4, 2018 13:53

## 2018-03-05 VITALS
RESPIRATION RATE: 18 BRPM | OXYGEN SATURATION: 100 % | TEMPERATURE: 97.6 F | SYSTOLIC BLOOD PRESSURE: 105 MMHG | DIASTOLIC BLOOD PRESSURE: 68 MMHG | HEART RATE: 97 BPM

## 2018-03-05 VITALS
OXYGEN SATURATION: 98 % | RESPIRATION RATE: 16 BRPM | SYSTOLIC BLOOD PRESSURE: 106 MMHG | HEART RATE: 87 BPM | DIASTOLIC BLOOD PRESSURE: 61 MMHG | TEMPERATURE: 98.2 F

## 2018-03-05 VITALS
DIASTOLIC BLOOD PRESSURE: 57 MMHG | RESPIRATION RATE: 17 BRPM | HEART RATE: 88 BPM | SYSTOLIC BLOOD PRESSURE: 104 MMHG | OXYGEN SATURATION: 98 % | TEMPERATURE: 97.9 F

## 2018-03-05 VITALS
SYSTOLIC BLOOD PRESSURE: 111 MMHG | TEMPERATURE: 97.5 F | RESPIRATION RATE: 18 BRPM | DIASTOLIC BLOOD PRESSURE: 72 MMHG | HEART RATE: 95 BPM | OXYGEN SATURATION: 96 %

## 2018-03-05 VITALS
DIASTOLIC BLOOD PRESSURE: 61 MMHG | TEMPERATURE: 97.3 F | SYSTOLIC BLOOD PRESSURE: 134 MMHG | OXYGEN SATURATION: 97 % | RESPIRATION RATE: 20 BRPM | HEART RATE: 81 BPM

## 2018-03-05 VITALS
OXYGEN SATURATION: 97 % | TEMPERATURE: 98.1 F | DIASTOLIC BLOOD PRESSURE: 56 MMHG | HEART RATE: 95 BPM | RESPIRATION RATE: 17 BRPM | SYSTOLIC BLOOD PRESSURE: 101 MMHG

## 2018-03-05 LAB
% SATURATION IRON PROFILE: 14.8 % (ref 20–50)
BUN SERPL-MCNC: 12 MG/DL (ref 7–18)
CALCIUM SERPL-MCNC: 8.3 MG/DL (ref 8.5–10.1)
CHLORIDE SERPL-SCNC: 98 MEQ/L (ref 98–107)
CREAT SERPL-MCNC: 0.85 MG/DL (ref 0.6–1.3)
ERYTHROCYTE [DISTWIDTH] IN BLOOD BY AUTOMATED COUNT: 16.2 % (ref 11.6–17.2)
FERRITIN SERPL-MCNC: 406 NG/ML (ref 26–388)
FOLATE SERPL-MCNC: 17.7 NG/ML (ref 3.1–17.5)
GFR SERPLBLD BASED ON 1.73 SQ M-ARVRAT: 102 ML/MIN (ref 89–?)
GLUCOSE SERPL-MCNC: 105 MG/DL (ref 74–106)
HCO3 BLD-SCNC: 29.2 MEQ/L (ref 21–32)
HCT VFR BLD CALC: 22.7 % (ref 39–51)
HGB BLD-MCNC: 7.9 GM/DL (ref 13–17)
IRON (FE): 34 MCG/DL (ref 65–175)
MCH RBC QN AUTO: 29.1 PG (ref 27–34)
MCHC RBC AUTO-ENTMCNC: 34.5 % (ref 32–36)
MCV RBC AUTO: 84.4 FL (ref 80–100)
PLATELET # BLD: 174 TH/MM3 (ref 150–450)
PMV BLD AUTO: 7.8 FL (ref 7–11)
RBC # BLD AUTO: 2.69 MIL/MM3 (ref 4.5–5.9)
RETIC #: 103.2 MIL/L (ref 20–150)
RETICS/RBC NFR: 3.7 % (ref 0.4–3)
SODIUM SERPL-SCNC: 133 MEQ/L (ref 136–145)
TIBC SERPL-MCNC: 230 MCG/DL (ref 250–450)
VIT B12 SERPL-MCNC: (no result) PG/ML (ref 193–986)
WBC # BLD AUTO: 4.4 TH/MM3 (ref 4–11)

## 2018-03-05 RX ADMIN — STANDARDIZED SENNA CONCENTRATE AND DOCUSATE SODIUM SCH TAB: 8.6; 5 TABLET, FILM COATED ORAL at 20:01

## 2018-03-05 RX ADMIN — ZOLPIDEM TARTRATE PRN MG: 5 TABLET, COATED ORAL at 20:09

## 2018-03-05 RX ADMIN — STANDARDIZED SENNA CONCENTRATE AND DOCUSATE SODIUM SCH TAB: 8.6; 5 TABLET, FILM COATED ORAL at 09:58

## 2018-03-05 RX ADMIN — METHADONE HYDROCHLORIDE SCH MG: 10 TABLET ORAL at 09:58

## 2018-03-05 RX ADMIN — CYCLOBENZAPRINE HYDROCHLORIDE PRN MG: 10 TABLET, FILM COATED ORAL at 20:10

## 2018-03-05 RX ADMIN — PANTOPRAZOLE SCH MG: 40 TABLET, DELAYED RELEASE ORAL at 09:57

## 2018-03-05 RX ADMIN — HEPARIN SODIUM SCH UNITS: 10000 INJECTION, SOLUTION INTRAVENOUS; SUBCUTANEOUS at 20:01

## 2018-03-05 RX ADMIN — PENICILLIN G SODIUM SCH MLS/HR: 5000000 INJECTION, POWDER, FOR SOLUTION INTRAMUSCULAR; INTRAVENOUS at 06:05

## 2018-03-05 RX ADMIN — PENICILLIN G SODIUM SCH MLS/HR: 5000000 INJECTION, POWDER, FOR SOLUTION INTRAMUSCULAR; INTRAVENOUS at 22:49

## 2018-03-05 RX ADMIN — HEPARIN SODIUM SCH UNITS: 10000 INJECTION, SOLUTION INTRAVENOUS; SUBCUTANEOUS at 09:57

## 2018-03-05 RX ADMIN — PENICILLIN G SODIUM SCH MLS/HR: 5000000 INJECTION, POWDER, FOR SOLUTION INTRAMUSCULAR; INTRAVENOUS at 17:54

## 2018-03-05 RX ADMIN — PENICILLIN G SODIUM SCH MLS/HR: 5000000 INJECTION, POWDER, FOR SOLUTION INTRAMUSCULAR; INTRAVENOUS at 13:30

## 2018-03-05 RX ADMIN — Medication SCH ML: at 20:02

## 2018-03-05 RX ADMIN — PENICILLIN G SODIUM SCH MLS/HR: 5000000 INJECTION, POWDER, FOR SOLUTION INTRAMUSCULAR; INTRAVENOUS at 09:59

## 2018-03-05 RX ADMIN — PENICILLIN G SODIUM SCH MLS/HR: 5000000 INJECTION, POWDER, FOR SOLUTION INTRAMUSCULAR; INTRAVENOUS at 02:43

## 2018-03-05 RX ADMIN — Medication SCH ML: at 09:59

## 2018-03-05 NOTE — RADRPT
EXAM DATE/TIME:  03/05/2018 17:23 

 

[facility] COMPARISON:     

CHEST SINGLE AP, February 22, 2018, 7:48.

 

                     

INDICATIONS :     

PICC line placement.

                     

 

MEDICAL HISTORY :            

Hepatitis C. Hepatitis B.   

 

SURGICAL HISTORY :     

None.   

 

ENCOUNTER:     

Initial                                        

 

ACUITY:     

1 day      

 

PAIN SCORE:     

0/10

 

LOCATION:     

Bilateral chest 

 

FINDINGS:     

A single view of the chest demonstrates the lungs to be symmetrically aerated with worsening bibasila
r platelike atelectatic changes. Right upper extremity PICC line with the tip projecting over the suraj
tral venous system. Heart size is normal. Osseous structures are intact.

 

CONCLUSION:     

1. Hypoinflation with worsening linear platelike atelectatic changes about both hemidiaphragms.

2. Right upper extremity PICC line with the tip projecting over the central venous system

 

 

 

 Aidan Mckeon MD on March 05, 2018 at 17:33           

Board Certified Radiologist.

 This report was verified electronically.

## 2018-03-05 NOTE — HHI.FF
Face to Face Verification


Diagnosis:  


(1) Streptococcal endocarditis


(2) Endocarditis of mitral valve


Home Health Nursing








Order: Medical education





 Signs/symptoms of disease process





 Medication education-adverse effect





 Nursing assessment with vital signs





 IV medication administration

















I have seen patient Noah Curran on 3/5/18. My clinical findings support 

the need for the requested home health care services because: pt has Strep 

viridans endocarditis and requires IV antibiotics that he will require nursing 

assistance with.








 Ltd mobility - disease progression





 Deconditioned w/ increased weakness





 Med compliance is questionable





 Limited ability to care for self





 Need for psychosocial assistance





 Impaired cognition/judgement





 Infection w/ risk of complications





 Injectable med education/admin














I certify that my clinical findings support that this patient is homebound 

because: pt has Strep viridans endocarditis infection requiring IV antibiotics 

for 6 weeks.








 Impaired cognitive ability/safety





 Need for psychosocial assistance





 Poor cardiac reserve

















Phill Christina MD R1 Mar 5, 2018 15:10

## 2018-03-05 NOTE — HHI.FPPN
Subjective


Remarks


Mr Curran is eating a bit better. He had lost weight as an outpt. I asked about 

possible vitamin deficiencies as he is still anemic (labs pending for today). 

He was eating some animal products so his B12 should be fine.


He does want to leave the hospital when he can and hopes for a PICC line. he 

understands that he will not be able to go home for quite a long time


otherwise he has no complaints





Objective


Vitals





Vital Signs








  Date Time  Temp Pulse Resp B/P (MAP) Pulse Ox O2 Delivery O2 Flow Rate FiO2


 


3/5/18 08:19 98.2 87 16 106/61 (76) 98   


 


3/5/18 05:20 97.9 88 17 104/57 (73) 98   


 


3/5/18 00:43 98.1 95 17 101/56 (71) 97   


 


3/4/18 21:04 99.3 92 17 94/59 (71) 99   


 


3/4/18 16:27 98.1 85 18 94/55 (68) 99   


 


3/4/18 11:50 97.8 106 18 110/62 (78) 100   


 


3/4/18 09:35   18     














I/O      


 


 3/4/18 3/4/18 3/4/18 3/5/18 3/5/18 3/5/18





 07:00 15:00 23:00 07:00 15:00 23:00


 


Intake Total 360 ml 200 ml  600 ml  


 


Output Total    300 ml  


 


Balance 360 ml 200 ml  300 ml  


 


      


 


Intake Oral 360 ml   600 ml  


 


IV Total  200 ml    


 


Output Urine Total    300 ml  


 


# Voids 5   3  








Result Diagram:  


3/3/18 0515                                                                    

            3/3/18 0515





Objective Remarks


GENERAL: This is a thin though not cachectic male.  No acute distress. spends 

most of his time lying in bed


SKIN: Cool and dry.


HEAD: Atraumatic. Normocephalic. 


EYES: Pupils equal round and reactive.


ENT: Mostly edentulous.  Nose without bleeding, purulent drainage or septal 

hematoma.


CARDIOVASCULAR: 3/6 soft systolic ejection murmur best heard at the cardiac 

apex.  Regular rate and rhythm


RESPIRATORY: Clear to auscultation. Breath sounds equal bilaterally. 


GASTROINTESTINAL: Abdomen soft, TTP in RUQ, nondistended.


MUSCULOSKELETAL:   No joint tenderness.    


Back: Swollen area on back almost resolved, but TTP soft tissue midline at L4/

L5. No erythema.  No paravertebral muscle tenderness.


NEUROLOGICAL: Awake and alert. Cranial nerves II through XII intact.  Motor and 

sensory grossly within normal limits. Five out of 5 muscle strength in all 

muscle groups.  Normal speech.


Urinary Catheter:  No


Vascular Central Line Catheter:  No





A/P


Assessment and Plan


37-year-old male with history of IV drug use, endocarditis, vertebral 

osteomyelitis; he presented with acute on chronic low back pain, subjective 

fevers.  Infectious disease consult.  Echo shows endocarditis.  Positive blood 

cultures to date all strep viridans.  Treatment as below. Last negative blood 

cultures were drawn 2/27.


Discharge Planning


Unclear at this time


Patient does not appear to have a stable home environment and likely would not 

do well with a PICC line. Likely will need 6 weeks of inpatient abx treatment.


Problem List:  


(1) Endocarditis


ICD Codes:  I38 - Endocarditis, valve unspecified


Status:  Acute


Plan:  Echo showed a moderate to large sized mobile echodensity (0.71.4 cm) 

located on the anterior mitral valve leaflet.








Infectious disease consulted


Sensitivities in the EMR


Repeat blood cultures 2/27 at 0600 NGTDx4


Continue penicillin and gentamicin


he needs to have clear blood cultures prior to any consideration of a line, 

etc. with his social history of having a meth user as a roommate plus prior 

history of iv drug abuse as late as a few months ago, he will not be treated as 

an outpt as the risks are too high 





Antibiotic history:


Vancomycin 2/23-2/24





Patient understands with continued IV drug use and infection, he could have 

serious morbidity and even death





(2) Methadone maintenance therapy patient


ICD Codes:  F11.20 - Opioid dependence, uncomplicated


Status:  Chronic


Plan:  Continue methadone 30 mg daily.





(3) Anemia


ICD Codes:  D64.9 - Anemia, unspecified


Status:  Chronic


Plan:  Hemoccult stool negative


Transfuse for hemoglobin less than 7.


On his last admission he was pancytopenic


This is likely from endocarditis plus his Hepatitis


ordered anemia workup and CBC today. 


consider Heme consult is needed





(4) Thrombocytopenia


ICD Codes:  D69.6 - Thrombocytopenia, unspecified


Status:  Chronic


Plan:  Appears to be chronic, is stable and improving now


He was pancytopenic at his last admission.


Likely from endocarditis plus Hep C


Careful with heparin


Consider hematology consult


Follow CBC





(5) Back pain


ICD Codes:  M54.9 - Dorsalgia, unspecified


Status:  Chronic


Plan:  Much improved





HISTORY: 


Initial Concern for recurrent vertebral osteomyelitis.


Consulted infectious disease


Consulted neurosurgery Dr. Nichols (known to patient from 6/2016)


Case discussed with infectious disease and neurosurgery.


antibiotics were not started initially. however, 4/4 blood cultures are 

positive for gram positive cocci (see above)


Possible biopsy however any back infection would likely be septic from the 

heart or have led to the endocarditis and may very well be the same organism.  


As patient is bacteremic, antibiotics per ID. starting with vancomycin


Continue methadone.  Morphine 2 mg IV every 3 hours when necessary pain 6-10.





(6) FEN/DVT PPX/GI PPX/Nursing Orders 


Status:  Acute


Plan:  Fluids: Tolerating by mouth


Electrolytes: Monitor and replace as needed


Nutrition: Regular diet


Prophylaxis: SCDs and heparin at q 12 because of slightly low platelets








Problem Qualifiers





(1) Endocarditis:  


Qualified Codes:  I33.0 - Acute and subacute infective endocarditis


(2) Anemia:  


Qualified Codes:  D64.9 - Anemia, unspecified


(3) Back pain:  


Qualified Codes:  M54.5 - Low back pain








Sonia Turcios MD Mar 5, 2018 08:47

## 2018-03-05 NOTE — HHI.IDPN
Subjective


Subjective


Remarks


Mr. 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 

presents with a three-week history of acute low back pain and subjective 

fevers. He does have chronic low back pain but reports an increase in the 

intensity of pain over last 3 weeks. This pain is worsened with movement and is 

relieved with ibuprofen.  Patient reports subjective fevers denies any chills 

is unsure about night sweats.  For work, he climbs trees and cuts limbs.  





Patient reports history of trauma with a tree limb that fell on him.


He reports nausea but no vomiting or diarrhea


Patient denies any bowel bladder incontinence.  He does report a change in his 

stream of urine for the last 3 weeks but denies any incontinence.


Patient denies any loss of sensation in the genitourinary area.


Patient denies any lower extremity or upper extremity weakness at the present 

time.


Patient denies any cardiorespiratory symptoms at the present time.


Patient reports that he has chronic numbness and tingling in his feet 

bilaterally but this is not new.


Patient denies any headache change in vision.


He denies any IV drug abuse but admits to doing oral or snorting medications.  

He reports taking methadone 30 mg per day.


He also reports snorting methamphetamine 2-3 times a week for the past 1-2 

months.





Due to his presenting symptoms and prior history and MRI of the spine was done 

which shows soft tissue density in the lumbar region but no discrete abscess.  

No obvious vertebral bone involvement or obvious fluid collection that can be 

drained at the present time.  Blood cultures have been drawn.  A 2-D echo is 

pending.  I discussed the case with Dr. Burleson and requested that no 

antibiotics be given to the patient unless the patient has positive cultures or 

obvious signs of sepsis.  I would like a WBC scan to be done to decide the 

further course of action.





Notes reviewed


No fevers


Ambulating in room.


Wishes to go home.


No rash


No diarrhea


Antibiotics





Current Medications








 Medications


  (Trade)  Dose


 Ordered  Sig/Eduardo


 Route  Start Time


 Stop Time Status Last Admin


 


  (NS Flush)  2 ml  UNSCH  PRN


 IV FLUSH  2/22/18 14:00


     


 


 


  (NS Flush)  2 ml  BID


 IV FLUSH  2/22/18 21:00


    3/5/18 09:59


 


 


  (Zofran Inj)  4 mg  Q6H  PRN


 IVP  2/22/18 14:00


    2/28/18 21:21


 


 


  (Ambien)  5 mg  HS  PRN


 PO  2/22/18 21:00


    3/4/18 22:44


 


 


  (Narcan Inj)  0.4 mg  UNSCH  PRN


 IV PUSH  2/22/18 14:00


     


 


 


  (Nadya-Colace)  1 tab  BID


 PO  2/22/18 21:00


    3/5/18 09:58


 


 


  (Milk Of


 Magnesia Liq)  30 ml  Q12H  PRN


 PO  2/22/18 14:00


     


 


 


  (Senokot)  17.2 mg  Q12H  PRN


 PO  2/22/18 14:00


     


 


 


  (Dulcolax Supp)  10 mg  DAILY  PRN


 RECTAL  2/22/18 14:00


     


 


 


  (Lactulose Liq)  30 ml  DAILY  PRN


 PO  2/22/18 14:00


     


 


 


  (Dolophine)  30 mg  DAILY


 PO  2/23/18 09:00


    3/5/18 09:58


 


 


  (Morphine Inj)  2 mg  Q3H  PRN


 IV PUSH  2/22/18 15:00


     


 


 


  (Flexeril)  5 mg  Q8H  PRN


 PO  2/22/18 20:15


    3/4/18 22:44


 


 


  (Pill Splitter)  1 ea  UNSCH  PRN


 OTHER  2/22/18 20:30


     


 


 


  (Tylenol)  500 mg  Q6H  PRN


 PO  2/23/18 12:45


    2/26/18 14:15


 


 


  (Protonix)  40 mg  DAILY


 PO  2/23/18 14:30


    3/5/18 09:57


 


 


  (Heparin Inj)  5,000 units  Q12HR


 SQ  2/25/18 21:00


    3/5/18 09:57


 


 


 Penicillin G


 Sodium [id number]


 units/Sodium


 Chloride  100 ml @ 


 200 mls/hr  Q4H


 IV  2/25/18 18:00


    3/5/18 13:30


 








Lines


PIV


Past Medical History


Mitral valve endocarditis in September 2016


Vertebral osteomyelitis in July 2016


Chronic back pain


Polysubstance IV drug abuse


Tobacco abuse


Past Surgical History


Right tibia fracture repair with plates


Allergies:  


Coded Allergies:  


     *MDRO Multi-Drug Resistant Organism (Verified  Adverse Reaction, Unknown, 2 /5/18)


 MRSA (sputum & blood) - 6/25/13


 MRSA (wound) - 9/29/08


 **MRSA PCR Screen POSITIVE - 6/28/2016**





Objective


.





Vital Signs








  Date Time  Temp Pulse Resp B/P (MAP) Pulse Ox O2 Delivery O2 Flow Rate FiO2


 


3/5/18 12:02 97.5 95 18 111/72 (85) 96   


 


3/5/18 08:19 98.2 87 16 106/61 (76) 98   


 


3/5/18 05:20 97.9 88 17 104/57 (73) 98   


 


3/5/18 00:43 98.1 95 17 101/56 (71) 97   


 


3/4/18 21:04 99.3 92 17 94/59 (71) 99   


 


3/4/18 16:27 98.1 85 18 94/55 (68) 99   








.





Laboratory Tests








Test


  3/5/18


11:54 3/5/18


11:55


 


Reticulocyte Count 3.7 %  


 


Absolute Reticulocyte Count 103.2 MIL/L  


 


White Blood Count  4.4 TH/MM3 


 


Red Blood Count  2.69 MIL/MM3 


 


Hemoglobin  7.9 GM/DL 


 


Hematocrit  22.7 % 


 


Mean Corpuscular Volume  84.4 FL 


 


Mean Corpuscular Hemoglobin  29.1 PG 


 


Mean Corpuscular Hemoglobin


Concent 


  34.5 % 


 


 


Red Cell Distribution Width  16.2 % 


 


Platelet Count  174 TH/MM3 


 


Mean Platelet Volume  7.8 FL 








Laboratory Tests








Test


  3/5/18


11:54


 


Blood Urea Nitrogen 12 MG/DL 


 


Creatinine 0.85 MG/DL 


 


Random Glucose 105 MG/DL 


 


Calcium Level 8.3 MG/DL 


 


Sodium Level 133 MEQ/L 


 


Potassium Level 3.8 MEQ/L 


 


Chloride Level 98 MEQ/L 


 


Carbon Dioxide Level 29.2 MEQ/L 


 


Anion Gap 6 MEQ/L 


 


Estimat Glomerular Filtration


Rate 102 ML/MIN 


 


 


Iron Level 34 MCG/DL 


 


Total Iron Binding Capacity 230 MCG/DL 


 


Percent Iron Saturation 14.8 % 


 


Ferritin 406 NG/ML 


 


Vitamin B12 Level


  GREATER THAN


2000 PG/ML


 


Folate 17.7 NG/ML 








Imaging














Chest X-Ray 2/22/18 0731 Signed





Impressions: 





 Service Date/Time:  Thursday, February 22, 2018 07:48 - CONCLUSION:  1. 

Minimal 





 basilar atelectasis on the left. The lungs are otherwise clear.     Jared Padron MD 


 


Tumor Localization 2/22/18 0000 Signed





Impressions: 





 Service Date/Time:  Thursday, February 22, 2018 16:25 - CONCLUSION: Normal 





 examination.       Sheldon Spears MD 


 


Lumbar Spine MRI 2/22/18 0000 Signed





Impressions: 





 Service Date/Time:  Thursday, February 22, 2018 09:36 - CONCLUSION:  1. 

Evidence 





 of interval development of a superior right parasagittal extrusion of the disc 





 at the L4-5 level with enhancement in the extruded fragment.  There is 

extension 





 into the neural foramen on right side with neural impingement.  There is no 





 significant deformity of the thecal sac and.. 2. Stable broad-based bulging of 





 the L5-S1 disc and impingement of neural foraminal side. 3. Mild enhancement 

in 





 the posterior soft tissues of the lower lumbar region without discrete abscess 





 formation.     Ken Sykes MD 








Physical Exam


GENERAL:   Thin built, awake and alert, NAD


SKIN: Dirt and mud stuck on his palms, fingers.  No rash 


HEAD: Atraumatic. Normocephalic. No temporal or scalp tenderness.


EYES: Pupils equal round and reactive. Extraocular motions intact. No scleral 

icterus. No injection or drainage. 


ENT: Nose without bleeding, purulent drainage or septal hematoma. Moist mucosa


NECK: Trachea midline. Supple, nontender, no meningeal signs.


CARDIOVASCULAR: HS audible.


RESPIRATORY: Clear to auscultation. Breath sounds equal bilaterally. No wheezes

, rales, or rhonchi.  


GASTROINTESTINAL: Abdomen soft, non-tender, nondistended. 


MUSCULOSKELETAL: Extremities without clubbing, cyanosis, or edema. 


NEUROLOGICAL: Awake and alert. Non focal exam.


Psych cooperative


IV line sites with no e.o infection.





Assessment & Plan


Remarks


Assessment and Plan


Sepsis


Strep viridans bacteremia high grade


Aortic valve endocarditis.


Possible early discitis


Soft tissue swelling in the lumbar region possible early infective myositis


Previous history of endocarditis 


   -  concern with new IE


Previous history of discitis and epidural abscess


Fever





Recommendations:


Continue Pen G 


Will need infusion pump for Pen G.


Post hospital infusion orders in chart. ANSELMO aware.


d.w patient about Pen G on pump plan.


await insurance approval.


await Home health company visit.


Once approved ok to place a PICC line and discharge patient home with Pen G on 

pump.


Will sign off. If any issues with home health infusions or change in clinical 

condition please call back.











Kiera Lucas MD Mar 5, 2018 14:42

## 2018-03-05 NOTE — HHI.DCPOC
Discharge Care Plan


Goals to Promote Your Health


* To prevent worsening of your condition and complications, please take 

medications as prescribed, including your IV antibiotics.


* To maintain your health at the optimal level, please follow up with your 

primary doctor in 1 week.


Directions to Meet Your Goals


*** Take your medications as prescribed


*** Follow your dietary instruction


*** Follow activity as directed








*** Keep your appointments as scheduled


*** Take your immunizations and boosters as scheduled


*** If your symptoms worsen call your PCP, if no PCP go to Urgent Care Center 

or Emergency Room***


*** Smoking is Dangerous to Your Health. Avoid second hand smoke***


***Call the 24-hour hour crisis hotline for domestic abuse at 1-203.651.4359***











Phill Christina MD R1 Mar 5, 2018 14:22

## 2018-03-05 NOTE — HHI.PR
Addendum to Inpatient Note


Addendum Reason:  Additional Documentation


Additional Information


Mr Curran will be here for 5-6 weeks finishing his iv abx. I will be happy to 

take him back on my Family Medicine service a few days prior to discharge so 

Family medicine can discharge him. Thanks so much for everything!











Sonia Turcios MD Mar 5, 2018 12:16

## 2018-03-05 NOTE — HHI.FF
__________________________________________________





Infusion Therapy


Location of Infusion Therapy:  [facility] IV Infusion Order


Patient Information


Appointment Date:  Mar 5, 2018


Patient Weight


70.4 kg


Diagnosis:  


Diagnosis


Strep viridans Tricuspid valve endocarditis


Early discitis based on imaging and clinical findings.


Coded Allergies:  


     *MDRO Multi-Drug Resistant Organism (Verified  Adverse Reaction, Unknown, 2 /5/18)


 MRSA (sputum & blood) - 6/25/13


 MRSA (wound) - 9/29/08


 **MRSA PCR Screen POSITIVE - 6/28/2016**





Administer Medication


Penicillin G Sodium


3 million units every 4 hours (total dose of 12 million unitsover 24 hours)


Start Treatment:  Mar 5, 2018


Stop Treatment:  Apr 10, 2018





Additional Information


Venous access:  PICC Line


Additional Instructions





[x] Peripheral flush and dressing changes per protocol


[x] Implanted port and central :


* Implanted port: 10 ml Normal Saline followed by 5 ml Heparin 100 units/ml 

Heparin flush


   after each use and monthly to maintain.


   [] May leave port accessed during therapy.


   [] May leave peripheral site accessed for duration of therapy.





[x] If patient has SOB or respiratory distress, check oxygen saturation. If 

less than 90% or clinical signs of respiratory distress, administer oxygen at 2 

L/min. via nasal cannula and notify physician.





[x] Anaphylaxis/Reaction orders:


* Stop infusion.


* Keep IV line open with saline flush.


* Notify physician.


* Monitor vital signs every 15 minutes until symptoms resolve.


* Check Oxygen saturation; Oxygen at 2 L/min. via nasal cannula if less than 90%

 or clinical signs of respiratory distress.


* Administer diphenhydramine (Benadryl) 25 mg IV STAT, (unless patient has 

received as pre-med). May repeat once, if necessary.


* Solu-Cortef 250 mg IVP over 30-60 seconds, use 100 mg vials for each 

dissolution.


* Epinephrine (1mg/1 ml) 0.3 mg subcutaneously or IVP now with any signs of 

respiratory distress.


* Check with physician for new additional pre-med orders if patient is re-

challenged or re-treated.


[x] May remove PICC line when treatment complete, after confirming with 

Physician.


[x] If the patient is admitted to the hospital, the ED, or transferred via [facility]

, complete transfer form including medication reconciliation order sheet.





Laboratory Tests


Weekly Labs:  CBC w/diff, Creatinine, CRP, LFT's (Hepatic function test)


Additional Information


Please draw weekly labs, fax to the numbers below.


Call MD with abnormal labs, change in clinical condition or problems to:





 or covering ID Physician


Phone:[phone] 


Fax:[phone]





Follow up appt:


Follow up with PCP


Follow up with other MDs as planned.





Counseling:


Counseled about medication side effects


Counseled about PICC line care and hand hygiene.











Kiera Lucas MD Mar 5, 2018 14:39

## 2018-03-06 VITALS
SYSTOLIC BLOOD PRESSURE: 128 MMHG | OXYGEN SATURATION: 98 % | HEART RATE: 86 BPM | TEMPERATURE: 97.1 F | DIASTOLIC BLOOD PRESSURE: 57 MMHG | RESPIRATION RATE: 18 BRPM

## 2018-03-06 VITALS
DIASTOLIC BLOOD PRESSURE: 68 MMHG | TEMPERATURE: 98.1 F | OXYGEN SATURATION: 100 % | HEART RATE: 88 BPM | SYSTOLIC BLOOD PRESSURE: 109 MMHG | RESPIRATION RATE: 20 BRPM

## 2018-03-06 VITALS
SYSTOLIC BLOOD PRESSURE: 107 MMHG | HEART RATE: 96 BPM | DIASTOLIC BLOOD PRESSURE: 56 MMHG | TEMPERATURE: 98.2 F | OXYGEN SATURATION: 98 % | RESPIRATION RATE: 18 BRPM

## 2018-03-06 VITALS — RESPIRATION RATE: 16 BRPM

## 2018-03-06 RX ADMIN — PENICILLIN G SODIUM SCH MLS/HR: 5000000 INJECTION, POWDER, FOR SOLUTION INTRAMUSCULAR; INTRAVENOUS at 06:00

## 2018-03-06 RX ADMIN — Medication SCH ML: at 08:29

## 2018-03-06 RX ADMIN — STANDARDIZED SENNA CONCENTRATE AND DOCUSATE SODIUM SCH TAB: 8.6; 5 TABLET, FILM COATED ORAL at 08:28

## 2018-03-06 RX ADMIN — METHADONE HYDROCHLORIDE SCH MG: 10 TABLET ORAL at 08:28

## 2018-03-06 RX ADMIN — PENICILLIN G SODIUM SCH MLS/HR: 5000000 INJECTION, POWDER, FOR SOLUTION INTRAMUSCULAR; INTRAVENOUS at 02:21

## 2018-03-06 RX ADMIN — PANTOPRAZOLE SCH MG: 40 TABLET, DELAYED RELEASE ORAL at 08:28

## 2018-03-06 RX ADMIN — PENICILLIN G SODIUM SCH MLS/HR: 5000000 INJECTION, POWDER, FOR SOLUTION INTRAMUSCULAR; INTRAVENOUS at 09:36

## 2018-03-06 RX ADMIN — HEPARIN SODIUM SCH UNITS: 10000 INJECTION, SOLUTION INTRAVENOUS; SUBCUTANEOUS at 08:28

## 2018-03-06 NOTE — HHI.FPPN
Subjective


Remarks


Patient is looking forward to going home.  He denies fever, chills, chest pain, 

nausea, vomiting, shortness of breath.


 (Oniel Burleson MD, R3)





Objective


Vitals





Vital Signs








  Date Time  Temp Pulse Resp B/P (MAP) Pulse Ox O2 Delivery O2 Flow Rate FiO2


 


3/6/18 09:34   16     


 


3/6/18 08:20 98.1 88 20 109/68 (82) 100   


 


3/6/18 04:00 98.2 96 18 107/56 (73) 98   


 


3/6/18 00:00 97.1 86 18 128/57 (80) 98   


 


3/5/18 20:00 97.3 81 20 134/61 (85) 97   


 


3/5/18 16:58 97.6 97 18 105/68 (80) 100   


 


3/5/18 12:02 97.5 95 18 111/72 (85) 96   














I/O      


 


 3/5/18 3/5/18 3/5/18 3/6/18 3/6/18 3/6/18





 07:00 15:00 23:00 07:00 15:00 23:00


 


Intake Total 600 ml     


 


Output Total 300 ml     


 


Balance 300 ml     


 


      


 


Intake Oral 600 ml     


 


Output Urine Total 300 ml     


 


# Voids 3   4  








 (Oniel Burleson MD, R3)


Result Diagram:  


3/5/18 1155                                                                    

            3/5/18 1154





Objective Remarks


GENERAL: This is a thin though not cachectic male.  No acute distress. 


SKIN: Cool and dry.


HEAD: Atraumatic. Normocephalic. 


EYES: Pupils equal round and reactive.


ENT: Mostly edentulous.  Nose without bleeding, purulent drainage or septal 

hematoma.


CARDIOVASCULAR: 3/6 soft systolic ejection murmur best heard at the cardiac 

apex.  Regular rate and rhythm


RESPIRATORY: Clear to auscultation. Breath sounds equal bilaterally. 


GASTROINTESTINAL: Abdomen soft, TTP in RUQ, nondistended.


MUSCULOSKELETAL:   No joint tenderness.    


Back: Swollen area on back almost resolved, but TTP soft tissue midline at L4/

L5. No erythema.  No paravertebral muscle tenderness.


NEUROLOGICAL: Awake and alert. Cranial nerves II through XII intact.  Motor and 

sensory grossly within normal limits. Five out of 5 muscle strength in all 

muscle groups.  Normal speech.


 (Oniel Burleson MD, R3)





A/P


Assessment and Plan


37-year-old male with history of IV drug use, endocarditis, vertebral 

osteomyelitis; he presented with acute on chronic low back pain, subjective 

fevers.  Infectious disease consult.  Echo shows endocarditis.  Positive blood 

cultures to date all strep viridans.  Treatment as below. Last negative blood 

cultures were drawn 2/27.  Discharge and home as below.


Discharge Planning


Discharging today with home health services.


 (Oniel Burleson MD, R3)


Attending Attestation


Patient seen and examined.  Case reviewed and discussed with the resident team.

  Agree with plan of care as discussed with me and documented in the resident 

note.


he has been warned multiple times by multiple people that he can die if he 

misuses his PICC line or doesn't take care of himself. He knows he is at high 

risk of endocarditis forever


 (Sonia Turcios MD)


Problem List:  


(1) Endocarditis


ICD Codes:  I38 - Endocarditis, valve unspecified


Status:  Acute


Plan:  Echo showed a moderate to large sized mobile echodensity (0.71.4 cm) 

located on the anterior mitral valve leaflet.


Infectious disease consulted


Sensitivities in the EMR


Repeat blood cultures 2/27 at 0600 NGTD


Continue penicillin  per ID instructions with PICC line





Antibiotic history:


Vancomycin 2/23-2/24





Patient understands with continued IV drug use and infection, he could have 

serious morbidity and even death





(2) Methadone maintenance therapy patient


ICD Codes:  F11.20 - Opioid dependence, uncomplicated


Status:  Chronic


Plan:  Continue methadone 30 mg daily.





(3) Anemia


ICD Codes:  D64.9 - Anemia, unspecified


Status:  Chronic


Plan:  Hemoccult stool negative


Transfuse for hemoglobin less than 7.


On his last admission he was pancytopenic


This is likely from endocarditis plus his Hepatitis


ordered anemia workup and CBC today. 


consider Heme consult is needed





(4) Thrombocytopenia


ICD Codes:  D69.6 - Thrombocytopenia, unspecified


Status:  Chronic


Plan:  Appears to be chronic, is stable and improving now


He was pancytopenic at his last admission.


Likely from endocarditis plus Hep C


Careful with heparin


Consider hematology consult


Follow CBC





(5) Back pain


ICD Codes:  M54.9 - Dorsalgia, unspecified


Status:  Chronic


Plan:  Much improved





HISTORY: 


Initial Concern for recurrent vertebral osteomyelitis.


Consulted infectious disease


Consulted neurosurgery Dr. Nichols (known to patient from 6/2016)


Case discussed with infectious disease and neurosurgery.


antibiotics were not started initially. however, 4/4 blood cultures are 

positive for gram positive cocci (see above)


Possible biopsy however any back infection would likely be septic from the 

heart or have led to the endocarditis and may very well be the same organism.  


As patient is bacteremic, antibiotics per ID. starting with vancomycin


Continue methadone.  Morphine 2 mg IV every 3 hours when necessary pain 6-10.





(6) FEN/DVT PPX/GI PPX/Nursing Orders 


Status:  Acute


Plan:  Fluids: Tolerating by mouth


Electrolytes: Monitor and replace as needed


Nutrition: Regular diet


Prophylaxis: SCDs and heparin at q 12 because of slightly low platelets


 (Oniel Burleson MD, R3)





Problem Qualifiers





(1) Endocarditis:  


Qualified Codes:  I33.0 - Acute and subacute infective endocarditis


(2) Anemia:  


Qualified Codes:  D64.9 - Anemia, unspecified


(3) Back pain:  


Qualified Codes:  M54.5 - Low back pain








Oniel Burleson MD, R3 Mar 6, 2018 09:41


Sonia Turcios MD Mar 8, 2018 16:52
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
